# Patient Record
Sex: MALE | Race: WHITE
[De-identification: names, ages, dates, MRNs, and addresses within clinical notes are randomized per-mention and may not be internally consistent; named-entity substitution may affect disease eponyms.]

---

## 2017-12-08 ENCOUNTER — HOSPITAL ENCOUNTER (EMERGENCY)
Dept: HOSPITAL 25 - UCEAST | Age: 82
Discharge: HOME | End: 2017-12-08
Payer: MEDICARE

## 2017-12-08 VITALS — SYSTOLIC BLOOD PRESSURE: 131 MMHG | DIASTOLIC BLOOD PRESSURE: 75 MMHG

## 2017-12-08 DIAGNOSIS — I10: ICD-10-CM

## 2017-12-08 DIAGNOSIS — Z79.82: ICD-10-CM

## 2017-12-08 DIAGNOSIS — R33.9: Primary | ICD-10-CM

## 2017-12-08 PROCEDURE — 51702 INSERT TEMP BLADDER CATH: CPT

## 2017-12-08 PROCEDURE — G0463 HOSPITAL OUTPT CLINIC VISIT: HCPCS

## 2017-12-08 PROCEDURE — 81003 URINALYSIS AUTO W/O SCOPE: CPT

## 2017-12-08 PROCEDURE — 87086 URINE CULTURE/COLONY COUNT: CPT

## 2017-12-08 PROCEDURE — 99201: CPT

## 2017-12-08 NOTE — UC
Complaint Male HPI





- HPI Summary


HPI Summary: 


problems with urinary retention for about 2 weeks. Today has pain in lower 

abdomen and cannot void








- History of Current Complaint


Chief Complaint: UCGU


Stated Complaint: FREQUENT URINATION


Time Seen by Provider: 12/08/17 16:54


Hx Obtained From: Patient


Onset/Duration: Gradual Onset, Lasting Weeks - 2, Still Present


Timing: Constant


Severity Initially: Mild


Severity Currently: Severe


Pain Intensity: 8


Pain Scale Used: 0-10 Numeric


Location: Suprapubic


Character: Constant Pressure


Aggravating Factor(s): Palpation


Alleviating Factor(s): Nothing


Associated Signs And Symptoms: Negative: Back Pain, Fever, Hematuria, Dysuria, 

Rectal Pain, Nausea, Vomiting(# Of Episodes =), Penile Swelling





- Allergies/Home Medications


Allergies/Adverse Reactions: 


 Allergies











Allergy/AdvReac Type Severity Reaction Status Date / Time


 


Unable to Obtain Allergy   Verified 12/08/17 15:51











Home Medications: 


 Home Medications





Aspirin TAB* [Aspirin 325 MG TAB*] 325 mg PO DAILY 12/08/17 [History Confirmed 

12/08/17]


glyBURIDE TAB* [Diabeta TAB*] 5 mg PO 0800,1700 12/08/17 [History Confirmed 12/ 08/17]











PMH/Surg Hx/FS Hx/Imm Hx


Previously Healthy: No


Cardiovascular History: Hypertension





- Surgical History


Surgical History: None





- Family History


Known Family History: Positive: None





- Social History


Occupation: Retired


Lives: Alone


Alcohol Use: None


Substance Use Type: None


Smoking Status (MU): Never Smoked Tobacco





- Immunization History


Most Recent Influenza Vaccination: no


Most Recent Pneumonia Vaccination: no, unsure





Review of Systems


Constitutional: Negative


Skin: Negative


Eyes: Negative


ENT: Negative


Respiratory: Negative


Cardiovascular: Negative


Gastrointestinal: Negative


Genitourinary: Negative - to void, Other


Motor: Negative


Neurovascular: Negative


Musculoskeletal: Negative


Neurological: Negative


Psychological: Negative


Is Patient Immunocompromised?: No


All Other Systems Reviewed And Are Negative: Yes





Physical Exam


Triage Information Reviewed: Yes


Appearance: Well-Appearing, No Pain Distress, Well-Nourished


Vital Signs: 


 Initial Vital Signs











Temp  97.6 F   12/08/17 15:51


 


Pulse  81   12/08/17 15:51


 


Resp  20   12/08/17 15:51


 


BP  142/77   12/08/17 15:51


 


Pulse Ox  98   12/08/17 15:51











Vital Signs Reviewed: Yes


Eye Exam: Normal


Eyes: Positive: Conjunctiva Clear


ENT Exam: Normal


ENT: Positive: Normal ENT inspection, Hearing grossly normal, Pharynx normal.  

Negative: Nasal congestion, Trismus, Muffled voice, Hoarse voice, Dental 

tenderness


Dental Exam: Normal


Neck exam: Normal


Neck: Negative: Supple, Nontender


Respiratory Exam: Normal


Respiratory: Positive: Chest non-tender, Lungs clear, Normal breath sounds, No 

respiratory distress, No accessory muscle use


Cardiovascular Exam: Normal


Cardiovascular: Positive: RRR, No Murmur, Pulses Normal, Brisk Capillary Refill


Abdominal Exam: Normal


Abdomen Description: Positive: Distended, Other: - Bladder palpated to 1 fb 

below umbilicus.  Negative: CVA Tenderness (R), CVA Tenderness (L)


Bowel Sounds: Positive: Present


Musculoskeletal Exam: Normal


Musculoskeletal: Positive: Strength Intact, ROM Intact, No Edema


Neurological Exam: Normal


Neurological: Positive: Alert, Muscle Tone Normal


Psychological Exam: Normal


Skin Exam: Normal





Re-Evaluation





- Re-Evaluation


  ** First Eval


Change: Improved - Sandoval cath placed by nursing 1400cc of urine returned clear 

yellow-patient feels better





 Complaint Male Course/Dx





- Course


Course Of Treatment: Leave sandoval in , follow with urology on Monday return for 

any problems or concerns





- Differential Dx/Diagnosis


Provider Diagnoses: Urinary retention





Discharge





- Discharge Plan


Condition: Stable


Disposition: HOME


Patient Education Materials:  Urinary Retention in Men (ED), Sandoval Catheter 

Placement and Care (ED)


Referrals: 


[facility] [Provider Group] - 12/11/17

## 2017-12-14 ENCOUNTER — HOSPITAL ENCOUNTER (EMERGENCY)
Dept: HOSPITAL 25 - UCEAST | Age: 82
Discharge: HOME | End: 2017-12-14
Payer: MEDICARE

## 2017-12-14 VITALS — DIASTOLIC BLOOD PRESSURE: 67 MMHG | SYSTOLIC BLOOD PRESSURE: 149 MMHG

## 2017-12-14 DIAGNOSIS — K59.00: Primary | ICD-10-CM

## 2017-12-14 PROCEDURE — G0463 HOSPITAL OUTPT CLINIC VISIT: HCPCS

## 2017-12-14 PROCEDURE — 99213 OFFICE O/P EST LOW 20 MIN: CPT

## 2017-12-14 RX ADMIN — SODIUM PHOSPHATE ONE BOTTLE: 7; 19 ENEMA RECTAL at 18:48

## 2017-12-14 NOTE — UC
Rectal Pain HPI





- HPI Summary


HPI Summary: 





84 year old male with history of DM, s/p sandoval placement for urinary retention 

here for difficulty with BM for 3 days. Last nml BM 3 days ago, and small bm 

this morning. He reports he has the urgency but no success. No abdominal pain, 

nausea or vomiting. 


No recent change in medications or use of opiates. 





- History Of Current Complaint


Chief Complaint: UCGeneralIllness


Stated Complaint: RECTAL DISCOMFORT


Time Seen by Provider: 12/14/17 17:53


Onset/Duration: Gradual Onset


Timing: Constant


Severity Initially: Mild


Severity Currently: Moderate


Location Of Pain: Rectal


Associated Signs And Symptoms: Positive: Constipation.  Negative: Rectal 

Bleeding, Blood-Streaked Stool, Black Tarry Stool, External Hemorrhoid, Diarrhea





- Allergies/Home Medications


Allergies/Adverse Reactions: 


 Allergies











Allergy/AdvReac Type Severity Reaction Status Date / Time


 


antibiotic Allergy  Rash Uncoded 12/14/17 17:47














PMH/Surg Hx/FS Hx/Imm Hx





- Surgical History


Surgical History: None





- Family History


Known Family History: Positive: None





- Social History


Alcohol Use: None


Substance Use Type: None


Smoking Status (MU): Never Smoked Tobacco





- Immunization History


Most Recent Influenza Vaccination: no


Most Recent Pneumonia Vaccination: no, unsure





Review of Systems


Constitutional: Negative


Skin: Negative


Eyes: Negative


ENT: Negative


Respiratory: Negative


Cardiovascular: Negative


Gastrointestinal: Negative


Genitourinary: Negative


Motor: Negative


Neurovascular: Negative


Musculoskeletal: Negative


Neurological: Negative


Psychological: Negative


All Other Systems Reviewed And Are Negative: Yes





Physical Exam


Triage Information Reviewed: Yes


Appearance: Well-Appearing, No Pain Distress


Vital Signs: 


 Initial Vital Signs











Temp  36.6 C   12/14/17 17:47


 


Pulse  85   12/14/17 17:47


 


Resp  16   12/14/17 17:47


 


BP  149/67   12/14/17 17:47


 


Pulse Ox  100   12/14/17 17:47











Eye Exam: Normal


Dental Exam: Normal


Neck exam: Normal


Respiratory: Positive: Chest non-tender, Lungs clear, Normal breath sounds, No 

respiratory distress


Cardiovascular: Positive: RRR, No Murmur


Abdomen Description: Positive: Nontender, No Organomegaly, Soft


Bowel Sounds: Positive: Present, Other: - No external hemorrhoids No hard 

stools No rectal fluctuance


Skin Exam: Normal





Rectal Pain Course/Dx





- Course


Course Of Treatment: Constipation.  patient received fleet enema and had a 

large bowel movement.





- Differential Dx/Diagnosis


Differential Diagnosis/HQI/PQRI: Hemorrhoid(s), Perirectal Abscess, Pruritis Ani


Provider Diagnoses: Constipation





Discharge





- Discharge Plan


Condition: Good


Disposition: HOME


Prescriptions: 


Docusate CAP* [Colace Cap*] 100 mg PO BID PRN #20 cap


 PRN Reason: Constipation


Patient Education Materials:  Constipation (ED), High Fiber Diet (ED), Fleet 

Enema (ED)


Referrals: 


No Primary Care Phys,NOPCP [Primary Care Provider] -

## 2017-12-18 ENCOUNTER — HOSPITAL ENCOUNTER (EMERGENCY)
Dept: HOSPITAL 25 - UCEAST | Age: 82
Discharge: HOME | End: 2017-12-18
Payer: MEDICARE

## 2017-12-18 VITALS — SYSTOLIC BLOOD PRESSURE: 145 MMHG | DIASTOLIC BLOOD PRESSURE: 72 MMHG

## 2017-12-18 DIAGNOSIS — R31.9: Primary | ICD-10-CM

## 2017-12-18 DIAGNOSIS — R03.0: ICD-10-CM

## 2017-12-18 PROCEDURE — 87086 URINE CULTURE/COLONY COUNT: CPT

## 2017-12-18 PROCEDURE — 99212 OFFICE O/P EST SF 10 MIN: CPT

## 2017-12-18 PROCEDURE — G0463 HOSPITAL OUTPT CLINIC VISIT: HCPCS

## 2017-12-18 PROCEDURE — 81003 URINALYSIS AUTO W/O SCOPE: CPT

## 2017-12-18 NOTE — UC
Maria Antonia DUCKWORTH Nilda, scribed for Amauri Moraes MD on 12/18/17 at 1745 .





 Complaint Male HPI





- HPI Summary


HPI Summary: 


This patient is an 84 year old M [place] [facility] [place] chief complaint of 

blood in catheter bag since yesterday. Pt states urinary catheter was placed 12/ 8[place]17 [place] [facility] [place].  Symptoms aggravated by sitting and 

alleviated by standing. Patient reports constipation and that he "feels strange 

down there." Per triage note, the patient rates the pressure 3/10 in severity. 

He states his clothes seem tight and is unsure if this is affecting his 

catheter. Patient denies malaise, pain, fever, chills, and sore throat. Pt 

states he has an appointment with urologist tomorrow. 





- History of Current Complaint


Chief Complaint: UCGU


Stated Complaint: BLOOD IN URINE


Time Seen by Provider: 12/18/17 17:30


Hx Obtained From: Patient, Medical Records - triage note


Onset/Duration: Sudden Onset, Lasting Days - yesterday, Still Present


Timing: Lasting Days


Severity Currently: Mild


Pain Intensity: 3


Pain Scale Used: 0-10 Numeric


Location: Penis


Character: Constant Pressure


Aggravating Factor(s): Other - sitting


Alleviating Factor(s): Other - standing


Associated Signs And Symptoms: Positive: Hematuria, Constipation.  Negative: 

Fever





- Allergies/Home Medications


Allergies/Adverse Reactions: 


 Allergies











Allergy/AdvReac Type Severity Reaction Status Date / Time


 


antibiotic Allergy  Rash Uncoded 12/14/17 17:47














PMH/Surg Hx/FS Hx/Imm Hx


Previously Healthy: No


Endocrine History: Diabetes


Cardiovascular History: Hypertension





- Surgical History


Surgical History: None





- Family History


Known Family History: Positive: Diabetes





- Social History


Occupation: Retired


Lives: Alone


Alcohol Use: None


Substance Use Type: None


Smoking Status (MU): Never Smoked Tobacco





- Immunization History


Most Recent Influenza Vaccination: no


Most Recent Pneumonia Vaccination: no, unsure





Review of Systems


Constitutional: Other - negative fever, chills, malaise


ENT: Other - negative sore throat


Gastrointestinal: Other - constipation


Genitourinary: Hematuria, Other - pressure; negative pain.


All Other Systems Reviewed And Are Negative: Yes





Physical Exam


Triage Information Reviewed: Yes


Vital Signs: 


 Initial Vital Signs











Temp  97.8 F   12/18/17 16:52


 


Pulse  77   12/18/17 16:52


 


Resp  18   12/18/17 16:52


 


BP  140/76   12/18/17 16:52


 


Pulse Ox  98   12/18/17 16:52











Vital Signs Reviewed: Yes





- Additional Comments


General: well-appearing, no pain distress


Skin: warm, color reflects adequate perfusion, dry


Head: normal   


Eyes: EOMI, BRE


ENT: normal


Neck: supple, nontender


Respiratory: CTA, breath sounds present


Cardiovascular: RRR


Abdomen: soft, nontender


Bowel: present


Urine: Red tinged urine in bag


Musculoskeletal: normal, strength/ROM intact


Neurological: normal, sensory/motor intact, A&O x3


Psychological: affect/mood appropriate





Re-Evaluation





- Re-Evaluation


  ** First Eval


Re-Evaluation Time: 18:42


Comment: Pt produced good urine.





 Complaint Male Course/Dx





- Course


Course Of Treatment: Allergies noted.  Medications reviewed.  BP noted and 

advised to follow up with PCP.  WILL GIVE BACTRIM DS, 1 HERE IN THE CLINIC AND 

ONE TO GO HOME FOR THE AM. HE WILL SEE DR HACKETT TOMORROW. DR HACKETT CAN 

DETERMINE IF MR DEGROOT NEEDEDS TO CONTINUE THE ANTIBIOTIC.  GO TO ED IF WORSE 

TONIGHT.





- Differential Dx/Diagnosis


Provider Diagnoses: HEMATURIA.  Elevated blood pressure without history of 

hypertension.





Discharge





- Discharge Plan


Condition: Stable


Disposition: HOME


Prescriptions: 


Sulfamethox/Trimethoprim DS* [Bactrim /160 TAB*] 1 tab PO BID #18 tab


Patient Education Materials:  Hematuria (ED)


Referrals: 


[facility] [Provider Group]


No Primary Care Phys,NOPCP [Primary Care Provider] - 


Raúl Hackett MD [Medical Doctor] - 


Additional Instructions: 


FOLLOW UP WITH UROLOGY, DR HACKETT, TOMORROW, 12/19/17, AS SCHEDULED.


TAKE THE ANTIBIOTIC BACTRIM IN THE MORNING. DISCUSS WITH DR HACKETT WHETHER TO 

CONTINUE THE ANTIBIOTIC.


GO TO THE EMERGENCY DEPARTMENT FOR ANY WORSENING OF YOUR CONDITION OR QUESTIONS 

OR CONCERNS.


Your blood pressure was elevated during todays visit; please follow up with 

your primary care provider within a week for further evaluation.





The documentation as recorded by the Maria Antonia emanuel Nilda accurately 

reflects the service I personally performed and the decisions made by me, 

Amauri Moraes MD.

## 2017-12-23 ENCOUNTER — HOSPITAL ENCOUNTER (EMERGENCY)
Dept: HOSPITAL 25 - UCEAST | Age: 82
Discharge: HOME | End: 2017-12-23
Payer: MEDICARE

## 2017-12-23 VITALS — SYSTOLIC BLOOD PRESSURE: 146 MMHG | DIASTOLIC BLOOD PRESSURE: 80 MMHG

## 2017-12-23 DIAGNOSIS — E11.9: ICD-10-CM

## 2017-12-23 DIAGNOSIS — Z46.6: Primary | ICD-10-CM

## 2017-12-23 DIAGNOSIS — Z88.1: ICD-10-CM

## 2017-12-23 PROCEDURE — G0463 HOSPITAL OUTPT CLINIC VISIT: HCPCS

## 2017-12-23 PROCEDURE — 99212 OFFICE O/P EST SF 10 MIN: CPT

## 2017-12-23 NOTE — UC
Complaint Male HPI





- HPI Summary


HPI Summary: 





PT HAS INDWELLING TEE FOR PAST WEEK OR SO FOLLOWED BY DR. BACON. PT 

UNCLEAR AS TO WHY HE HAS THIS BUT IS ON BACTRIM BID AND HAS FOLLOW-UP WITH DR. BACON ON DECEMBER 28. PT HERE REPORTING THAT THE LEG BAG IS LEAKING. HE 

THINKS IT GOT CAUGHT ON SOMETHING. IS REQUESTING A NEW BAG. PT DENIES FEVER, 

ABDOMINAL PAIN. URINE IS CLEAR AND NOT FOUL SMELLING.





- History of Current Complaint


Chief Complaint: UCGU


Stated Complaint: URINARY


Time Seen by Provider: 12/23/17 11:25


Hx Obtained From: Patient


Timing: Constant


Pain Intensity: 0


Pain Scale Used: 0-10 Numeric


Associated Signs And Symptoms: Positive: Negative





- Allergies/Home Medications


Allergies/Adverse Reactions: 


 Allergies











Allergy/AdvReac Type Severity Reaction Status Date / Time


 


antibiotic Allergy  Rash Uncoded 12/23/17 09:40














PMH/Surg Hx/FS Hx/Imm Hx


Endocrine History: Diabetes





- Surgical History


Surgical History: None





- Family History


Known Family History: Positive: Diabetes


   Negative: Hypertension





- Social History


Alcohol Use: None


Substance Use Type: None


Smoking Status (MU): Never Smoked Tobacco





- Immunization History


Most Recent Influenza Vaccination: no


Most Recent Pneumonia Vaccination: no, unsure





Review of Systems


Constitutional: Negative


Respiratory: Negative


Cardiovascular: Negative


Gastrointestinal: Negative


Genitourinary: Other - TEE BAG LEAKING


All Other Systems Reviewed And Are Negative: Yes





Physical Exam


Triage Information Reviewed: Yes


Appearance: Well-Appearing, No Pain Distress, Well-Nourished


Vital Signs: 


 Initial Vital Signs











Temp  97.7 F   12/23/17 09:36


 


Pulse  89   12/23/17 09:36


 


Resp  16   12/23/17 09:36


 


BP  149/74   12/23/17 09:36


 


Pulse Ox  99   12/23/17 09:36











Vital Signs Reviewed: Yes


Eyes: Positive: Conjunctiva Clear


ENT: Positive: Hearing grossly normal


Neck: Positive: Supple


Respiratory: Positive: No respiratory distress, No accessory muscle use


Cardiovascular: Positive: Pulses Normal


Abdomen Description: Positive: Nontender, Soft.  Negative: CVA Tenderness (R), 

CVA Tenderness (L)


Musculoskeletal: Positive: No Edema


Neurological: Positive: Alert


Psychological: Positive: Age Appropriate Behavior


Skin: Negative: rashes





 Complaint Male Course/Dx





- Course


Course Of Treatment: TEE BAG REPLACED WITHOUT INCIDENT





- Differential Dx/Diagnosis


Provider Diagnoses: TEE BAG REPLACEMENT





Discharge





- Discharge Plan


Condition: Stable


Disposition: HOME


Referrals: 


Lavon Ibrahim MD [Primary Care Provider] - If Needed


Raúl Bacon MD [Medical Doctor] -  (KEEP YOUR APPT ON DECEMBER 28)


Additional Instructions: 


YOUR TEE BAG WAS SLIGHTLY TORN. WE HAVE REPLACED IT WITH A NEW ONE. KEEP YOUR 

FOLLOW-UP APPOINTMENT WITH DR. BACON ON DECEMBER 28 AS SCHEDULED. SEEK FOLLOW

-UP HERE OR IN THE ER IF YOU ENCOUNTER ANY OTHER PROBLEMS IN THE MEANTIME.

## 2018-06-21 ENCOUNTER — HOSPITAL ENCOUNTER (INPATIENT)
Dept: HOSPITAL 25 - ED | Age: 83
LOS: 5 days | Discharge: NURSING, REHAB OR LONG-TERM CARE | DRG: 65 | End: 2018-06-26
Attending: HOSPITALIST | Admitting: INTERNAL MEDICINE
Payer: MEDICARE

## 2018-06-21 DIAGNOSIS — G81.94: ICD-10-CM

## 2018-06-21 DIAGNOSIS — Z79.84: ICD-10-CM

## 2018-06-21 DIAGNOSIS — I12.9: ICD-10-CM

## 2018-06-21 DIAGNOSIS — E44.1: ICD-10-CM

## 2018-06-21 DIAGNOSIS — I63.29: Primary | ICD-10-CM

## 2018-06-21 DIAGNOSIS — N39.0: ICD-10-CM

## 2018-06-21 DIAGNOSIS — Z87.891: ICD-10-CM

## 2018-06-21 DIAGNOSIS — N13.9: ICD-10-CM

## 2018-06-21 DIAGNOSIS — Z79.82: ICD-10-CM

## 2018-06-21 DIAGNOSIS — E11.22: ICD-10-CM

## 2018-06-21 DIAGNOSIS — C79.82: ICD-10-CM

## 2018-06-21 DIAGNOSIS — Z79.899: ICD-10-CM

## 2018-06-21 DIAGNOSIS — N18.3: ICD-10-CM

## 2018-06-21 DIAGNOSIS — B96.89: ICD-10-CM

## 2018-06-21 LAB
BASOPHILS # BLD AUTO: 0 10^3/UL (ref 0–0.2)
EOSINOPHIL # BLD AUTO: 0.3 10^3/UL (ref 0–0.6)
HCT VFR BLD AUTO: 38 % (ref 42–52)
HGB BLD-MCNC: 12.9 G/DL (ref 14–18)
INR PPP/BLD: 1.06 (ref 0.77–1.02)
LYMPHOCYTES # BLD AUTO: 0.9 10^3/UL (ref 1–4.8)
MCH RBC QN AUTO: 30 PG (ref 27–31)
MCHC RBC AUTO-ENTMCNC: 34 G/DL (ref 31–36)
MCV RBC AUTO: 87 FL (ref 80–94)
MONOCYTES # BLD AUTO: 0.9 10^3/UL (ref 0–0.8)
NEUTROPHILS # BLD AUTO: 5.1 10^3/UL (ref 1.5–7.7)
NRBC # BLD AUTO: 0 10^3/UL
NRBC BLD QL AUTO: 0
PLATELET # BLD AUTO: 242 10^3/UL (ref 150–450)
RBC # BLD AUTO: 4.33 10^6/UL (ref 4–5.4)
WBC # BLD AUTO: 7.2 10^3/UL (ref 3.5–10.8)

## 2018-06-21 PROCEDURE — 93880 EXTRACRANIAL BILAT STUDY: CPT

## 2018-06-21 PROCEDURE — 84484 ASSAY OF TROPONIN QUANT: CPT

## 2018-06-21 PROCEDURE — 99284 EMERGENCY DEPT VISIT MOD MDM: CPT

## 2018-06-21 PROCEDURE — 80061 LIPID PANEL: CPT

## 2018-06-21 PROCEDURE — 36415 COLL VENOUS BLD VENIPUNCTURE: CPT

## 2018-06-21 PROCEDURE — 87086 URINE CULTURE/COLONY COUNT: CPT

## 2018-06-21 PROCEDURE — 81015 MICROSCOPIC EXAM OF URINE: CPT

## 2018-06-21 PROCEDURE — 83036 HEMOGLOBIN GLYCOSYLATED A1C: CPT

## 2018-06-21 PROCEDURE — 70551 MRI BRAIN STEM W/O DYE: CPT

## 2018-06-21 PROCEDURE — 85610 PROTHROMBIN TIME: CPT

## 2018-06-21 PROCEDURE — 81003 URINALYSIS AUTO W/O SCOPE: CPT

## 2018-06-21 PROCEDURE — 80048 BASIC METABOLIC PNL TOTAL CA: CPT

## 2018-06-21 PROCEDURE — 82947 ASSAY GLUCOSE BLOOD QUANT: CPT

## 2018-06-21 PROCEDURE — 85025 COMPLETE CBC W/AUTO DIFF WBC: CPT

## 2018-06-21 PROCEDURE — 93005 ELECTROCARDIOGRAM TRACING: CPT

## 2018-06-21 PROCEDURE — 87077 CULTURE AEROBIC IDENTIFY: CPT

## 2018-06-21 PROCEDURE — 70450 CT HEAD/BRAIN W/O DYE: CPT

## 2018-06-21 PROCEDURE — 80053 COMPREHEN METABOLIC PANEL: CPT

## 2018-06-21 PROCEDURE — 83605 ASSAY OF LACTIC ACID: CPT

## 2018-06-21 PROCEDURE — 87186 SC STD MICRODIL/AGAR DIL: CPT

## 2018-06-22 RX ADMIN — SODIUM CHLORIDE SCH MLS/HR: 900 IRRIGANT IRRIGATION at 22:55

## 2018-06-22 RX ADMIN — HEPARIN SODIUM SCH UNITS: 5000 INJECTION INTRAVENOUS; SUBCUTANEOUS at 14:48

## 2018-06-22 RX ADMIN — INSULIN LISPRO SCH UNITS: 100 INJECTION, SOLUTION INTRAVENOUS; SUBCUTANEOUS at 21:20

## 2018-06-22 RX ADMIN — INSULIN LISPRO SCH UNITS: 100 INJECTION, SOLUTION INTRAVENOUS; SUBCUTANEOUS at 08:22

## 2018-06-22 RX ADMIN — CLOPIDOGREL SCH MG: 75 TABLET, FILM COATED ORAL at 07:47

## 2018-06-22 RX ADMIN — ASPIRIN 325 MG ORAL TABLET SCH MG: 325 PILL ORAL at 07:47

## 2018-06-22 RX ADMIN — INSULIN LISPRO SCH UNITS: 100 INJECTION, SOLUTION INTRAVENOUS; SUBCUTANEOUS at 12:10

## 2018-06-22 RX ADMIN — CLOPIDOGREL SCH: 75 TABLET, FILM COATED ORAL at 03:29

## 2018-06-22 RX ADMIN — HEPARIN SODIUM SCH UNITS: 5000 INJECTION INTRAVENOUS; SUBCUTANEOUS at 05:09

## 2018-06-22 RX ADMIN — INSULIN LISPRO SCH UNITS: 100 INJECTION, SOLUTION INTRAVENOUS; SUBCUTANEOUS at 17:22

## 2018-06-22 RX ADMIN — ATORVASTATIN CALCIUM SCH MG: 20 TABLET, FILM COATED ORAL at 21:20

## 2018-06-22 RX ADMIN — SODIUM CHLORIDE SCH MLS/HR: 900 IRRIGANT IRRIGATION at 05:05

## 2018-06-22 RX ADMIN — HEPARIN SODIUM SCH UNITS: 5000 INJECTION INTRAVENOUS; SUBCUTANEOUS at 21:20

## 2018-06-22 NOTE — RAD
HISTORY: weakness



COMPARISONS: None



TECHNIQUE: Multiple contiguous axial CT scans were obtained of the head without 

intravenous contrast. 



FINDINGS: 

HEMORRHAGE/INFARCT: There is no hemorrhage or acute infarct.

MASSES/SHIFT: There is no mass or shift.



EXTRA-AXIAL SPACES: There are no extra-axial fluid collections.

SULCI AND VENTRICLES: The sulci and ventricles are normal in size and position for the

patient's stated age.



CEREBRUM: There are no focal parenchymal abnormalities.

BRAINSTEM: There are no focal parenchymal abnormalities.

CEREBELLUM: There are no focal parenchymal abnormalities.



VESSELS: There is calcification of the cavernous segments of the internal carotid arteries

bilaterally and of the distal vertebral arteries bilaterally.

PARANASAL SINUSES: The paranasal sinuses are clear.

ORBITS: The orbits are unremarkable.

BONES AND SOFT TISSUE: No bone or soft tissue abnormalities are noted.



OTHER: None



IMPRESSION: 

NO ACUTE INTRACRANIAL PATHOLOGY.

## 2018-06-22 NOTE — RAD
HISTORY: r/o stenosis, weakness, no other history is provided



COMPARISONS: None



TECHNIQUE: Multiple transverse and longitudinal ultrasound images were obtained of the

carotid and vertebral arteries bilaterally, using grayscale, color Doppler, and spectral

Doppler imaging.



FINDINGS:



Measurement of carotid stenosis is based on flow velocity parameters that correlate the

residual internal carotid artery diameter with [employer] Symptomatic Carotid

Endarterectomy Trial (NASCET)-based stenosis levels.



RIGHT:

Intima: There is diffuse intimal thickening with more focal atheroma formation at the

bifurcation.



Velocities:

Right internal carotid artery maximum peak systolic velocity: 179 cm/s

Right common carotid artery maximum peak systolic velocity: 120 cm/s

Right internal carotid artery/common carotid artery ratio: 1.8



Waveforms: There is spectral broadening of the right ICA.



Right Vertebral: The right vertebral artery flow is antegrade.



LEFT:

Intima: There is diffuse intimal thickening with more focal atheroma formation at the

bifurcation.



Velocities:

Left internal carotid artery maximum peak systolic velocity: 88 cm/s

Left common carotid artery maximum peak systolic velocity: 114 cm/s

Left internal carotid artery/common carotid artery ratio: 1



Waveforms: There is no spectral broadening.



Left Vertebral: The left vertebral artery flow is antegrade.



OTHER FINDINGS: None.



IMPRESSION: 

1.  ELEVATED PEAK SYSTOLIC VELOCITIES OF THE RIGHT INTERNAL CAROTID ARTERY, CONSISTENT

WITH RIGHT INTERNAL CAROTID ARTERY STENOSIS BETWEEN 50% AND 69% BY NASCET CRITERIA..

2.  NO HEMODYNAMICALLY SIGNIFICANT STENOSIS OF THE LEFT INTERNAL CAROTID ARTERY BY FLOW

VELOCITY MEASUREMENTS. THIS CORRESPONDS TO A LUMINAL DIAMETER OF LESS THAN 50% STENOSIS BY

NASCET CRITERIA.



CPT II Codes: 3100F

## 2018-06-22 NOTE — RAD
HISTORY: r/o CVA, unsteady gait, falls



COMPARISONS: June 21, 2018



TECHNIQUE: The following sequences were obtained of the head: Sagittal T1-weighted images,

axial T2-weighted images, axial FLAIR images, axial susceptibility weighted images, axial

T1-weighted images. Additionally, axial diffusion-weighted images were obtained with

calculated apparent diffusion coefficients.



FINDINGS: 

HEMORRHAGE/INFARCT: There is restricted diffusion within the right parasagittal beth at

the pontomedullary junction consistent with subacute nonhemorrhagic infarct. Elsewhere,

there is no hemorrhage or acute infarct.

MASSES/SHIFT: There is no mass or shift.

EXTRA-AXIAL SPACES/MENINGES: There are no extra-axial fluid collections.

SULCI AND VENTRICLES: There is diffuse and proportional enlargement of the sulci and

ventricles.



CEREBRUM: There is diffuse multifocal elevated T2/FLAIR signal within the periventricular

and subcortical white matter. 

BRAINSTEM: There is elevated T2/STIR signal within the right parasagittal beth at the

pontomedullary junction corresponding to the area of restricted diffusion.

CEREBELLUM: There are no focal parenchymal abnormalities. The cerebellar tonsils are

normal in size and position.



SELLA: The sella is normal.

PINEAL: The pineal region is clear.

CP ANGLE/TEMPORAL BONES: The labyrinthine structures are grossly normal.



VESSELS: Normal flow-voids are noted within the visualized vertebral vasculature.

DIFFUSION ABNORMALITIES: As noted above, there is restricted diffusion within the right

parasagittal beth measuring 1.1 x 0.6 cm transversely. 



PARANASAL SINUSES/MASTOIDS: There is minimal mucosal thickening of ethmoid air cells with

a small left mastoid effusion.

ORBITS: The orbits are unremarkable.

BONES AND SOFT TISSUE: No bone or soft tissue abnormalities are noted.



OTHER: None



IMPRESSION: 

1.  SUBACUTE NONHEMORRHAGIC INFARCT OF THE RIGHT PARASAGITTAL BETH SUGGESTIVE OF A PONTINE

 STROKE.

2.  DIFFUSE INVOLUTIONAL CHANGE WITH CHRONIC SMALL VESSEL ISCHEMIC CHANGES.

3.  SMALL LEFT MASTOID EFFUSION. MILD SINUS MUCOSAL INFLAMMATORY DISEASE, WITHOUT

AIR-FLUID LEVEL TO SUGGEST ACUTE SINUSITIS.

## 2018-06-22 NOTE — HP
HISTORY AND PHYSICAL:

 

DATE OF ADMISSION:  06/22/18.

 

ADDENDUM:

1.  In regard to the treatment of the patient's likely subacute stroke, the 
patient is going to be added on Plavix to aspirin that he is already taking.

2.  Due to the chronic kidney insufficiency, CT angiogram of the head and neck 
is not going to be obtained, but I will obtain carotid Dopplers.

3.  The patient has a chronic indwelling Marshall catheter in place and his Marshall 
is going to be exchanged due to markedly abnormal urinalysis and likely Marshall- 
associated UTI.

 

 

 

[id number]/[facility] #: [id number]

MTDD

## 2018-06-22 NOTE — HP
*** ADDENDUM NOW INCLUDED ON THIS REPORT ***



CC:  Dr. Bacon; Dr. Ibrahim.*

 

HISTORY AND PHYSICAL:

 

DATE OF ADMISSION:  06/22/18.

 

PRIMARY CARE PROVIDER:  Dr. Ibrahim.

 

CHIEF COMPLAINT:  Unsteady gait, falls, and confusion.

 

HISTORY OF PRESENT ILLNESS:  Samuel Garcia is an 85-year-old male with history of 
metastatic prostate cancer with indwelling Marshall in place, who presented to the 
hospital with complaints of unsteady gait and falls for the past several weeks. 
The patient stated that he lost approximately 40 pounds in the past eight weeks
, although he is a very poor historian.  The son that brought the patient here 
stated that the patient had been disoriented occasionally and falling.  
Unfortunately, the son left and I was unable to contact him.  At this point, 
the patient appears to have left-sided weakness and is going to be admitted 
with a diagnosis of left-sided weakness likely subacute CVA.

 

PAST MEDICAL HISTORY:

1.  History of metastatic prostate cancer with indwelling Marshall catheter in 
place under the care of Dr. Bacon.

2.  History of diabetes.

3.  Hypertension.

4.  Chronic kidney disease, stage 3.

 

MEDICATIONS:

1.  Aspirin 325 mg daily.

2.  Glyburide 5 mg b.i.d.

3.  Colace 100 mg b.i.d. p.r.n.

4.  Lupron as per Dr. Bacon's orders.

 

ALLERGIES:  No known drug allergies.

 

FAMILY HISTORY:  The patient stated that he is not aware of medical history of 
his parents.

 

SOCIAL HISTORY:  The patient stated that he lives alone.  He is a retired 
. He quit smoking over 40 years ago.  He denies any alcohol or drug 
use.  He stated that his daughter decided to change her gender, and now her 
name is Lawson Tatum and his now son is Lawson is his surrogate.

 

The patient is a full code.

 

REVIEW OF SYSTEMS:  Please see history of present illness.  All other remaining 
12 systems were reviewed with the patient, who is a very poor historian and 
were otherwise negative.

 

                               PHYSICAL EXAMINATION

 

GENERAL:  The patient is a very pleasant 85-year-old male, who is in no acute 
distress.  The patient is alert and oriented x2.  He knows the year, but he is 
not aware of the month.  He is a very poor historian.

 

VITAL SIGNS:  Blood pressure of 134/79, heart rate of 77 and regular, 
respiratory rate 19, oxygen saturation 94% on room air, temperature of 97.6.

 

HEENT:  Head atraumatic and normocephalic.  Eyes:  Pupils are equal and 
reactive to light and accommodation.  Oropharynx clear.  Mucosa moist.

 

NECK:  Supple.  No JVD.  No bruits bilaterally.

 

RESPIRATORY:  Clear to auscultation bilaterally.

 

CARDIOVASCULAR:  Regular rate and rhythm.  No murmurs.

 

ABDOMEN:  Soft, nontender.  Bowel sounds present in all 4 quadrants.

 

EXTREMITIES:  There is no edema.  Pulses +2 bilaterally.  No clubbing or 
cyanosis.

 

NEUROLOGIC:  On neuro evaluation, speech clear.  Cranial nerves II through XII 
grossly intact.  Motor strength is 4+/5 in the left hand .  There is no 
pronator drift.  Right side is 5/5 in upper and lower extremity.  The left leg, 
the patient is able to raise off the bed for approximately 1 second by 
approximately 10 to 20 degrees before it falls back on the bed.

 

PSYCHIATRIC EVALUATION:  The patient appears to have a very flat affect.  He is 
a poor historian.

 

 DIAGNOSTIC STUDIES/LAB DATA:  White blood cell count of 7.2, hemoglobin of 12.9
, hematocrit of 38, and platelets of 242.  Sodium of 133, potassium 3.9, 
chloride 97, carbon dioxide 27, BUN 23, creatinine 1.4.  Liver function tests 
are unremarkable.  Glucose low at 274.  Lactic acid of 2.3.

 

Urinalysis grossly positive for UTI.

 

Brain CT was unremarkable and read by the [facility] radiologist.

 

The patient's EKG shows sinus rhythm with PACs, low voltage EKG, heart rate of 
79 beats per minute.

 

ASSESSMENT AND PLAN:

1.  Disorientation, falls, and left-sided weakness.  The patient likely has 
subacute stroke.  He is going to be admitted to the hospital on telemetry 
monitor bed.  Neuro checks are going to be continued and MRI of the brain is 
going to be obtained in the morning.  He will likely require a Neurology 
consult in the morning.

2.  In regards to the patient's likely urinary tract infection, Marshall catheter 
is going to be exchanged since the patient has indwelling Marshall catheter due to 
his history of prostate cancer.  The patient is going to be started on 
ceftriaxone.

3.  For diabetes, the patient is going to be continued on insulin sliding scale.

4.  Chronic kidney disease, stage 3 with creatinine at the baseline today.

5.  The patient's code status is full.  His surrogate is his son, Lawson.

6.  DVT prophylaxis:  The patient is going to be placed on heparin 
subcutaneously.

 

TIME SPENT:  Approximately, 65 minutes was spent on admission of this patient, 
more than half that time was spent face-to-face with the patient during the 
interview and physical exam.



ADDENDUM:

1.  In regard to the treatment of the patient's likely subacute stroke, the 
patient is going to be added on Plavix to aspirin that he is already taking.

2.  Due to the chronic kidney insufficiency, CT angiogram of the head and neck 
is not going to be obtained, but I will obtain carotid Dopplers.

3.  The patient has a chronic indwelling Marshall catheter in place and his Marshall 
is going to be exchanged due to markedly abnormal urinalysis and likely Marshall- 
associated UTI.

 

 

[id number]/CPS #: [id number]

[id number] #: [id number]

MTDD

## 2018-06-23 LAB
BASOPHILS # BLD AUTO: 0 10^3/UL (ref 0–0.2)
EOSINOPHIL # BLD AUTO: 0.3 10^3/UL (ref 0–0.6)
HCT VFR BLD AUTO: 36 % (ref 42–52)
HGB BLD-MCNC: 12.3 G/DL (ref 14–18)
LYMPHOCYTES # BLD AUTO: 1.1 10^3/UL (ref 1–4.8)
MCH RBC QN AUTO: 29 PG (ref 27–31)
MCHC RBC AUTO-ENTMCNC: 35 G/DL (ref 31–36)
MCV RBC AUTO: 85 FL (ref 80–94)
MONOCYTES # BLD AUTO: 0.7 10^3/UL (ref 0–0.8)
NEUTROPHILS # BLD AUTO: 5.8 10^3/UL (ref 1.5–7.7)
NRBC # BLD AUTO: 0 10^3/UL
NRBC BLD QL AUTO: 0.2
PLATELET # BLD AUTO: 248 10^3/UL (ref 150–450)
RBC # BLD AUTO: 4.21 10^6/UL (ref 4–5.4)
WBC # BLD AUTO: 7.9 10^3/UL (ref 3.5–10.8)

## 2018-06-23 RX ADMIN — HEPARIN SODIUM SCH UNITS: 5000 INJECTION INTRAVENOUS; SUBCUTANEOUS at 22:14

## 2018-06-23 RX ADMIN — HEPARIN SODIUM SCH UNITS: 5000 INJECTION INTRAVENOUS; SUBCUTANEOUS at 12:59

## 2018-06-23 RX ADMIN — ATORVASTATIN CALCIUM SCH MG: 20 TABLET, FILM COATED ORAL at 19:40

## 2018-06-23 RX ADMIN — CLOPIDOGREL SCH MG: 75 TABLET, FILM COATED ORAL at 09:18

## 2018-06-23 RX ADMIN — HEPARIN SODIUM SCH: 5000 INJECTION INTRAVENOUS; SUBCUTANEOUS at 05:03

## 2018-06-23 RX ADMIN — INSULIN LISPRO SCH UNITS: 100 INJECTION, SOLUTION INTRAVENOUS; SUBCUTANEOUS at 12:59

## 2018-06-23 RX ADMIN — INSULIN LISPRO SCH UNITS: 100 INJECTION, SOLUTION INTRAVENOUS; SUBCUTANEOUS at 22:14

## 2018-06-23 RX ADMIN — INSULIN LISPRO SCH: 100 INJECTION, SOLUTION INTRAVENOUS; SUBCUTANEOUS at 17:29

## 2018-06-23 RX ADMIN — ASPIRIN 325 MG ORAL TABLET SCH MG: 325 PILL ORAL at 09:18

## 2018-06-23 RX ADMIN — SODIUM CHLORIDE SCH MLS/HR: 900 IRRIGANT IRRIGATION at 13:52

## 2018-06-23 RX ADMIN — INSULIN LISPRO SCH UNITS: 100 INJECTION, SOLUTION INTRAVENOUS; SUBCUTANEOUS at 09:18

## 2018-06-23 NOTE — PN
Subjective


Date of Service: 06/22/18


Interval History: 


.


patient stable.


MRI shows pontine stroke.


denies pain


eating well -- awaiting PT








Family History: Unchanged from Admission


Social History: Unchanged from Admission


Past Medical History: Unchanged from Admission





Objective


Active Medications: 


.


Acetaminophen (Tylenol Tab*)  650 mg PO Q4H PRN


   PRN Reason: FEVER/PAIN


Al Hydrox/Mg Hydrox/Simethicone (Maalox Plus*)  30 ml PO Q6H PRN


   PRN Reason: INDIGESTION


Aspirin (Aspirin Tab*)  325 mg PO DAILY [facility]


   Last Admin: 06/23/18 09:18 Dose:  325 mg


Atorvastatin Calcium (Lipitor*)  20 mg PO 2100 [facility]


   Last Admin: 06/22/18 21:20 Dose:  20 mg


Clopidogrel Bisulfate (Plavix Tab*)  75 mg PO 0900 [facility]


   Last Admin: 06/23/18 09:18 Dose:  75 mg


Dextrose (D50w Syringe 50 Ml*)  12.5 gm IV PUSH .FOR FS < 60 - SS PRN


   PRN Reason: FS < 60


Docusate Sodium (Colace Cap*)  100 mg PO BID PRN


   PRN Reason: CONSTIPATION


Heparin Sodium (Porcine) (Heparin Vial(*))  5,000 units SUBCUT Q8HR [facility]


   Last Admin: 06/23/18 12:59 Dose:  5,000 units


Sodium Chloride (Ns 0.9% 1000 Ml*)  1,000 mls @ 75 mls/hr IV PER RATE [facility]


   Last Admin: 06/23/18 13:52 Dose:  75 mls/hr


Insulin Human Lispro (Humalog*)  0 units SUBCUT ACHS [facility]; Protocol


   Last Admin: 06/23/18 17:29 Dose:  Not Given


Morphine Sulfate (Morphine Vial*)  1 mg IV Q4H PRN


   PRN Reason: PAIN


.


 Vital Signs - 8 hr











  06/23/18 06/23/18





  11:36 15:36


 


Temperature 98.2 F 97.9 F


 


Pulse Rate 80 80


 


Respiratory 16 20





Rate  


 


Blood Pressure 136/71 122/79





(mmHg)  


 


O2 Sat by Pulse 97 96





Oximetry  











Oxygen Devices in Use Now: Nasal Cannula


Appearance: NAD


Eyes: No Scleral Icterus


Ears/Nose/Mouth/Throat: Clear Oropharnyx


Neck: NL Appearance and Movements; NL JVP


Respiratory: Symmetrical Chest Expansion and Respiratory Effort


Cardiovascular: NL Sounds; No Murmurs; No JVD


Abdominal: NL Sounds; No Tenderness; No Distention


Lymphatic: No Cervical Adenopathy


Extremities: No Edema


Skin: No Rash or Ulcers


Neurological: Alert and Oriented x 3, - - weak L hand  and poor L leg 

extension.


Lines/Tubes/Other Access: Clean, Dry and Intact Peripheral IV


Nutrition: Taking PO's


Result Diagrams: 


 06/23/18 06:48





 06/23/18 06:48


Microbiology and Other Data: 


 Microbiology











 06/22/18 00:39 Urine Culture - Preliminary





 Urine    Citrobacter Youngae














Assess/Plan/Problems-Billing








85 y.o. man with metastatic prostate cancer with unsteady gait, falls and L-

side weakness  now worsening and MRI showing pontine stroke 








- Patient Problems


(1) Right pontine stroke


Current Visit: Yes   Status: Acute   Priority: High   Code(s): I63.50 - CEREB 

INFRC DUE TO UNSP OCCLS OR STENOS OF UNSP CEREB ARTERY   Comment: 


- PT/OT ordered


- Continue ASA/Plavix


- Echo noted


- Statin, HTN & DM control


- PMRU referral made   





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


Current Visit: Yes   Status: Chronic   Priority: High   Code(s): C79.82 - 

SECONDARY MALIGNANT NEOPLASM OF GENITAL ORGANS   Comment: 


- known urinary retention; sandoval in place   





(3) CKD stage 3 due to type 2 diabetes mellitus


Current Visit: Yes   Status: Chronic   Priority: High   Code(s): E11.22 - TYPE 

2 DIABETES MELLITUS W DIABETIC CHRONIC KIDNEY DISEASE; N18.3 - CHRONIC KIDNEY 

DISEASE, STAGE 3 (MODERATE)   Comment: 


- following Cr.   





(4) Diabetes mellitus


Current Visit: Yes   Status: Chronic   Priority: Medium   Code(s): E11.9 - TYPE 

2 DIABETES MELLITUS WITHOUT COMPLICATIONS   Comment: 


- glucose control as part of CVA mgmt.   





(5) Obstructive uropathy


Current Visit: Yes   Status: Chronic   Priority: High   Code(s): N13.9 - 

OBSTRUCTIVE AND REFLUX UROPATHY, UNSPECIFIED   Comment: 


- indwelling sandoval catheter

## 2018-06-23 NOTE — PN
Subjective


Date of Service: 06/23/18


Interval History: 





denies pain


about the same neurologically


eating dinner as I speak with him





Family History: Unchanged from Admission


Social History: Unchanged from Admission


Past Medical History: Unchanged from Admission





Objective


Active Medications: 


.


Acetaminophen (Tylenol Tab*)  650 mg PO Q4H PRN


   PRN Reason: FEVER/PAIN


Al Hydrox/Mg Hydrox/Simethicone (Maalox Plus*)  30 ml PO Q6H PRN


   PRN Reason: INDIGESTION


Aspirin (Aspirin Tab*)  325 mg PO DAILY [facility]


   Last Admin: 06/23/18 09:18 Dose:  325 mg


Atorvastatin Calcium (Lipitor*)  20 mg PO 2100 [facility]


   Last Admin: 06/22/18 21:20 Dose:  20 mg


Clopidogrel Bisulfate (Plavix Tab*)  75 mg PO 0900 [facility]


   Last Admin: 06/23/18 09:18 Dose:  75 mg


Dextrose (D50w Syringe 50 Ml*)  12.5 gm IV PUSH .FOR FS < 60 - SS PRN


   PRN Reason: FS < 60


Docusate Sodium (Colace Cap*)  100 mg PO BID PRN


   PRN Reason: CONSTIPATION


Heparin Sodium (Porcine) (Heparin Vial(*))  5,000 units SUBCUT Q8HR [facility]


   Last Admin: 06/23/18 12:59 Dose:  5,000 units


Sodium Chloride (Ns 0.9% 1000 Ml*)  1,000 mls @ 75 mls/hr IV PER RATE [facility]


   Last Admin: 06/23/18 13:52 Dose:  75 mls/hr


Insulin Human Lispro (Humalog*)  0 units SUBCUT ACHS [facility]; Protocol


   Last Admin: 06/23/18 17:29 Dose:  Not Given


Morphine Sulfate (Morphine Vial*)  1 mg IV Q4H PRN


   PRN Reason: PAIN


.


 Vital Signs - 8 hr











  06/23/18 06/23/18





  11:36 15:36


 


Temperature 98.2 F 97.9 F


 


Pulse Rate 80 80


 


Respiratory 16 20





Rate  


 


Blood Pressure 136/71 122/79





(mmHg)  


 


O2 Sat by Pulse 97 96





Oximetry  











Oxygen Devices in Use Now: Nasal Cannula


Appearance: NAD


Eyes: No Scleral Icterus


Ears/Nose/Mouth/Throat: Clear Oropharnyx


Neck: NL Appearance and Movements; NL JVP, Trachea Midline


Respiratory: Symmetrical Chest Expansion and Respiratory Effort


Cardiovascular: NL Sounds; No Murmurs; No JVD


Abdominal: NL Sounds; No Tenderness; No Distention


Lymphatic: No Cervical Adenopathy


Extremities: No Edema


Skin: No Rash or Ulcers


Neurological: Alert and Oriented x 3, - - continued left hand and leg weakness 

and left facial droop


Lines/Tubes/Other Access: Clean, Dry and Intact Peripheral IV


Nutrition: Taking PO's


Result Diagrams: 


 06/23/18 06:48





 06/23/18 06:48


Microbiology and Other Data: 


 Microbiology











 06/22/18 00:39 Urine Culture - Preliminary





 Urine    Citrobacter Youngae














Assess/Plan/Problems-Billing








85 y.o. man with metastatic prostate cancer with unsteady gait, falls and L-

side weakness ; now worsening and MRI showing pontine stroke 








- Patient Problems


(1) Right pontine stroke


Current Visit: Yes   Status: Acute   Priority: High   Code(s): I63.50 - CEREB 

INFRC DUE TO UNSP OCCLS OR STENOS OF UNSP CEREB ARTERY   Comment: 


- PT/OT ordered -- both disciplines speak to the need to therapy and 

improvement.


- Continue ASA/Plavix


- Echo noted


- Statin, HTN & DM control ongoing.


- PMRU referral made -- admission likely 6/25/18.   





(2) CKD stage 3 due to type 2 diabetes mellitus


Current Visit: Yes   Status: Chronic   Priority: High   Code(s): E11.22 - TYPE 

2 DIABETES MELLITUS W DIABETIC CHRONIC KIDNEY DISEASE; N18.3 - CHRONIC KIDNEY 

DISEASE, STAGE 3 (MODERATE)   SNOMED Code(s): 747430114425


   Comment: 


- following Cr.   





(3) Diabetes mellitus


Current Visit: Yes   Status: Chronic   Priority: Medium   Code(s): E11.9 - TYPE 

2 DIABETES MELLITUS WITHOUT COMPLICATIONS   SNOMED Code(s): 37959429


   Comment: 


- glucose control as part of CVA mgmt.   





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


Current Visit: Yes   Status: Chronic   Priority: High   Code(s): C79.82 - 

SECONDARY MALIGNANT NEOPLASM OF GENITAL ORGANS   SNOMED Code(s): 12277387


   Comment: 


- known urinary retention; sandoval in place   





(5) Obstructive uropathy


Current Visit: Yes   Status: Chronic   Priority: High   Code(s): N13.9 - 

OBSTRUCTIVE AND REFLUX UROPATHY, UNSPECIFIED   SNOMED Code(s): 5893913


   Comment: 


- indwelling sandoval catheter

## 2018-06-23 NOTE — CONS
CC:  Boy Bowers MD; Dr. Lavon Ibrahim  *

 

CONSULTATION REPORT:

 

DATE OF CONSULT:  06/22/18

 

ATTENDING PHYSICIAN:  Boy Bowers MD

 

PRIMARY CARE PROVIDER:  Dr. Lavon Ibrahim.

 

REASON FOR CONSULTATION:  Left-sided weakness and confusion.

 

HISTORY OF PRESENT ILLNESS:  Mr. Garcia is an 85-year-old gentleman who is a 
poor historian, has a history of metastatic prostate cancer with indwelling 
Marshall catheter, has a history of chronic kidney disease stage 3, diabetes, and 
hypertension.  He may also have some underlying dementia as well.  He has had 
worsening difficulty waking over the last 2 to 3 weeks.  The patient is 
somewhat unclear on the timeline, but states that about 3 weeks ago he started 
to have difficulty walking and he was falling to the left, this happened rather 
acutely, but he did not seek help at that time.  The son noted that he appeared 
to be disoriented and was falling and he was brought to the hospital for 
further workup. I did review his history and physical.  There is scant 
information at this point, but the patient states that he understands that he 
had a stroke and understands that the left-sided weakness is related.  He is 
perseverating tonight on a group of people that he says were involved in a 
study.  He keeps asking me about this over and over.  It is unclear to me what 
his mental status is at baseline.  He did have an MRI on the following studies.
  A carotid ultrasound done.  Elevated peak systolic velocities in the right 
internal carotid artery consistent with a right internal carotid artery 
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  No hemodynamically significant stenosis of the 
left internal carotid artery by flow velocity measurements.  Brain MRI films 
reviewed, subacute nonhemorrhagic infarct to the right parasagittal helen just 
above the pontine  stroke, diffuse involutional change with chronic 
small vessel ischemic change, small left mastoid effusion, mild sinus mucosal 
inflammatory disease without air-fluid level to suggest acute sinusitis.  I 
agree with the findings.  Echocardiogram has not been done.  Brain CT done last 
night on admission showed no acute intracranial pathology, did show some 
diffuse atrophy.  The patient was brought in on aspirin and due to the likely 
subacute stroke, was started on Plavix, currently on dual- antiplatelet.  His 
Marshall catheter was changed and he was started on ceftriaxone. Since his 
admission to the hospital, he has had no reported worsening.  He has had a 
bedside swallowing screen, did not tolerate thin liquids, was given nectar- 
thickened liquids, and Speech Therapy has been consulted.

 

PAST MEDICAL HISTORY:  As noted above.

 

MEDICATIONS:  His medications at home include:

1.  Aspirin 325 mg daily.

2.  Glyburide 5 mg p.o. b.i.d.

3.  Colace 100 mg b.i.d. p.r.n.

4.  Lupron.

 

ALLERGIES:  No known drug allergies.

 

FAMILY HISTORY:  Unknown.

 

SOCIAL HISTORY:  He states that he smoked in the past.  He lives alone.  Retied 
.  He quit smoking approximately 40 years ago.  Denies any drug or 
alcohol use.  He has a son, previously daughter, who underwent a transgender 
operation.

 

REVIEW OF SYSTEMS:  As noted above.  The patient currently notes some back pain 
related to his hospital bed, but denies any otherwise stated symptoms.  He does 
specifically note the weakness on the left side of his body.  Denies any pain.  
No headaches.  No vision changes.  No neck pain.  He does note some numbness in 
the left arm and leg.

 

PHYSICAL EXAMINATION:  Vital Signs:  Afebrile, temp of 98.5, pulse rate of 73, 
respiratory rate of 16, pulse ox of 97% to 99%, blood pressure 117/68 to 130/77 
and 136/62.  In general, he is a well-developed, although slightly malnourished 
gentleman, lying in his hospital bed.  He is pale.  Skin is warm and dry.  HEENT
: He is normocephalic, atraumatic.  Sclerae are anicteric.  Mucous membranes 
are moist.  Oropharynx is clear.  Nares are patent.  Neck is supple.  No 
thyromegaly. No carotid bruits.  No meningismus.  Chest:  Clear to auscultation 
bilaterally. Cardiovascular is regular rate and rhythm without murmurs.  
Abdomen is nontender. He has an indwelling catheter in place.  He has no 
significant clubbing, cyanosis, or edema in his extremities.  On neurologic exam
, he is awake, alert.  He is oriented to person.  He thought it was 2015.  He 
is oriented to place.  His speech is fluent.  There is no dysarthria.  
Repetition is intact.  Recall is somewhat impaired.  Cranial Nerves:  Pupils 
were equally round and reactive to light. Extraocular muscles are intact.  
There is no diplopia.  No nystagmus.  His face has some mild left lower droop.  
Sensation is mildly diminished in the left face. Tongue is midline.  Palate 
raises symmetrically.  Shoulder shrug is intact.  He spontaneously moves all 
extremities antigravity with good effort and good resistance, but does have 
some drift in the left upper extremity and left lower extremity.  He has 4+/5 
in the left upper extremity proximally and distally. Proximally in the left 
lower extremity, he is 4/5, distally 4+/5.  He has some difficulty holding his 
left leg off the bed.  On the right side, he is 5/5 throughout.  Tone is 
slightly down on the left side, normal on the right side. DTRs were difficult 
to assess, appeared to be down throughout.  No focality.  His Babinski's were 
withdrawal bilaterally.  Sensation, he notes 10% decrease in his light touch 
and pinprick on the left face, arm, and leg.  Finger-to-nose and rapid 
alternating movements were slow.  He had some difficulty on the left side.  No 
tremors were noted.  No cogwheeling.  Gait was not tested at this time.  He 
feels very unstable on his feet.  His affect was noted to be flat.

 

DIAGNOSTIC STUDIES/LAB DATA:  Lab work includes a white count of 7.2, 
hemoglobin of 12.9, and hematocrit of 38.  INR of 1.06.  Chemistry:  Sodium of 
133, chloride of 97, creatinine of 1.41, glucose of 274, lactic acid of 2.3.  
Blood glucose 204 to 174 to 245.  Albumin of 3.0.  LDL of 103, HDL of 28, 
triglycerides 123, cholesterol of 156.  PSA of 0.153 on 05/15/18.

 

Studies as noted above.

 

ASSESSMENT AND PLAN:  Mr. Garcia is an 85-year-old gentleman with a known 
history of metastatic prostate cancer, history of diabetes, hypertension, 
chronic kidney disease, presents to the hospital with a 3 to 4-week history of 
left-sided weakness, some sensory changes on the left side, decreased light 
touch and pinprick, difficulty walking with some falls, and some confusion.  
Tonight on examination, he does appear to be slightly confused, although he is 
oriented to person and place.  He had some difficulty with time.  He is 
spontaneously moving all extremities, but does have drift on the left and has 
evidence of weakness and sensory loss in the left face, arm, and leg consistent 
with his stroke.

 

The plan is to continue his physical therapy, occupational therapy.  Speech 
Therapy has been consulted.  Currently, he is on a regular diet with nectar-
thickened liquids.  Would continue his aspirin and Plavix has been added and we 
can do this for 30 days and then I would continue his Plavix only and stop his 
aspirin. Carotid ultrasound shows some evidence of stenosis.  Will need long-
term followup, but no intervention at this time.  Echocardiogram is pending.  
Given the fact his symptoms started 3 to 4 weeks ago, there is not much we can 
do acutely.  I would continue to control his blood pressure and diabetes 
tightly.  At this point, it is mainly going to be physical therapy, 
occupational therapy, and speech therapy with disposition to be determined.  He 
will likely need some outpatient therapy, possibly may qualify for inpatient PM 
and R.  I will continue to follow him closely and make further recommendations 
as necessary.

 

Thank you for the opportunity to participate in the care of this very 
interesting patient.

 

 [id number] #: [id number]

ALISA

## 2018-06-23 NOTE — PN
Subjective


Date of Service: 06/23/18


Interval History: 





No major issues overnight.  Remains weak on the left side.  Difficulty 

ambulating.  Eating good breakfast this am.  





Objective


Active Medications: 








Acetaminophen (Tylenol Tab*)  650 mg PO Q4H PRN


   PRN Reason: FEVER/PAIN


Al Hydrox/Mg Hydrox/Simethicone (Maalox Plus*)  30 ml PO Q6H PRN


   PRN Reason: INDIGESTION


Aspirin (Aspirin Tab*)  325 mg PO DAILY [facility]


   Last Admin: 06/22/18 07:47 Dose:  325 mg


Atorvastatin Calcium (Lipitor*)  20 mg PO 2100 [facility]


   Last Admin: 06/22/18 21:20 Dose:  20 mg


Clopidogrel Bisulfate (Plavix Tab*)  75 mg PO 0900 [facility]


   Last Admin: 06/22/18 07:47 Dose:  75 mg


Dextrose (D50w Syringe 50 Ml*)  12.5 gm IV PUSH .FOR FS < 60 - SS PRN


   PRN Reason: FS < 60


Docusate Sodium (Colace Cap*)  100 mg PO BID PRN


   PRN Reason: CONSTIPATION


Heparin Sodium (Porcine) (Heparin Vial(*))  5,000 units SUBCUT Q8HR [facility]


   Last Admin: 06/23/18 05:03 Dose:  Not Given


Sodium Chloride (Ns 0.9% 1000 Ml*)  1,000 mls @ 75 mls/hr IV PER RATE [facility]


   Last Admin: 06/22/18 22:55 Dose:  75 mls/hr


Insulin Human Lispro (Humalog*)  0 units SUBCUT ACHS [facility]; Protocol


   Last Admin: 06/22/18 21:20 Dose:  3 units


Morphine Sulfate (Morphine Vial*)  1 mg IV Q4H PRN


   PRN Reason: PAIN








 Vital Signs











  06/22/18 06/22/18 06/22/18





  12:39 16:15 20:00


 


Temperature 97.7 F 98.5 F 


 


Pulse Rate 82 73 


 


Respiratory 18 16 18





Rate   


 


Blood Pressure 130/77 136/62 





(mmHg)   


 


O2 Sat by Pulse 99 97 





Oximetry   














  06/22/18 06/22/18 06/23/18





  20:08 23:28 03:34


 


Temperature 98.3 F 99.1 F 98.3 F


 


Pulse Rate 91 94 83


 


Respiratory 20 20 20





Rate   


 


Blood Pressure 160/74 142/77 122/51





(mmHg)   


 


O2 Sat by Pulse 97 95 95





Oximetry   














  06/23/18 06/23/18





  06:49 08:00


 


Temperature 98.5 F 


 


Pulse Rate 83 


 


Respiratory 20 20





Rate  


 


Blood Pressure 160/91 





(mmHg)  


 


O2 Sat by Pulse 96 





Oximetry  











Oxygen Devices in Use Now: Nasal Cannula


Neurology Exam: 





General: 





HEENT: Normocephalic/atraumatic, sclera anicteric, mucous membranes moist





Neck: Supple





Chest: Clear to auscultation bilaterally 





Cardiovascular: Regular rate and rhythm





Abdomen: Soft, nontender/nondistended





Extremities: No clubbing, cyanosis, or edema








Neurological Findings: 





Awake, Alert, Oriented to person and place





Speech: fluent without dysarthria





Cranial Nerve: PEERL, EOM intact, VFF, no nystagmus, left lower facial droop 

present,  hearing intact to finger rub bilaterally, palate elevates 

symmetrically, tongue midline





Motor: Moving all extremities, good strength 5/5 right upper and lower.  4+/5 

left side with drift in LUE.  Tone is normal





Sensation: 10-20% reduction in LT/PP in the left arm and leg





Finger to nose, rapid alternating movements intact without tremor








Result Diagrams: 


 06/23/18 06:48





 06/23/18 06:48





Assessment/Plan





86 y/o with history of metastatic prostate cancer, chronic indwelling cath with 

evidence of colonization vs UTI with unsteady gait, falls and left sided 

weakness for several weeks, subacute pontine stroke on MRI.





--At this point, care is largely supportive with PT/OT paramount.


--Continue ASA/Plavix


--Follow up Echo


--Statin, BP control, DM control


--Consider inpatient PMR evaluation





I will sign off for now.  Please schedule follow up appointment with me in 4 

weeks outpatient.  I remain available for any new issues or concerns

## 2018-06-24 RX ADMIN — INSULIN LISPRO SCH: 100 INJECTION, SOLUTION INTRAVENOUS; SUBCUTANEOUS at 21:49

## 2018-06-24 RX ADMIN — HEPARIN SODIUM SCH UNITS: 5000 INJECTION INTRAVENOUS; SUBCUTANEOUS at 06:20

## 2018-06-24 RX ADMIN — ATORVASTATIN CALCIUM SCH MG: 20 TABLET, FILM COATED ORAL at 21:44

## 2018-06-24 RX ADMIN — INSULIN LISPRO SCH UNITS: 100 INJECTION, SOLUTION INTRAVENOUS; SUBCUTANEOUS at 13:25

## 2018-06-24 RX ADMIN — INSULIN LISPRO SCH UNITS: 100 INJECTION, SOLUTION INTRAVENOUS; SUBCUTANEOUS at 09:29

## 2018-06-24 RX ADMIN — ASPIRIN 325 MG ORAL TABLET SCH MG: 325 PILL ORAL at 09:28

## 2018-06-24 RX ADMIN — CLOPIDOGREL SCH MG: 75 TABLET, FILM COATED ORAL at 09:29

## 2018-06-24 RX ADMIN — HEPARIN SODIUM SCH UNITS: 5000 INJECTION INTRAVENOUS; SUBCUTANEOUS at 13:26

## 2018-06-24 RX ADMIN — SODIUM CHLORIDE SCH MLS/HR: 900 IRRIGANT IRRIGATION at 04:09

## 2018-06-24 RX ADMIN — HEPARIN SODIUM SCH UNITS: 5000 INJECTION INTRAVENOUS; SUBCUTANEOUS at 21:43

## 2018-06-24 RX ADMIN — INSULIN LISPRO SCH UNITS: 100 INJECTION, SOLUTION INTRAVENOUS; SUBCUTANEOUS at 17:12

## 2018-06-24 NOTE — PN
Subjective


Date of Service: 06/24/18


Interval History: 


.


no new s/sx reported.


still with L sided weakness and work in the PT and OT disciplines.





denies HA.


eating well


anticipate PMRU admission 6/25/18.








Family History: Unchanged from Admission


Social History: Unchanged from Admission


Past Medical History: Unchanged from Admission





Objective


Active Medications: 


.


Acetaminophen (Tylenol Tab*)  650 mg PO Q4H PRN


   PRN Reason: FEVER/PAIN


   Last Admin: 06/23/18 19:40 Dose:  650 mg


Al Hydrox/Mg Hydrox/Simethicone (Maalox Plus*)  30 ml PO Q6H PRN


   PRN Reason: INDIGESTION


Aspirin (Aspirin Tab*)  325 mg PO DAILY [facility]


   Last Admin: 06/24/18 09:28 Dose:  325 mg


Atorvastatin Calcium (Lipitor*)  20 mg PO 2100 [facility]


   Last Admin: 06/23/18 19:40 Dose:  20 mg


Clopidogrel Bisulfate (Plavix Tab*)  75 mg PO 0900 [facility]


   Last Admin: 06/24/18 09:29 Dose:  75 mg


Dextrose (D50w Syringe 50 Ml*)  12.5 gm IV PUSH .FOR FS < 60 - SS PRN


   PRN Reason: FS < 60


Docusate Sodium (Colace Cap*)  100 mg PO BID PRN


   PRN Reason: CONSTIPATION


Heparin Sodium (Porcine) (Heparin Vial(*))  5,000 units SUBCUT Q8HR [facility]


   Last Admin: 06/24/18 13:26 Dose:  5,000 units


Sodium Chloride (Ns 0.9% 1000 Ml*)  1,000 mls @ 75 mls/hr IV PER RATE [facility]


   Last Admin: 06/24/18 04:09 Dose:  75 mls/hr


Insulin Human Lispro (Humalog*)  0 units SUBCUT ACHS [facility]; Protocol


   Last Admin: 06/24/18 17:12 Dose:  2 units


Morphine Sulfate (Morphine Vial*)  1 mg IV Q4H PRN


   PRN Reason: PAIN








 Vital Signs - 8 hr











  06/24/18 06/24/18





  11:49 15:42


 


Temperature 97.3 F 98.4 F


 


Pulse Rate 72 81


 


Respiratory 20 20





Rate  


 


Blood Pressure 139/55 124/54





(mmHg)  


 


O2 Sat by Pulse 93 93





Oximetry  











Oxygen Devices in Use Now: Nasal Cannula


Appearance: NAD


Eyes: No Scleral Icterus, PERRLA


Ears/Nose/Mouth/Throat: NL Teeth, Lips, Gums, Mucous Membranes Moist


Neck: NL Appearance and Movements; NL JVP, No Thyroid Enlargement, Masses


Respiratory: Symmetrical Chest Expansion and Respiratory Effort, Clear to 

Auscultation


Cardiovascular: NL Sounds; No Murmurs; No JVD


Abdominal: NL Sounds; No Tenderness; No Distention


Lymphatic: No Cervical Adenopathy


Extremities: No Edema


Skin: No Rash or Ulcers


Neurological: Alert and Oriented x 3, - - left sided weakness as before. no 

change.


Lines/Tubes/Other Access: Clean, Dry and Intact Peripheral IV


Nutrition: Taking PO's


Result Diagrams: 


 06/23/18 06:48





 06/23/18 06:48


Microbiology and Other Data: 


 Microbiology











 06/22/18 00:39 Urine Culture - Preliminary





 Urine    Citrobacter Youngae














Assess/Plan/Problems-Billing








85 y.o. man with metastatic prostate cancer with unsteady gait, falls and L-

side weakness ; now worsening and MRI showing pontine stroke 








- Patient Problems


(1) Right pontine stroke


Current Visit: Yes   Status: Acute   Priority: High   Code(s): I63.50 - CEREB 

INFRC DUE TO UNSP OCCLS OR STENOS OF UNSP CEREB ARTERY   Comment: 


- PT/OT evaluations -- both disciplines speak to the need to therapy and 

improvement.


- Continue ASA/Plavix


- Echo noted


- Statin, HTN & DM control ongoing.


- PMRU referral made -- admission likely 6/25/18.   





(2) CKD stage 3 due to type 2 diabetes mellitus


Current Visit: Yes   Status: Chronic   Priority: High   Code(s): E11.22 - TYPE 

2 DIABETES MELLITUS W DIABETIC CHRONIC KIDNEY DISEASE; N18.3 - CHRONIC KIDNEY 

DISEASE, STAGE 3 (MODERATE)   Comment: 


- following Cr.   





(3) Diabetes mellitus


Current Visit: Yes   Status: Chronic   Priority: Medium   Code(s): E11.9 - TYPE 

2 DIABETES MELLITUS WITHOUT COMPLICATIONS   Comment: 


- glucose control as part of CVA mgmt.   





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


Current Visit: Yes   Status: Chronic   Priority: High   Code(s): C79.82 - 

SECONDARY MALIGNANT NEOPLASM OF GENITAL ORGANS   Comment: 


- known urinary retention; sandoval in place   





(5) Obstructive uropathy


Current Visit: Yes   Status: Chronic   Priority: High   Code(s): N13.9 - 

OBSTRUCTIVE AND REFLUX UROPATHY, UNSPECIFIED   Comment: 


- indwelling sandoval catheter

## 2018-06-25 LAB
BASOPHILS # BLD AUTO: 0.1 10^3/UL (ref 0–0.2)
EOSINOPHIL # BLD AUTO: 0.5 10^3/UL (ref 0–0.6)
HCT VFR BLD AUTO: 35 % (ref 42–52)
HGB BLD-MCNC: 12.2 G/DL (ref 14–18)
LYMPHOCYTES # BLD AUTO: 1.1 10^3/UL (ref 1–4.8)
MCH RBC QN AUTO: 30 PG (ref 27–31)
MCHC RBC AUTO-ENTMCNC: 35 G/DL (ref 31–36)
MCV RBC AUTO: 86 FL (ref 80–94)
MONOCYTES # BLD AUTO: 0.9 10^3/UL (ref 0–0.8)
NEUTROPHILS # BLD AUTO: 9.2 10^3/UL (ref 1.5–7.7)
NRBC # BLD AUTO: 0 10^3/UL
NRBC BLD QL AUTO: 0
PLATELET # BLD AUTO: 279 10^3/UL (ref 150–450)
RBC # BLD AUTO: 4.11 10^6/UL (ref 4–5.4)
WBC # BLD AUTO: 11.8 10^3/UL (ref 3.5–10.8)

## 2018-06-25 RX ADMIN — INSULIN LISPRO SCH UNITS: 100 INJECTION, SOLUTION INTRAVENOUS; SUBCUTANEOUS at 21:06

## 2018-06-25 RX ADMIN — SODIUM CHLORIDE SCH MLS/HR: 900 IRRIGANT IRRIGATION at 03:59

## 2018-06-25 RX ADMIN — HEPARIN SODIUM SCH UNITS: 5000 INJECTION INTRAVENOUS; SUBCUTANEOUS at 21:06

## 2018-06-25 RX ADMIN — ASPIRIN 325 MG ORAL TABLET SCH MG: 325 PILL ORAL at 08:11

## 2018-06-25 RX ADMIN — INSULIN LISPRO SCH UNITS: 100 INJECTION, SOLUTION INTRAVENOUS; SUBCUTANEOUS at 12:18

## 2018-06-25 RX ADMIN — ATORVASTATIN CALCIUM SCH MG: 20 TABLET, FILM COATED ORAL at 21:06

## 2018-06-25 RX ADMIN — SODIUM CHLORIDE SCH MLS/HR: 900 IRRIGANT IRRIGATION at 16:57

## 2018-06-25 RX ADMIN — INSULIN LISPRO SCH UNITS: 100 INJECTION, SOLUTION INTRAVENOUS; SUBCUTANEOUS at 08:10

## 2018-06-25 RX ADMIN — INSULIN LISPRO SCH UNITS: 100 INJECTION, SOLUTION INTRAVENOUS; SUBCUTANEOUS at 16:57

## 2018-06-25 RX ADMIN — CLOPIDOGREL SCH MG: 75 TABLET, FILM COATED ORAL at 08:11

## 2018-06-25 RX ADMIN — HEPARIN SODIUM SCH UNITS: 5000 INJECTION INTRAVENOUS; SUBCUTANEOUS at 05:28

## 2018-06-25 RX ADMIN — HEPARIN SODIUM SCH UNITS: 5000 INJECTION INTRAVENOUS; SUBCUTANEOUS at 13:24

## 2018-06-26 ENCOUNTER — HOSPITAL ENCOUNTER (INPATIENT)
Dept: HOSPITAL 25 - PMRU | Age: 83
LOS: 30 days | Discharge: ANOTHER HOSPITAL | DRG: 982 | End: 2018-07-26
Attending: PHYSICAL MEDICINE & REHABILITATION | Admitting: PHYSICAL MEDICINE & REHABILITATION
Payer: MEDICARE

## 2018-06-26 VITALS — SYSTOLIC BLOOD PRESSURE: 132 MMHG | DIASTOLIC BLOOD PRESSURE: 61 MMHG

## 2018-06-26 DIAGNOSIS — C61: ICD-10-CM

## 2018-06-26 DIAGNOSIS — D62: ICD-10-CM

## 2018-06-26 DIAGNOSIS — I69.391: ICD-10-CM

## 2018-06-26 DIAGNOSIS — R55: ICD-10-CM

## 2018-06-26 DIAGNOSIS — Z79.82: ICD-10-CM

## 2018-06-26 DIAGNOSIS — I69.354: Primary | ICD-10-CM

## 2018-06-26 DIAGNOSIS — Z79.899: ICD-10-CM

## 2018-06-26 DIAGNOSIS — Z79.4: ICD-10-CM

## 2018-06-26 DIAGNOSIS — I12.9: ICD-10-CM

## 2018-06-26 DIAGNOSIS — C79.51: ICD-10-CM

## 2018-06-26 DIAGNOSIS — C79.82: ICD-10-CM

## 2018-06-26 DIAGNOSIS — N39.0: ICD-10-CM

## 2018-06-26 DIAGNOSIS — R31.0: ICD-10-CM

## 2018-06-26 DIAGNOSIS — Z79.02: ICD-10-CM

## 2018-06-26 DIAGNOSIS — E11.22: ICD-10-CM

## 2018-06-26 DIAGNOSIS — F32.9: ICD-10-CM

## 2018-06-26 DIAGNOSIS — C79.19: ICD-10-CM

## 2018-06-26 DIAGNOSIS — K59.00: ICD-10-CM

## 2018-06-26 DIAGNOSIS — N18.3: ICD-10-CM

## 2018-06-26 DIAGNOSIS — K64.9: ICD-10-CM

## 2018-06-26 DIAGNOSIS — E86.0: ICD-10-CM

## 2018-06-26 PROCEDURE — F07Z5ZZ BED MOBILITY TREATMENT: ICD-10-PCS | Performed by: PHYSICAL MEDICINE & REHABILITATION

## 2018-06-26 PROCEDURE — 0TBD8ZX EXCISION OF URETHRA, VIA NATURAL OR ARTIFICIAL OPENING ENDOSCOPIC, DIAGNOSTIC: ICD-10-PCS | Performed by: PHYSICAL MEDICINE & REHABILITATION

## 2018-06-26 PROCEDURE — 81015 MICROSCOPIC EXAM OF URINE: CPT

## 2018-06-26 PROCEDURE — F08Z3ZZ FEEDING/EATING TREATMENT: ICD-10-PCS | Performed by: PHYSICAL MEDICINE & REHABILITATION

## 2018-06-26 PROCEDURE — 0T9D8ZZ DRAINAGE OF URETHRA, VIA NATURAL OR ARTIFICIAL OPENING ENDOSCOPIC: ICD-10-PCS | Performed by: PHYSICAL MEDICINE & REHABILITATION

## 2018-06-26 PROCEDURE — F07Z8ZZ TRANSFER TRAINING TREATMENT: ICD-10-PCS | Performed by: PHYSICAL MEDICINE & REHABILITATION

## 2018-06-26 PROCEDURE — 71046 X-RAY EXAM CHEST 2 VIEWS: CPT

## 2018-06-26 PROCEDURE — F08Z0ZZ BATHING/SHOWERING TECHNIQUES TREATMENT: ICD-10-PCS | Performed by: PHYSICAL MEDICINE & REHABILITATION

## 2018-06-26 PROCEDURE — 81003 URINALYSIS AUTO W/O SCOPE: CPT

## 2018-06-26 PROCEDURE — F07Z9ZZ GAIT TRAINING/FUNCTIONAL AMBULATION TREATMENT: ICD-10-PCS | Performed by: PHYSICAL MEDICINE & REHABILITATION

## 2018-06-26 PROCEDURE — 84443 ASSAY THYROID STIM HORMONE: CPT

## 2018-06-26 PROCEDURE — 77014: CPT

## 2018-06-26 PROCEDURE — 87086 URINE CULTURE/COLONY COUNT: CPT

## 2018-06-26 PROCEDURE — A9503 TC99M MEDRONATE: HCPCS

## 2018-06-26 PROCEDURE — 80053 COMPREHEN METABOLIC PANEL: CPT

## 2018-06-26 PROCEDURE — 74176 CT ABD & PELVIS W/O CONTRAST: CPT

## 2018-06-26 PROCEDURE — 70450 CT HEAD/BRAIN W/O DYE: CPT

## 2018-06-26 PROCEDURE — 85027 COMPLETE CBC AUTOMATED: CPT

## 2018-06-26 PROCEDURE — 36415 COLL VENOUS BLD VENIPUNCTURE: CPT

## 2018-06-26 PROCEDURE — 84403 ASSAY OF TOTAL TESTOSTERONE: CPT

## 2018-06-26 PROCEDURE — 85025 COMPLETE CBC W/AUTO DIFF WBC: CPT

## 2018-06-26 PROCEDURE — F08Z1ZZ DRESSING TECHNIQUES TREATMENT: ICD-10-PCS | Performed by: PHYSICAL MEDICINE & REHABILITATION

## 2018-06-26 PROCEDURE — 84153 ASSAY OF PSA TOTAL: CPT

## 2018-06-26 PROCEDURE — 87040 BLOOD CULTURE FOR BACTERIA: CPT

## 2018-06-26 PROCEDURE — 78306 BONE IMAGING WHOLE BODY: CPT

## 2018-06-26 PROCEDURE — G0103 PSA SCREENING: HCPCS

## 2018-06-26 PROCEDURE — 93005 ELECTROCARDIOGRAM TRACING: CPT

## 2018-06-26 PROCEDURE — 80048 BASIC METABOLIC PNL TOTAL CA: CPT

## 2018-06-26 RX ADMIN — INSULIN LISPRO SCH UNITS: 100 INJECTION, SOLUTION INTRAVENOUS; SUBCUTANEOUS at 21:18

## 2018-06-26 RX ADMIN — INSULIN LISPRO SCH: 100 INJECTION, SOLUTION INTRAVENOUS; SUBCUTANEOUS at 08:29

## 2018-06-26 RX ADMIN — INSULIN LISPRO SCH UNITS: 100 INJECTION, SOLUTION INTRAVENOUS; SUBCUTANEOUS at 12:25

## 2018-06-26 RX ADMIN — ASPIRIN 325 MG ORAL TABLET SCH MG: 325 PILL ORAL at 09:18

## 2018-06-26 RX ADMIN — SODIUM CHLORIDE SCH MLS/HR: 900 IRRIGANT IRRIGATION at 06:51

## 2018-06-26 RX ADMIN — DOCUSATE SODIUM SCH MG: 100 CAPSULE, LIQUID FILLED ORAL at 21:19

## 2018-06-26 RX ADMIN — ATORVASTATIN CALCIUM SCH MG: 20 TABLET, FILM COATED ORAL at 16:20

## 2018-06-26 RX ADMIN — CIPROFLOXACIN SCH MG: 500 TABLET, FILM COATED ORAL at 21:19

## 2018-06-26 RX ADMIN — HEPARIN SODIUM SCH UNITS: 5000 INJECTION INTRAVENOUS; SUBCUTANEOUS at 05:51

## 2018-06-26 RX ADMIN — CLOPIDOGREL SCH MG: 75 TABLET, FILM COATED ORAL at 09:19

## 2018-06-26 RX ADMIN — INSULIN LISPRO SCH UNITS: 100 INJECTION, SOLUTION INTRAVENOUS; SUBCUTANEOUS at 16:30

## 2018-06-26 NOTE — PN
Hospitalist Progress Note


Date of Service: 06/26/18


.


HOSPITALIST DISCHARGE NOTE:





See dc instructions and summary by me.


Patient stable for dc


dc instructions reviewed with the patient at the bedside.





DC patient to PMRU today.

## 2018-06-26 NOTE — PN
Subjective


Date of Service: 06/25/18


Interval History: 


.


patient with ongoing left arm weakness...perhaps a bit worse than previously.


PT felt his performance was worse than earlier.


on maximal medical therapy...ASA/Plavix/Statin. glucose/htn well controlled.


I explained his strength can vacillate for a while. he is eager to do some sort 

of formal rehab -- cooperative with therapists.





eating well.





ultimately wants to get to independent living.


.


Family History: Unchanged from Admission


Social History: Unchanged from Admission


Past Medical History: Unchanged from Admission





Objective


Active Medications: 


.


Acetaminophen (Tylenol Tab*)  650 mg PO Q4H PRN


   PRN Reason: FEVER/PAIN


   Last Admin: 06/23/18 19:40 Dose:  650 mg


Al Hydrox/Mg Hydrox/Simethicone (Maalox Plus*)  30 ml PO Q6H PRN


   PRN Reason: INDIGESTION


Aspirin (Aspirin Tab*)  325 mg PO DAILY [facility]


   Last Admin: 06/25/18 08:11 Dose:  325 mg


Atorvastatin Calcium (Lipitor*)  20 mg PO 2100 [facility]


   Last Admin: 06/25/18 21:06 Dose:  20 mg


Clopidogrel Bisulfate (Plavix Tab*)  75 mg PO 0900 [facility]


   Last Admin: 06/25/18 08:11 Dose:  75 mg


Dextrose (D50w Syringe 50 Ml*)  12.5 gm IV PUSH .FOR FS < 60 - SS PRN


   PRN Reason: FS < 60


Docusate Sodium (Colace Cap*)  100 mg PO BID PRN


   PRN Reason: CONSTIPATION


   Last Admin: 06/25/18 06:09 Dose:  100 mg


Heparin Sodium (Porcine) (Heparin Vial(*))  5,000 units SUBCUT Q8HR [facility]


   Last Admin: 06/26/18 05:51 Dose:  5,000 units


Sodium Chloride (Ns 0.9% 1000 Ml*)  1,000 mls @ 75 mls/hr IV PER RATE [facility]


   Last Admin: 06/26/18 06:51 Dose:  75 mls/hr


Insulin Human Lispro (Humalog*)  0 units SUBCUT ACHS [facility]; Protocol


   Last Admin: 06/25/18 21:06 Dose:  6 units


Morphine Sulfate (Morphine Vial*)  1 mg IV Q4H PRN


   PRN Reason: PAIN


.


 Vital Signs - 8 hr











  06/26/18 06/26/18





  00:07 04:00


 


Temperature 98.3 F 98.4 F


 


Pulse Rate 77 74


 


Respiratory 24 18





Rate  


 


Blood Pressure 134/58 129/63





(mmHg)  


 


O2 Sat by Pulse 96 94





Oximetry  











Oxygen Devices in Use Now: Nasal Cannula


Appearance: elderly, frail


Eyes: No Scleral Icterus


Ears/Nose/Mouth/Throat: Clear Oropharnyx


Neck: NL Appearance and Movements; NL JVP


Respiratory: Symmetrical Chest Expansion and Respiratory Effort


Cardiovascular: NL Sounds; No Murmurs; No JVD


Abdominal: NL Sounds; No Tenderness; No Distention


Lymphatic: No Cervical Adenopathy


Extremities: No Edema


Skin: No Rash or Ulcers


Neurological: Alert and Oriented x 3, - - left arm and leg weakness noted.


Lines/Tubes/Other Access: Clean, Dry and Intact Peripheral IV


Nutrition: Taking PO's


Result Diagrams: 


 06/25/18 15:29





 06/25/18 15:29


Microbiology and Other Data: 


 Microbiology











 06/22/18 00:39 Urine Culture - Preliminary





 Urine    Citrobacter Youngae














Assess/Plan/Problems-Billing








85 y.o. man with metastatic prostate cancer with unsteady gait, falls and L-

side weakness ; now worsening and MRI showing pontine stroke 





6/26: U culture + for citrobacter...with abnormal UA; will treat.





- Patient Problems


(1) Right pontine stroke


Current Visit: Yes   Status: Acute   Priority: High   Code(s): I63.50 - CEREB 

INFRC DUE TO UNSP OCCLS OR STENOS OF UNSP CEREB ARTERY   Comment: 


- PT/OT evaluations -- both disciplines speak to the need to therapy and 

improvement.


- Continue ASA/Plavix


- Echo noted


- Statin, HTN & DM control ongoing.


- PMRU referral made -- admission pending   





(2) CKD stage 3 due to type 2 diabetes mellitus


Current Visit: Yes   Status: Chronic   Priority: High   Code(s): E11.22 - TYPE 

2 DIABETES MELLITUS W DIABETIC CHRONIC KIDNEY DISEASE; N18.3 - CHRONIC KIDNEY 

DISEASE, STAGE 3 (MODERATE)   Comment: 


- following Cr.   





(3) Diabetes mellitus


Current Visit: Yes   Status: Chronic   Priority: Medium   Code(s): E11.9 - TYPE 

2 DIABETES MELLITUS WITHOUT COMPLICATIONS   Comment: 


- glucose control as part of CVA mgmt.   





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


Current Visit: Yes   Status: Chronic   Priority: High   Code(s): C79.82 - 

SECONDARY MALIGNANT NEOPLASM OF GENITAL ORGANS   Comment: 


- known urinary retention; sandoval in place   





(5) Obstructive uropathy


Current Visit: Yes   Status: Chronic   Priority: High   Code(s): N13.9 - 

OBSTRUCTIVE AND REFLUX UROPATHY, UNSPECIFIED   Comment: 


- indwelling sandoval catheter   





(6) Urinary tract infection


Current Visit: Yes   Status: Acute   Priority: High   Comment: 


- was present on admission


- ciprofloxacin 500 mg PO BID


- change sandoval after first dose of Abx.

## 2018-06-27 LAB
BASOPHILS # BLD AUTO: 0.1 10^3/UL (ref 0–0.2)
EOSINOPHIL # BLD AUTO: 0.4 10^3/UL (ref 0–0.6)
HCT VFR BLD AUTO: 34 % (ref 42–52)
HGB BLD-MCNC: 11.6 G/DL (ref 14–18)
LYMPHOCYTES # BLD AUTO: 1.2 10^3/UL (ref 1–4.8)
MCH RBC QN AUTO: 29 PG (ref 27–31)
MCHC RBC AUTO-ENTMCNC: 34 G/DL (ref 31–36)
MCV RBC AUTO: 85 FL (ref 80–94)
MONOCYTES # BLD AUTO: 0.9 10^3/UL (ref 0–0.8)
NEUTROPHILS # BLD AUTO: 6.6 10^3/UL (ref 1.5–7.7)
NRBC # BLD AUTO: 0 10^3/UL
NRBC BLD QL AUTO: 0
PLATELET # BLD AUTO: 264 10^3/UL (ref 150–450)
RBC # BLD AUTO: 3.97 10^6/UL (ref 4–5.4)
WBC # BLD AUTO: 9.2 10^3/UL (ref 3.5–10.8)

## 2018-06-27 RX ADMIN — INSULIN LISPRO SCH UNITS: 100 INJECTION, SOLUTION INTRAVENOUS; SUBCUTANEOUS at 17:00

## 2018-06-27 RX ADMIN — ASPIRIN SCH MG: 325 TABLET, COATED ORAL at 08:47

## 2018-06-27 RX ADMIN — ATORVASTATIN CALCIUM SCH MG: 20 TABLET, FILM COATED ORAL at 16:59

## 2018-06-27 RX ADMIN — DOCUSATE SODIUM SCH MG: 100 CAPSULE, LIQUID FILLED ORAL at 08:48

## 2018-06-27 RX ADMIN — CIPROFLOXACIN SCH MG: 500 TABLET, FILM COATED ORAL at 08:47

## 2018-06-27 RX ADMIN — INSULIN LISPRO SCH UNITS: 100 INJECTION, SOLUTION INTRAVENOUS; SUBCUTANEOUS at 08:46

## 2018-06-27 RX ADMIN — DOCUSATE SODIUM SCH MG: 100 CAPSULE, LIQUID FILLED ORAL at 20:09

## 2018-06-27 RX ADMIN — CIPROFLOXACIN SCH MG: 500 TABLET, FILM COATED ORAL at 20:09

## 2018-06-27 RX ADMIN — CLOPIDOGREL SCH MG: 75 TABLET, FILM COATED ORAL at 08:47

## 2018-06-27 RX ADMIN — INSULIN LISPRO SCH UNITS: 100 INJECTION, SOLUTION INTRAVENOUS; SUBCUTANEOUS at 21:49

## 2018-06-27 RX ADMIN — INSULIN LISPRO SCH UNITS: 100 INJECTION, SOLUTION INTRAVENOUS; SUBCUTANEOUS at 11:53

## 2018-06-27 NOTE — DS
CC:  Dr. Lavon Ibrahim; Dr. Bacon *

 

DISCHARGE/TRANSFER SUMMARY:

 

DATE OF ADMISSION:  06/22/18

 

DATE OF DISCHARGE:  06/26/18

 

PRIMARY CARE PROVIDER:  Dr. Lavon Ibrahim

 

UROLOGIST:  Dr. Raúl Bacon

 

STATUS DURING HOSPITALIZATION:  Inpatient.

 

PRINCIPAL DISCHARGE DIAGNOSES:  Right pontine stroke with left-sided weakness, 
upper and lower extremity as well as urinary tract infection - citrobacter - 
treated with antibiotic regimen.

 

SECONDARY DIAGNOSES:

1.  History of metastatic prostate cancer with indwelling Marshall catheter under 
the care of Dr. Bacon.

2.  Diabetes - type 2 - non-insulin-dependent.

3.  Hypertension.

4.  Chronic kidney disease - stage 3.

 

TRANSFER/DISCHARGE MEDICATION REGIMEN:

1.  Aspirin 325 mg by mouth daily.

2.  Plavix 75 mg by mouth daily.

3.  Lipitor 20 mg by mouth daily.

4.  Lupron as per Dr. Bacon's orders.

5.  Colace 100 mg by mouth twice daily.

6.  Glyburide 5 mg by mouth twice daily.

7.  Ciprofloxacin 500 mg by mouth twice daily for 3 days, then stop (UTI 
treatment).

8.  Subcu heparin 5000 units q.8 hours DVT prophylaxis.

9.  Lispro low dose sliding scale BMI 25 to 30.

10.  Tylenol 650 mg by mouth every 4 hours p.r.n. pain/fever.

 

HISTORY OF PRESENT ILLNESS AND HOSPITAL COURSE:  Please see the H and P by Dr. Roselyn Huddleston on 

06/22/18 as well as the subsequent neurology consultation by Dr. Chaz Kiran on the same date.  In brief, Mr. Garcia is an 85-year-old gentleman with 
history of metastatic prostate cancer and indwelling Marshall catheter who came to 
the hospital with complaints of an unsteady gait and falls over the past 
several weeks.  The patient has lost approximately 40 pounds over that 
timeframe.  He is a poor historian.  His family states he has been disoriented 
occasionally.  The patient had left-sided weakness in upper and lower extremity 
and has a working diagnosis of a CVA.  The patient proceeded with an MRI study 
and that showed a right pontine stroke.  The patient is vacillating with 
respect to his strength and sensory function.  He was seen by PT and OT who 
both agreed there was rehabilitation potential across both disciplines.  The 
patient was seen by Dr. Mesfin Mota and deemed a good candidate for the 
physical medicine rehabilitation unit.  I agree with that assessment.  The 
patient is discharged there in stable condition today, 06/26/18.  The 
hospitalist service can follow if needed.  The patient is stable at the time of 
discharge.  Of course, he can come back to the inpatient service if he has 
symptomatology that is worrisome or uncontrollable.  At this time, the patient 
is doing quite well.

 

TIME SPENT:  Total time taken to discharge/transfer Mr. Garcia on 06/26/18 was 
45 minutes, greater than half of that time was spent going over the discharge 
instructions face-to-face with the patient.

 

 [id number]/[facility] #: [id number]

ALISA

## 2018-06-27 NOTE — HP
ADMISSION HISTORY AND PHYSICAL:

 

DATE OF ADMISSION:  06/26/18

 

REASON FOR ADMISSION:  Right pontine infarct with left hemiplegia.

 

HISTORY OF PRESENT ILLNESS:  Samuel Garcia is an 85-year-old male.  He has a 
medical history significant for diabetes mellitus as well as metastatic 
prostate cancer. He has an indwelling Marshall catheter.  The patient had some 
difficulty walking over the past 2 weeks.  His son says he had fallen at least 
once.  The son comes to visit him on a daily basis.  The patient had fallen 
again somewhere around 06/20/18.  He was found in bed by his son and he could 
not get out on 06/21/18. 911 was called and he was brought by ambulance to 
[facility].  At the hospital, he was found to have left-sided 
weakness.  He was admitted with a probable stroke.  He had a CAT scan of his 
brain, which was not significant for any new findings.  The following day, he 
had a MRI of his brain showing right pontine infarct.  He had a consult with 
Dr. Kiran, the neurologist.  Dr. Kiran recommended adding Plavix to his aspirin.  
The patient otherwise was medically stable.  He was felt to have physical 
therapy and occupational therapy needs as well as speech therapy needs.  He is 
now being admitted for inpatient rehab so that he might return to independent 
living.

 

PAST MEDICAL HISTORY:  Significant for the aforementioned metastatic prostate 
cancer.  He has a history of diabetes mellitus, chronic kidney disease stage 3, 
as well as hypertension.  There may be some underlying dementia as well.

 

CURRENT MEDICATIONS:  Include:

1.  Aspirin.

2.  Lipitor.

3.  Plavix.

4.  He was on heparin for DVT prophylaxis.

5.  Cipro.

6.  Lispro insulin coverage.

 

ALLERGIES:  No known drug allergies.

 

SOCIAL HISTORY:  He is a nonsmoker, nondrinker.  Lives by himself in a house.  
His son would visit him every day.

 

Discharge plan is tentatively to go to his son's house and live with his son 
and his ex-wife.

 

REVIEW OF SYSTEMS:  No current shortness of breath or chest pain.

 

                               PHYSICAL EXAMINATION

 

VITAL SIGNS:  The patient's temperature is 98.0, blood pressure is 110/57, 
pulse 74, respirations 22.

 

HEENT:  His extraocular movements were intact.  Tongue is midline.

 

NECK:  Supple.

 

LUNGS:  Sound clear to auscultation bilaterally.

 

HEART:  Sounds were regular.  S1 and S2 were audible.

 

ABDOMEN:  Soft and nontender.

 

EXTREMITIES:  His left arm and leg have somewhat decreased tone.  Peripheral 
pulses were intact.

 

NEUROLOGIC:  The patient is awake, alert, oriented.  I did not detect a facial 
droop.  Muscle strength is about 4/5 on the left side.  He has decreased 
sensation on the left side as well.

 

FUNCTIONAL EXAM:  He transfers with max assist.

 

 ASSESSMENT:  Right pontine cerebrovascular accident with left-sided weakness.

 

PLAN:  Our plan is to integrate him into a comprehensive and therapeutic rehab 
program with the following goals:

 

1.  Physical Therapy will work with the patient.  They are going to work on 
functional transfer training, ambulation training with a walker.

2.  Occupational Therapy will see the patient, work on his activities of daily 
living including toileting and toilet transfers.

3.  Speech Therapy will see the patient.  Right now, we are going to continue 
him on a regular diet with nectar thick liquids.

4.  We are going to continue his Cipro his urinary tract infection.

5.  The patient was on subcu heparin for DVT prophylaxis.  He is having 
bleeding from his meatus because of his catheter.  We are going to have to hold 
the heparin for now and use DIXIE stockings.

6.  For his diabetes mellitus, we will continue fingersticks 4 times a day with 
sliding scale insulin coverage.

7.  Aspirin plus Plavix for secondary stroke prevention.

8.  Continue Lipitor.

9.  SSRIs including Prozac as indicated.

10.   will be closely involved to make sure that any services 
and equipment the patient requires are in place prior to discharge.

11.  Family training as appropriate.

12.  Home with appropriate services.

 

ESTIMATED LENGTH OF STAY:  3 weeks.

 

 

 

[id number]/[facility] #: [id number]

ALISA

## 2018-06-27 NOTE — PN
Progress Note


Date of Service: 06/27/18


Note: 


CAROLYN DEGROOT was visited. Therapy notes read and reviewed. Sugars are fairly 

well controlled. He is impaired but also seems quite down. Will add Prozac 

starting tomorrow. I spoke with his urologist, Dr. Bacon. May need to 

advance sandoval. 








Current Medications: 


 Active Medications











Generic Name Dose Route Start Last Admin





  Trade Name Freq  PRN Reason Stop Dose Admin


 


Aspirin  325 mg  06/27/18 09:00  06/27/18 08:47





  Ecotrin Ec Tab*  PO   325 mg





  DAILY CELESTE   Administration





     





     





     





     


 


Atorvastatin Calcium  20 mg  06/26/18 17:00  06/27/18 16:59





  Lipitor*  PO   20 mg





  1700 CELESTE   Administration





     





     





     





     


 


Ciprofloxacin  500 mg  06/26/18 21:00  06/27/18 08:47





  Cipro Tab*  PO   500 mg





  Q12HR CELESTE   Administration





     





     





     





     


 


Clopidogrel Bisulfate  75 mg  06/27/18 09:00  06/27/18 08:47





  Plavix Tab*  PO   75 mg





  DAILY CELESTE   Administration





     





     





     





     


 


Dextrose  12.5 gm  06/26/18 16:10  





  D50w Syringe 50 Ml*  IV PUSH   





  .FOR FS < 60 - SS PRN   





  FS < 60   





     





     





     


 


Docusate Sodium  100 mg  06/26/18 21:00  06/27/18 08:48





  Colace Cap*  PO   100 mg





  BID CELESTE   Administration





     





     





     





     


 


Fluoxetine HCl  10 mg  06/28/18 09:00  





  Prozac Cap*  PO   





  DAILY CELESTE   





     





     





     





     


 


Insulin Human Lispro  0 - 10 units  06/26/18 16:30  06/27/18 17:00





  Humalog*  SUBCUT   8 units





  ACHS CELESTE   Administration





     





     





  Protocol   





     


 


Magnesium Hydroxide  30 ml  06/26/18 15:53  





  Milk Of Magnesia Liq*  PO   





  Q6H PRN   





  CONSTIPATION   





     





     





     


 


Senna  2 tab  06/26/18 15:53  





  Senokot Tab*  PO   





  BEDTIME PRN   





  CONSTIPATION   





     





     





     











Vital Signs: 


 Vital Signs











Temp Pulse Resp BP Pulse Ox


 


 98.6 F   74   22   128/55   98 


 


 06/27/18 15:50  06/27/18 15:50  06/27/18 15:50  06/27/18 15:50  06/27/18 17:42











Lab Results: 


 Laboratory Results - last 24 hr











  06/26/18 06/27/18 06/27/18





  20:26 05:19 05:19


 


WBC   9.2 


 


RBC   3.97 L 


 


Hgb   11.6 L 


 


Hct   34 L 


 


MCV   85 


 


MCH   29 


 


MCHC   34 


 


RDW   14 


 


Plt Count   264 


 


MPV   7.9 


 


Neut % (Auto)   72.0 


 


Lymph % (Auto)   13.2 L 


 


Mono % (Auto)   9.7 H 


 


Eos % (Auto)   4.5 


 


Baso % (Auto)   0.6 


 


Absolute Neuts (auto)   6.6 


 


Absolute Lymphs (auto)   1.2 


 


Absolute Monos (auto)   0.9 H 


 


Absolute Eos (auto)   0.4 


 


Absolute Basos (auto)   0.1 


 


Absolute Nucleated RBC   0 


 


Nucleated RBC %   0 


 


Sodium    134 L


 


Potassium    4.2


 


Chloride    103


 


Carbon Dioxide    25


 


Anion Gap    6


 


BUN    21


 


Creatinine    1.11


 


Est GFR ( Amer)    76.2


 


Est GFR (Non-Af Amer)    63.0


 


BUN/Creatinine Ratio    18.9


 


Glucose    146 H


 


POC Glucose (mg/dL)  256 H  


 


Calcium    8.6


 


Total Bilirubin    0.40


 


AST    19


 


ALT    12


 


Alkaline Phosphatase    57


 


Total Protein    6.0 L


 


Albumin    2.5 L


 


Globulin    3.5


 


Albumin/Globulin Ratio    0.7 L














  06/27/18 06/27/18 06/27/18





  07:43 11:30 16:35


 


WBC   


 


RBC   


 


Hgb   


 


Hct   


 


MCV   


 


MCH   


 


MCHC   


 


RDW   


 


Plt Count   


 


MPV   


 


Neut % (Auto)   


 


Lymph % (Auto)   


 


Mono % (Auto)   


 


Eos % (Auto)   


 


Baso % (Auto)   


 


Absolute Neuts (auto)   


 


Absolute Lymphs (auto)   


 


Absolute Monos (auto)   


 


Absolute Eos (auto)   


 


Absolute Basos (auto)   


 


Absolute Nucleated RBC   


 


Nucleated RBC %   


 


Sodium   


 


Potassium   


 


Chloride   


 


Carbon Dioxide   


 


Anion Gap   


 


BUN   


 


Creatinine   


 


Est GFR ( Amer)   


 


Est GFR (Non-Af Amer)   


 


BUN/Creatinine Ratio   


 


Glucose   


 


POC Glucose (mg/dL)  157 H  185 H  310 H


 


Calcium   


 


Total Bilirubin   


 


AST   


 


ALT   


 


Alkaline Phosphatase   


 


Total Protein   


 


Albumin   


 


Globulin   


 


Albumin/Globulin Ratio   











Exam: 





HEENT: left facial droop


LUNGS: Clear


HEART: reg rhythm


ABDOMEN: Soft, +BS


EXTREMITIES: Decreased tone on left


: sandoval in place, some bleeding around sandoval


NEUROLOGIC: Alert and oriented. Muscle strength on left 2/5 


Assessment/Plan: 





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 PT/OT/SLP


2. Metastatic Prostate Cancer: Sandoval in place


3. Depression: Try Prozac


4. DM: BS are acceptable


5. DVT Prophylaxis: Heparin held due to penile bleeding


6. Advanced Directives: Full code


7. Dysphagia: regular textures, nectar thick liquids











06/27/18 18:59

## 2018-06-28 RX ADMIN — DOCUSATE SODIUM SCH MG: 100 CAPSULE, LIQUID FILLED ORAL at 20:37

## 2018-06-28 RX ADMIN — ATORVASTATIN CALCIUM SCH MG: 20 TABLET, FILM COATED ORAL at 17:13

## 2018-06-28 RX ADMIN — CIPROFLOXACIN SCH MG: 500 TABLET, FILM COATED ORAL at 09:36

## 2018-06-28 RX ADMIN — FLUOXETINE SCH MG: 10 CAPSULE ORAL at 09:36

## 2018-06-28 RX ADMIN — CLOPIDOGREL SCH MG: 75 TABLET, FILM COATED ORAL at 09:36

## 2018-06-28 RX ADMIN — INSULIN LISPRO SCH UNITS: 100 INJECTION, SOLUTION INTRAVENOUS; SUBCUTANEOUS at 12:37

## 2018-06-28 RX ADMIN — DOCUSATE SODIUM SCH MG: 100 CAPSULE, LIQUID FILLED ORAL at 09:36

## 2018-06-28 RX ADMIN — CIPROFLOXACIN SCH MG: 500 TABLET, FILM COATED ORAL at 20:37

## 2018-06-28 RX ADMIN — INSULIN LISPRO SCH UNITS: 100 INJECTION, SOLUTION INTRAVENOUS; SUBCUTANEOUS at 09:31

## 2018-06-28 RX ADMIN — ASPIRIN SCH MG: 325 TABLET, COATED ORAL at 09:36

## 2018-06-28 RX ADMIN — INSULIN LISPRO SCH UNITS: 100 INJECTION, SOLUTION INTRAVENOUS; SUBCUTANEOUS at 20:45

## 2018-06-28 RX ADMIN — INSULIN LISPRO SCH UNITS: 100 INJECTION, SOLUTION INTRAVENOUS; SUBCUTANEOUS at 17:13

## 2018-06-28 NOTE — PN
Progress Note


Date of Service: 06/28/18


Note: 


CAROLYN DEGROOT was visited. Therapy notes read and reviewed. He was discussed in 

interdisciplinary team rounds. He seems depressed, but he's improving. Have 

started Prozac








Current Medications: 


 Active Medications











Generic Name Dose Route Start Last Admin





  Trade Name Freq  PRN Reason Stop Dose Admin


 


Aspirin  325 mg  06/27/18 09:00  06/28/18 09:36





  Ecotrin Ec Tab*  PO   325 mg





  DAILY CELESTE   Administration





     





     





     





     


 


Atorvastatin Calcium  20 mg  06/26/18 17:00  06/28/18 17:13





  Lipitor*  PO   20 mg





  1700 CELESTE   Administration





     





     





     





     


 


Ciprofloxacin  500 mg  06/26/18 21:00  06/28/18 20:37





  Cipro Tab*  PO   500 mg





  Q12HR CELESTE   Administration





     





     





     





     


 


Clopidogrel Bisulfate  75 mg  06/27/18 09:00  06/28/18 09:36





  Plavix Tab*  PO   75 mg





  DAILY CELESTE   Administration





     





     





     





     


 


Dextrose  12.5 gm  06/26/18 16:10  





  D50w Syringe 50 Ml*  IV PUSH   





  .FOR FS < 60 - SS PRN   





  FS < 60   





     





     





     


 


Docusate Sodium  100 mg  06/26/18 21:00  06/28/18 20:37





  Colace Cap*  PO   100 mg





  BID CELESTE   Administration





     





     





     





     


 


Fluoxetine HCl  10 mg  06/28/18 09:00  06/28/18 09:36





  Prozac Cap*  PO   10 mg





  DAILY CELESTE   Administration





     





     





     





     


 


Insulin Human Lispro  0 - 10 units  06/26/18 16:30  06/28/18 20:45





  Humalog*  SUBCUT   8 units





  ACHS CELESTE   Administration





     





     





  Protocol   





     


 


Magnesium Hydroxide  30 ml  06/26/18 15:53  





  Milk Of Magnesia Liq*  PO   





  Q6H PRN   





  CONSTIPATION   





     





     





     


 


Senna  2 tab  06/26/18 15:53  





  Senokot Tab*  PO   





  BEDTIME PRN   





  CONSTIPATION   





     





     





     











Vital Signs: 


 Vital Signs











Temp Pulse Resp BP Pulse Ox


 


 98.2 F   85   24   120/58   94 


 


 06/28/18 15:40  06/28/18 15:40  06/28/18 15:40  06/28/18 15:40  06/28/18 18:41











Lab Results: 


 Laboratory Results - last 24 hr











  06/28/18 06/28/18 06/28/18





  08:13 12:03 16:30


 


POC Glucose (mg/dL)  145 H  203 H  295 H














  06/28/18





  20:30


 


POC Glucose (mg/dL)  320 H











Exam: 





HEENT: left facial droop


LUNGS: Clear


HEART: reg rhythm


ABDOMEN: Soft, +BS


EXTREMITIES: Decreased tone on left


: sandoval in place, less bleeding around sandoval


NEUROLOGIC: Alert and oriented. Muscle strength on left 2/5 


Assessment/Plan: 





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 PT/OT/SLP


2. Metastatic Prostate Cancer: Sandoval in place. Dr. Bacon advised advancing 

sandoval to decrease bleeding


3. Depression: Try Prozac


4. DM: BS are slightly high. Continue SSI


5. DVT Prophylaxis: Heparin held due to penile bleeding


6. Advanced Directives: Full code


7. Dysphagia: regular textures, nectar thick liquids


8. Urinary Tract Infection: Cipro day 6/7 06/28/18 21:31

## 2018-06-28 NOTE — PMRUTEAM
PMRU: Team Meeting


Current Status: 


 Nursing: Current Status











Skin Deviations [Bilateral     Other





Foot]                          


 


Skin Deviation Description [   dry skin





Bilateral Foot]                











 Physical Therapy: Current Status











Bed Mobility Assistance        Mod Assist


 


Transfer Moblility Assistance  Max Assist,2 or More Person Assist


 


Transfer/Bed Mobility          None





Recommended Devices            


 


Ambulation Assistance          Unable


 


Ambulation Assistive Devices   Rolling Walker


 


Stairs Assistance              Min Assist


 


Stairs Recommended Devices     Two Rails


 


Number of Stairs               8














 Occupational Therapy: Current Status











Upper Body Dressing            Total Assist


 


Lower Body Dressing            Total Assist,2 Person Assist


 


Bathing                        Max Asst,2 Person Assist


 


Toileting                      Total Assist,2 Person Assist


 


Toilet Transfer                Min Assist,Max Asst,2 Person Assist


 


Eating                         Supervision














 Rec Therapy: Current Status











Summary of Assessment and      Pt. recently admitted to the unit, open to





Clinical Impression            conversation.  Pt. took some time describing his





 interests and involvement.  Pt. was pleasant and





 cooperative, became tearful when talking about his





 significant other who passed away in 2014.  Pt.





 had a radio from the 4th floor, replace it with a





 [facility] radio, pt. loves listening to the news.  Pt.





 was open to continued leisure visits.


 


Treatment Goals                Pt. will engage in leisure activities while on 

the





 unit.


 


Treatment Plan                 Provide RT services and encourage involvement.














 Social Work: Current Status











Discharge Plan                 TBD, pt is hoping to move in with his son.


 


Potential for Family Training  TBD


 


Anticipated Discharge          Home





Destination                    


 


Discharge With                 home care svs and family support














 Speech: Current Status











Assessment                     Patient is progressing as expected


 


Speech Current Status Goal 1   Mild-moderate dysphagia














Goals: 


 Physical Therapy: Updated Goals











Transfer/Bed Mobility          Hand Hold





Recommended Devices            














 Occupational Therapy: Initial Goals











Goals to be Completed in (Days 21-28





)                              


 


Upper Body Bathing Routine     Modified Independent with


 


Lower Body Bathing Routine     Modified Independent with


 


Upper Body Dressing Routine    Modified Independent with


 


Lower Body Dressing Routine    Modified Independent with


 


Toilet Hygeine and Clothing    Modified Independent with





Management Routine             


 


Toilet Transfer Routine        Modified Independent with


 


Tub Transfer Routine           Modified Independent with


 


Functional Transfers for ADL   Modified Independent with


 


Grooming Routine               Independent


 


Feeding Routine                Modified Independent with


 


Light Housekeeping Tasks       Moderate Assist














 Nutrition: Goals











Intervention Goals             1. Pt will tolerate least-restrictive diet 

texture





 without evidence difficulty chewing/swallowing





 2. Intake will support stable wt without further





 unintended wt loss





 3. BG will be adequately controlled per inpt





 parameters











 Speech: Goals











Speech Goal 1                  Swallowing


 


Speech Evaluation Status Goal  Mild-moderate dysphagia





1                              


 


Speech Current Status Goal 1   Mild-moderate dysphagia


 


Goal 1 Comments                Long-Term Goal: Patient will tolerate least





 restrictive diet consistencies w/ no clinical s/s





 aspiration





 Short Term Goals:





 1)  STG: Pt will tolerate nectar thick liquids w/





 no clinical s/s aspiration.





 Status:  progressing as expected





 For initial trials in upright position, patient





 demonstrated mild throat clearing. SLP provided





 skilled instruction in compensatory swallow





 techniques: chin tuck, supraglottic swallow; and





 patient tolerated 8/8 trials w/o clinicla s/s





 aspiration.





 STG: Pt will tolerate thin liquids w/ no clinical





 s/s aspiration.





 Status:  progressing as expected





 SLP directed patient to continue to use





 compensatry swallow techniques. Patient





 demonstated clinical s/s aspiration after 4/7





 trials of thin water. SLP modified education to





 provide maximum cueing then faded cueing to





 minimal.





 SLP provided skilled instruction in swallowing





 exercise:  Mendelsohn maneuver and hard-fast





 swallows. Patient demonstrated partial compliance





 woth maximum cueing. Requires further instructon.














 Social Work: Goals











Discharge Plan                 TBD, pt is hoping to move in with his son.


 


Potential for Family Training  TBD


 


Anticipated Discharge          Home





Destination                    


 


Discharge With                 home care svs and family support

















Care Plan: 


 Care Plan





ADL's - Improve/Maintain                Start:  06/27/18 14:48              


Freq:   DAILY                           Status: Active      Target:         


Protocol:                                                                   








Activity Type Activity Date Activity User E-Sign Co-Sign Detail





Recorded Client Recorded Date Recorded By   


 


Document 06/28/18 11:44 [id number]   





[id number]-C09 06/28/18 11:44 [id number]   














  06/28/18





  11:44


 


PMRU Outcome: ADL's/ADL Transfers 


 


Orders/Interventions Occupational





 Therapy





 Evaluation &





 Treatment





 Communication





 Tool in Patient





 Room


 


Device Yes


 


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


 


Patient to receive OT 5x/wk for  Therex





min/day Self Care





 Management





 Group Therapy





 Neuromuscular





 ReEducation


 


UE/LE ADL's with Assist Yes: Raymond


 


ADL Transfers with Assist Yes: Raymond


 


Toileting: Transfers,Clothing Management Yes: Raymond





,Hygeine w/Assist 


 


Light Kitchen/Laundry w/Assist Yes: modA


 


Progression Toward Outcome/Goals Progressing


 


Outcome/Goals Met Pt able to doff





 shirt with





 encouragement





 and Ganesh this





 date. SW to get





 in touch with





 pt's son to get





 more clothes





 for pt as pt





 had to use gown





 and hospital





 pants today. Pt





 continues with





 flat affect





 and requires





 cues for





 encouragement





 to attempt





 tasks as able





 during ADL





 routine.








Coping/Psych-Improve/Maintain           Start:  06/26/18 16:22              


Freq:   QSHIFT                          Status: Active      Target:         


Protocol:                                                                   








Activity Type Activity Date Activity User E-Sign Co-Sign Detail





Recorded Client Recorded Date Recorded By   


 


Document 06/28/18 01:31 [id number]   





PMRU-C03 06/28/18 01:33 [id number]   














  06/28/18





  01:31


 


PMRU Outcome: Coping/Psychosocial 


 


Coping Outcome/Goals Verbalization





 of Acceptance





 of Rehab Admit





 Willingness to





 Participate in





 Treatment Plan





 and Basic Needs


 


Psychosocial Outcome/Goals Maintain/





 Improve





 Emotional





 Health





 Cooperate/





 Participate in





 Plan


 


Progression Toward Outcome/Goals - Progressing





Coping 


 


Progression Toward Outcome/Goals - Progressing





Psychosocial 








DVT Prophylaxis- Improve/Maintain       Start:  06/26/18 16:22              


Freq:   QSHIFT                          Status: Active      Target:         


Protocol:                                                                   








Activity Type Activity Date Activity User E-Sign Co-Sign Detail





Recorded Client Recorded Date Recorded By   


 


Document 06/28/18 01:31 [id number]   





PMRU-C03 06/28/18 01:33 [id number]   














  06/28/18





  01:31


 


PMRU Outcome: DVT Prophylaxis 


 


Outcome/Goals Remains Free of





 DVT





 Complies with





 DVT Prophylaxis





 /Treatment





 Demonstrates





 Knowledge of





 DVT Prevention/





 Treatment





 TEDS Stockings





 on Every AM,





 Off at HS


 


Progression Toward Outcome/Goals Progressing








Discharge Planning - Improve/Maintain   Start:  06/26/18 16:22              


Freq:   DAILY                           Status: Active      Target:         


Protocol:                                                                   








Activity Type Activity Date Activity User E-Sign Co-Sign Detail





Recorded Client Recorded Date Recorded By   


 


Document 06/28/18 01:31 [id number]   





PMRU-C03 06/28/18 01:33 [id number]   














  06/28/18





  01:31


 


PMRU Outcome: Discharge Planning 


 


Outcome/Goals Demonstrates





 Understanding





 of Discharge





 Plan








Education-Improve/Maintain              Start:  06/26/18 16:22              


Freq:   QSHIFT                          Status: Active      Target:         


Protocol:                                                                   








Activity Type Activity Date Activity User E-Sign Co-Sign Detail





Recorded Client Recorded Date Recorded By   


 


Document 06/28/18 01:31 [id number]   





PMRU-C03 06/28/18 01:33 [id number]   














  06/28/18





  01:31


 


PMRU Outcome: Education 


 


Outcome/Goals Encourage





 Questions


 


Progression Toward Outcome/Goals Progressing








/GI-Improve/Maintain                  Start:  06/26/18 16:22              


Freq:   QSHIFT                          Status: Active      Target:         


Protocol:                                                                   








Activity Type Activity Date Activity User E-Sign Co-Sign Detail





Recorded Client Recorded Date Recorded By   


 


Document 06/28/18 01:31 [id number]   





PMRU-C03 06/28/18 01:33 [id number]   














  06/28/18





  01:31


 


PMRU Outcome: Genitourinary/ 





Gastrointestinal 


 


Genitourinary- Outcome/Goals Remain Free of





 Hospital-





 Acquired UTI


 


Gastrointestinal-Outcome/Goals Prevent





 Constipation





 Laxatives as





 Ordered


 


Other Outcome/Goals has chronic





 sandoval d/t





 prostate CA.


 


Progression Toward Outcome/Goals -  Progressing


 


Progression Toward Outcome/Goals - GI Progressing








Medication Administration               Start:  06/26/18 16:22              


Freq:   QSHIFT                          Status: Active      Target:         


Protocol:                                                                   








Activity Type Activity Date Activity User E-Sign Co-Sign Detail





Recorded Client Recorded Date Recorded By   


 


Document 06/28/18 01:31 [id number]   





PMRU-C03 06/28/18 01:33 [id number]   














  06/28/18





  01:31


 


PMRU Outcome: Medication Administration 


 


Assess Patient Knowledge/Teach Med Yes





Education for all Meds 


 


Outcome/Goals Patient





 Independent





 with Medication





 Administration





 at Home





 Demonstrates





 Understanding


 


Progression Towards Outcome/Goals Progressing


 


Is Patient Going Home on Lovenox? No








Metabolic Status- Improve/Maintain      Start:  06/26/18 16:22              


Freq:   QSHIFT                          Status: Active      Target:         


Protocol:                                                                   








Activity Type Activity Date Activity User E-Sign Co-Sign Detail





Recorded Client Recorded Date Recorded By   


 


Document 06/28/18 01:31 [id number]   





PMRU-C03 06/28/18 01:33 [id number]   














  06/28/18





  01:31


 


PMRU Outcome: Metabolic Status 


 


Have Fingersticks Been Ordered Yes


 


Fingerstick Order Frequency AC & HS


 


Outcome/Goals Maintain/





 Improve





 Metabolic





 Status





 Demonstrate





 Knowledge of





 Prevention/





 Treatment of





 Metabolic





 Imbalances


 


Progression Toward Outcome/Goals Progressing








Neurological- Improve/Maintain          Start:  06/26/18 16:22              


Freq:   QSHIFT                          Status: Active      Target:         


Protocol:                                                                   








Activity Type Activity Date Activity User E-Sign Co-Sign Detail





Recorded Client Recorded Date Recorded By   


 


Document 06/28/18 01:31 [id number]   





PMRU-C03 06/28/18 01:33 [id number]   














  06/28/18





  01:31


 


PMRU Outcome: Neurological 


 


Weakness/Aphasia Weakness





 Left Side


 


Outcome/Goals Maintain/





 Achieve





 Baseline





 Neurological





 Status





 Improve





 Neurological





 Status





 Prevent





 Avoidable





 Neurological





 Decline





 Maintain/





 Improve





 Strength/ROM


 


Progression Toward Outcome/Goals Progressing








Nutrition-Improve/Maintain              Start:  06/26/18 18:14              


Freq:   DAILY@0400,1600                 Status: Active      Target:         


Protocol:                                                                   








Activity Type Activity Date Activity User E-Sign Co-Sign Detail





Recorded Client Recorded Date Recorded By   


 


Document 06/28/18 01:31 [id number]   





PMRU-C03 06/28/18 01:33 [id number]   














  06/28/18





  01:31


 


Outcome: Nutrition 


 


Outcome/Goals Demonstrates





 Adequate





 Hydration





 Maintain/





 Improve





 Nutritional





 Status


 


Outcome/Goals Met Comment Pt needs to





 drink more








Safety- Improve/Maintain                Start:  06/26/18 16:22              


Freq:   QSHIFT                          Status: Active      Target:         


Protocol:                                                                   








Activity Type Activity Date Activity User E-Sign Co-Sign Detail





Recorded Client Recorded Date Recorded By   


 


Document 06/28/18 01:31 [id number]   





PMRU-C03 06/28/18 01:33 [id number]   














  06/28/18





  01:31


 


PMRU Outcome: Safety 


 


Outcome/Goals Remain Free of





 Injury or Harm





 Cooperates with





 Safety





 Measures for





 Least





 Restrictive





 Environment





 Prevent Falls/





 Injury


 


Progression Toward Outcome/Goals Progressing


 


Outcome/Goals Met Comment PA in place








Skin- Improve/Maintain                  Start:  06/26/18 16:22              


Freq:   QSHIFT                          Status: Active      Target:         


Protocol:                                                                   








Activity Type Activity Date Activity User E-Sign Co-Sign Detail





Recorded Client Recorded Date Recorded By   


 


Document 06/28/18 01:31 [id number]   





PMRU-C03 06/28/18 01:33 [id number]   














  06/28/18





  01:31


 


PMRU Outcome: Skin 


 


Skin Risk Level Medium


 


Skin Orders Turn/Position





 q2hr While in





 Bed


 


Outcome/Goals Maintain/





 Improve Skin





 Intergrity





 Free from





 Decubitus


 


Progression Toward Outcome/Goals Progressing














Medicine Note: 








Length of Stay:  4 weeks





Anticipated Discharge Destination: Home





Tentative Discharge Date:  07/26/18





Discharged to:  Home

## 2018-06-29 RX ADMIN — INSULIN LISPRO SCH UNITS: 100 INJECTION, SOLUTION INTRAVENOUS; SUBCUTANEOUS at 17:03

## 2018-06-29 RX ADMIN — CIPROFLOXACIN SCH MG: 500 TABLET, FILM COATED ORAL at 09:16

## 2018-06-29 RX ADMIN — INSULIN LISPRO SCH UNITS: 100 INJECTION, SOLUTION INTRAVENOUS; SUBCUTANEOUS at 12:30

## 2018-06-29 RX ADMIN — INSULIN LISPRO SCH UNITS: 100 INJECTION, SOLUTION INTRAVENOUS; SUBCUTANEOUS at 21:33

## 2018-06-29 RX ADMIN — CIPROFLOXACIN SCH MG: 500 TABLET, FILM COATED ORAL at 19:55

## 2018-06-29 RX ADMIN — SENNOSIDES PRN TAB: 8.6 TABLET, FILM COATED ORAL at 19:55

## 2018-06-29 RX ADMIN — DOCUSATE SODIUM SCH MG: 100 CAPSULE, LIQUID FILLED ORAL at 19:55

## 2018-06-29 RX ADMIN — ASPIRIN SCH MG: 325 TABLET, COATED ORAL at 09:16

## 2018-06-29 RX ADMIN — ATORVASTATIN CALCIUM SCH MG: 20 TABLET, FILM COATED ORAL at 17:03

## 2018-06-29 RX ADMIN — DOCUSATE SODIUM SCH MG: 100 CAPSULE, LIQUID FILLED ORAL at 09:16

## 2018-06-29 RX ADMIN — FLUOXETINE SCH MG: 10 CAPSULE ORAL at 09:16

## 2018-06-29 RX ADMIN — CLOPIDOGREL SCH MG: 75 TABLET, FILM COATED ORAL at 09:16

## 2018-06-29 RX ADMIN — INSULIN LISPRO SCH UNITS: 100 INJECTION, SOLUTION INTRAVENOUS; SUBCUTANEOUS at 09:16

## 2018-06-30 RX ADMIN — POLYETHYLENE GLYCOL 3350 SCH GM: 17 POWDER, FOR SOLUTION ORAL at 12:38

## 2018-06-30 RX ADMIN — FLUOXETINE SCH MG: 10 CAPSULE ORAL at 08:35

## 2018-06-30 RX ADMIN — INSULIN LISPRO SCH UNITS: 100 INJECTION, SOLUTION INTRAVENOUS; SUBCUTANEOUS at 16:54

## 2018-06-30 RX ADMIN — MAGNESIUM HYDROXIDE PRN ML: 400 SUSPENSION ORAL at 12:20

## 2018-06-30 RX ADMIN — INSULIN LISPRO SCH UNITS: 100 INJECTION, SOLUTION INTRAVENOUS; SUBCUTANEOUS at 12:30

## 2018-06-30 RX ADMIN — CLOPIDOGREL SCH MG: 75 TABLET, FILM COATED ORAL at 08:35

## 2018-06-30 RX ADMIN — DOCUSATE SODIUM SCH MG: 100 CAPSULE, LIQUID FILLED ORAL at 20:15

## 2018-06-30 RX ADMIN — BISACODYL PRN MG: 10 SUPPOSITORY RECTAL at 16:54

## 2018-06-30 RX ADMIN — SENNOSIDES PRN TAB: 8.6 TABLET, FILM COATED ORAL at 20:15

## 2018-06-30 RX ADMIN — INSULIN LISPRO SCH UNITS: 100 INJECTION, SOLUTION INTRAVENOUS; SUBCUTANEOUS at 08:34

## 2018-06-30 RX ADMIN — ASPIRIN SCH MG: 325 TABLET, COATED ORAL at 08:35

## 2018-06-30 RX ADMIN — ATORVASTATIN CALCIUM SCH MG: 20 TABLET, FILM COATED ORAL at 16:54

## 2018-06-30 RX ADMIN — INSULIN LISPRO SCH UNITS: 100 INJECTION, SOLUTION INTRAVENOUS; SUBCUTANEOUS at 20:56

## 2018-06-30 RX ADMIN — DOCUSATE SODIUM SCH MG: 100 CAPSULE, LIQUID FILLED ORAL at 08:35

## 2018-06-30 NOTE — PN
Progress Note


Date of Service: 06/30/18


Note: 


CAROLYN DEGROOT was visited. Nursing and therapy notes read and reviewed. No 

chest pain, shortness of breath or abdominal pain.  He notes some slow 

improvements.  No bowel movement in a few days.








Current Medications: 


 Active Medications











Generic Name Dose Route Start Last Admin





  Trade Name Freq  PRN Reason Stop Dose Admin


 


Aspirin  325 mg  06/27/18 09:00  06/30/18 08:35





  Ecotrin Ec Tab*  PO   325 mg





  DAILY CELESTE   Administration





     





     





     





     


 


Atorvastatin Calcium  20 mg  06/26/18 17:00  06/29/18 17:03





  Lipitor*  PO   20 mg





  1700 CELESTE   Administration





     





     





     





     


 


Clopidogrel Bisulfate  75 mg  06/27/18 09:00  06/30/18 08:35





  Plavix Tab*  PO   75 mg





  DAILY CELESTE   Administration





     





     





     





     


 


Dextrose  12.5 gm  06/26/18 16:10  





  D50w Syringe 50 Ml*  IV PUSH   





  .FOR FS < 60 - SS PRN   





  FS < 60   





     





     





     


 


Docusate Sodium  100 mg  06/26/18 21:00  06/30/18 08:35





  Colace Cap*  PO   100 mg





  BID CELESTE   Administration





     





     





     





     


 


Fluoxetine HCl  10 mg  06/28/18 09:00  06/30/18 08:35





  Prozac Cap*  PO   10 mg





  DAILY CELESTE   Administration





     





     





     





     


 


Glyburide  5 mg  07/01/18 09:00  





  Diabeta Tab*  PO   





  DAILY CELESTE   





     





     





     





     


 


Insulin Human Lispro  0 - 10 units  06/26/18 16:30  06/30/18 08:34





  Humalog*  SUBCUT   1 units





  [facility]HS CELESTE   Administration





     





     





  Protocol   





     


 


Magnesium Hydroxide  30 ml  06/26/18 15:53  





  Milk Of Magnesia Liq*  PO   





  Q6H PRN   





  CONSTIPATION   





     





     





     


 


Senna  2 tab  06/26/18 15:53  06/29/18 19:55





  Senokot Tab*  PO   2 tab





  BEDTIME PRN   Administration





  CONSTIPATION   





     





     





     











Vital Signs: 


 Vital Signs











Temp Pulse Resp BP Pulse Ox


 


 97.8 F   71   18   140/64   94 


 


 06/30/18 05:06  06/30/18 05:06  06/30/18 05:06  06/30/18 05:06  06/30/18 09:28











Lab Results: 


 Laboratory Results - last 24 hr











  06/29/18 06/29/18 06/29/18





  12:09 16:31 20:29


 


POC Glucose (mg/dL)  213 H  296 H  290 H














  06/30/18 06/30/18





  07:32 11:44


 


POC Glucose (mg/dL)  141 H  233 H











Exam: 





GEN: no acute distress. alert and appropriate.


LUNGS: clear bilaterally.


CV: regular rate and rhythm


ABD: + bowel sounds, soft, non-tender, non-distended.


EXT: no edema.


NEURO: left fascial droop and tongue deviates left.  Left motor shows deltoid 4

, biceps 4, triceps 3-4, ECR 4, finger abd 2, hip flex 2, knee ext 2, DF 0. 

Sensation impaired on left.


: some dried blood at penile meatus but urine in sandoval looks clear.








Assessment/Plan: 


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 PT/OT/SLP


2. Metastatic Prostate Cancer: Sandoval in place. Per Dr. Bacon sandoval was 

advanced to decrease bleeding. follow clinically.


3. Depression: On Prozac


4. DM: BS are slightly high. Restart glyburide 5mg qday (home dose is 5mg bid). 

Continue SSI


5. DVT Prophylaxis: Heparin held due to penile bleeding


6. Advanced Directives: Full code


7. Dysphagia: regular textures, nectar thick liquids


8. Urinary Tract Infection: Completed 7 days of Cipro.


9. Constipation: add daily miralax.  dulcolax prn.


10. Estimated LOS: anticipate d/c 7/26.








06/30/18 12:25

## 2018-07-01 RX ADMIN — FLUOXETINE SCH MG: 10 CAPSULE ORAL at 09:18

## 2018-07-01 RX ADMIN — GLYBURIDE SCH MG: 5 TABLET ORAL at 20:50

## 2018-07-01 RX ADMIN — DOCUSATE SODIUM SCH: 100 CAPSULE, LIQUID FILLED ORAL at 20:55

## 2018-07-01 RX ADMIN — INSULIN LISPRO SCH UNITS: 100 INJECTION, SOLUTION INTRAVENOUS; SUBCUTANEOUS at 08:47

## 2018-07-01 RX ADMIN — INSULIN LISPRO SCH UNITS: 100 INJECTION, SOLUTION INTRAVENOUS; SUBCUTANEOUS at 12:35

## 2018-07-01 RX ADMIN — INSULIN LISPRO SCH UNITS: 100 INJECTION, SOLUTION INTRAVENOUS; SUBCUTANEOUS at 17:28

## 2018-07-01 RX ADMIN — ATORVASTATIN CALCIUM SCH MG: 20 TABLET, FILM COATED ORAL at 17:29

## 2018-07-01 RX ADMIN — INSULIN LISPRO SCH UNITS: 100 INJECTION, SOLUTION INTRAVENOUS; SUBCUTANEOUS at 20:51

## 2018-07-01 RX ADMIN — ASPIRIN SCH MG: 325 TABLET, COATED ORAL at 09:17

## 2018-07-01 RX ADMIN — POLYETHYLENE GLYCOL 3350 SCH: 17 POWDER, FOR SOLUTION ORAL at 09:18

## 2018-07-01 RX ADMIN — DOCUSATE SODIUM SCH: 100 CAPSULE, LIQUID FILLED ORAL at 09:18

## 2018-07-01 RX ADMIN — CLOPIDOGREL SCH MG: 75 TABLET, FILM COATED ORAL at 09:17

## 2018-07-01 NOTE — PN
Progress Note


Date of Service: 07/01/18


Note: 


CAROLYN DEGROOT was visited. Nursing and therapy notes read and reviewed. No 

chest pain, shortness of breath or abdominal pain.  He is no longer 

constipated. No chest pain, shortness of breath or abdominal pain.  He is still 

having penile bleeding and passing some stringy clots.  








Current Medications: 


 Active Medications











Generic Name Dose Route Start Last Admin





  Trade Name Freq  PRN Reason Stop Dose Admin


 


Aspirin  325 mg  06/27/18 09:00  07/01/18 09:17





  Ecotrin Ec Tab*  PO   325 mg





  DAILY CELESTE   Administration





     





     





     





     


 


Atorvastatin Calcium  20 mg  06/26/18 17:00  06/30/18 16:54





  Lipitor*  PO   20 mg





  1700 CELESTE   Administration





     





     





     





     


 


Bisacodyl  10 mg  06/30/18 12:18  06/30/18 16:54





  Dulcolax Supp*  TX   10 mg





  DAILY PRN   Administration





  CONSTIPATION   





     





     





     


 


Clopidogrel Bisulfate  75 mg  06/27/18 09:00  07/01/18 09:17





  Plavix Tab*  PO   75 mg





  DAILY CELESTE   Administration





     





     





     





     


 


Dextrose  12.5 gm  06/26/18 16:10  





  D50w Syringe 50 Ml*  IV PUSH   





  .FOR FS < 60 - SS PRN   





  FS < 60   





     





     





     


 


Docusate Sodium  100 mg  06/26/18 21:00  07/01/18 09:18





  Colace Cap*  PO   Not Given





  BID CELESTE   





     





     





     





     


 


Fluoxetine HCl  10 mg  06/28/18 09:00  07/01/18 09:18





  Prozac Cap*  PO   10 mg





  DAILY CELESTE   Administration





     





     





     





     


 


Glyburide  5 mg  07/01/18 09:00  07/01/18 09:18





  Diabeta Tab*  PO   5 mg





  DAILY CELESTE   Administration





     





     





     





     


 


Insulin Human Lispro  0 - 10 units  06/26/18 16:30  07/01/18 08:47





  Humalog*  SUBCUT   4 units





  [facility]HS CELESTE   Administration





     





     





  Protocol   





     


 


Magnesium Hydroxide  30 ml  06/26/18 15:53  06/30/18 12:20





  Milk Of Magnesia Liq*  PO   30 ml





  Q6H PRN   Administration





  CONSTIPATION   





     





     





     


 


Polyethylene Glycol/Electrolytes  17 gm  06/30/18 13:00  07/01/18 09:18





  Miralax*  PO   Not Given





  DAILY CELESTE   





     





     





     





     


 


Senna  2 tab  06/26/18 15:53  06/30/18 20:15





  Senokot Tab*  PO   2 tab





  BEDTIME PRN   Administration





  CONSTIPATION   





     





     





     











Vital Signs: 


 Vital Signs











Temp Pulse Resp BP Pulse Ox


 


 98.4 F   75   17   127/63   94 


 


 07/01/18 05:55  07/01/18 05:55  07/01/18 05:55  07/01/18 05:55  07/01/18 05:55











Lab Results: 


 Laboratory Results - last 24 hr











  06/30/18 06/30/18 06/30/18





  11:44 16:24 20:15


 


POC Glucose (mg/dL)  233 H  218 H  272 H














  07/01/18





  07:23


 


POC Glucose (mg/dL)  221 H











Exam: 





GEN: no acute distress. alert and appropriate.


LUNGS: clear bilaterally.


CV: regular rate and rhythm


ABD: + bowel sounds, soft, non-tender, non-distended.


EXT: no edema.


NEURO: left fascial droop and tongue deviates left.  Left motor shows deltoid 4

, biceps 4, triceps 3-4, ECR 4, finger abd 2, hip flex 1, knee ext 2-3, DF 2. 

Sensation impaired on left.


: blood at penile meatus but urine in sandoval looks clear.








Assessment/Plan: 


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 PT/OT/SLP


2. Metastatic Prostate Cancer: Sandoval in place. Per Dr. Bacon sandoval was 

advanced to decrease bleeding. bleeding continues, but needs ASA and plavix. 

Recheck CBC.


3. Depression: On Prozac


4. DM: BS are slightly high. Increase to home dose glyburide 5mg bid. Continue 

SSI


5. DVT Prophylaxis: Heparin held due to penile bleeding


6. Advanced Directives: Full code


7. Dysphagia: regular textures, nectar thick liquids


8. Urinary Tract Infection: Completed 7 days of Cipro.


9. Constipation: daily miralax.  


10. Estimated LOS: anticipate d/c 7/26.








07/01/18 11:29

## 2018-07-02 LAB
BASOPHILS # BLD AUTO: 0.1 10^3/UL (ref 0–0.2)
EOSINOPHIL # BLD AUTO: 0.2 10^3/UL (ref 0–0.6)
HCT VFR BLD AUTO: 34 % (ref 42–52)
HGB BLD-MCNC: 11.3 G/DL (ref 14–18)
LYMPHOCYTES # BLD AUTO: 0.9 10^3/UL (ref 1–4.8)
MCH RBC QN AUTO: 29 PG (ref 27–31)
MCHC RBC AUTO-ENTMCNC: 34 G/DL (ref 31–36)
MCV RBC AUTO: 85 FL (ref 80–94)
MONOCYTES # BLD AUTO: 1 10^3/UL (ref 0–0.8)
NEUTROPHILS # BLD AUTO: 7.7 10^3/UL (ref 1.5–7.7)
NRBC # BLD AUTO: 0 10^3/UL
NRBC BLD QL AUTO: 0
PLATELET # BLD AUTO: 314 10^3/UL (ref 150–450)
RBC # BLD AUTO: 3.92 10^6/UL (ref 4–5.4)
WBC # BLD AUTO: 9.9 10^3/UL (ref 3.5–10.8)

## 2018-07-02 RX ADMIN — INSULIN LISPRO SCH UNITS: 100 INJECTION, SOLUTION INTRAVENOUS; SUBCUTANEOUS at 17:27

## 2018-07-02 RX ADMIN — GLYBURIDE SCH MG: 5 TABLET ORAL at 20:53

## 2018-07-02 RX ADMIN — GLYBURIDE SCH MG: 5 TABLET ORAL at 08:44

## 2018-07-02 RX ADMIN — DOCUSATE SODIUM SCH MG: 100 CAPSULE, LIQUID FILLED ORAL at 08:43

## 2018-07-02 RX ADMIN — ATORVASTATIN CALCIUM SCH MG: 20 TABLET, FILM COATED ORAL at 17:27

## 2018-07-02 RX ADMIN — INSULIN LISPRO SCH UNITS: 100 INJECTION, SOLUTION INTRAVENOUS; SUBCUTANEOUS at 12:23

## 2018-07-02 RX ADMIN — ASPIRIN SCH MG: 325 TABLET, COATED ORAL at 08:44

## 2018-07-02 RX ADMIN — FLUOXETINE SCH MG: 10 CAPSULE ORAL at 08:44

## 2018-07-02 RX ADMIN — CLOPIDOGREL SCH MG: 75 TABLET, FILM COATED ORAL at 08:43

## 2018-07-02 RX ADMIN — POLYETHYLENE GLYCOL 3350 SCH GM: 17 POWDER, FOR SOLUTION ORAL at 08:43

## 2018-07-02 RX ADMIN — DOCUSATE SODIUM SCH MG: 100 CAPSULE, LIQUID FILLED ORAL at 20:52

## 2018-07-02 RX ADMIN — INSULIN LISPRO SCH UNITS: 100 INJECTION, SOLUTION INTRAVENOUS; SUBCUTANEOUS at 20:52

## 2018-07-02 RX ADMIN — INSULIN LISPRO SCH: 100 INJECTION, SOLUTION INTRAVENOUS; SUBCUTANEOUS at 07:46

## 2018-07-02 NOTE — PN
Progress Note


Date of Service: 07/02/18


Note: 


CAROLYN DEGROOT was visited. Nursing notes read and reviewed. Per speech he can 

do ice chips between meals now. No chest pain, shortness of breath or abdominal 

pain.  Intermittently has blood from penis.








Current Medications: 


 Active Medications











Generic Name Dose Route Start Last Admin





  Trade Name Freq  PRN Reason Stop Dose Admin


 


Aspirin  325 mg  06/27/18 09:00  07/02/18 08:44





  Ecotrin Ec Tab*  PO   325 mg





  DAILY CELESTE   Administration





     





     





     





     


 


Atorvastatin Calcium  20 mg  06/26/18 17:00  07/01/18 17:29





  Lipitor*  PO   20 mg





  1700 CELESTE   Administration





     





     





     





     


 


Bisacodyl  10 mg  06/30/18 12:18  06/30/18 16:54





  Dulcolax Supp*  HI   10 mg





  DAILY PRN   Administration





  CONSTIPATION   





     





     





     


 


Clopidogrel Bisulfate  75 mg  06/27/18 09:00  07/02/18 08:43





  Plavix Tab*  PO   75 mg





  DAILY CELESTE   Administration





     





     





     





     


 


Dextrose  12.5 gm  06/26/18 16:10  





  D50w Syringe 50 Ml*  IV PUSH   





  .FOR FS < 60 - SS PRN   





  FS < 60   





     





     





     


 


Docusate Sodium  100 mg  06/26/18 21:00  07/02/18 08:43





  Colace Cap*  PO   100 mg





  BID CELESTE   Administration





     





     





     





     


 


Fluoxetine HCl  10 mg  06/28/18 09:00  07/02/18 08:44





  Prozac Cap*  PO   10 mg





  DAILY CELESTE   Administration





     





     





     





     


 


Glyburide  5 mg  07/01/18 21:00  07/02/18 08:44





  Diabeta Tab*  PO   5 mg





  BID CELESTE   Administration





     





     





     





     


 


Insulin Human Lispro  0 - 10 units  06/26/18 16:30  07/02/18 12:23





  Humalog*  SUBCUT   6 units





  [facility]HS CELESTE   Administration





     





     





  Protocol   





     


 


Magnesium Hydroxide  30 ml  06/26/18 15:53  06/30/18 12:20





  Milk Of Magnesia Liq*  PO   30 ml





  Q6H PRN   Administration





  CONSTIPATION   





     





     





     


 


Polyethylene Glycol/Electrolytes  17 gm  06/30/18 13:00  07/02/18 08:43





  Miralax*  PO   17 gm





  DAILY CELESTE   Administration





     





     





     





     


 


Senna  2 tab  06/26/18 15:53  06/30/18 20:15





  Senokot Tab*  PO   2 tab





  BEDTIME PRN   Administration





  CONSTIPATION   





     





     





     











Vital Signs: 


 Vital Signs











Temp Pulse Resp BP Pulse Ox


 


 97.9 F   80   18   127/57   96 


 


 07/02/18 05:27  07/02/18 05:27  07/02/18 08:00  07/02/18 05:27  07/02/18 08:00











Lab Results: 


 Laboratory Results - last 24 hr











  07/01/18 07/01/18 07/02/18





  16:45 20:13 06:09


 


WBC    9.9


 


RBC    3.92 L


 


Hgb    11.3 L


 


Hct    34 L


 


MCV    85


 


MCH    29


 


MCHC    34


 


RDW    14


 


Plt Count    314


 


MPV    7.4


 


Neut % (Auto)    78.0


 


Lymph % (Auto)    9.6 L


 


Mono % (Auto)    10.1 H


 


Eos % (Auto)    1.8


 


Baso % (Auto)    0.5


 


Absolute Neuts (auto)    7.7


 


Absolute Lymphs (auto)    0.9 L


 


Absolute Monos (auto)    1.0 H


 


Absolute Eos (auto)    0.2


 


Absolute Basos (auto)    0.1


 


Absolute Nucleated RBC    0


 


Nucleated RBC %    0


 


POC Glucose (mg/dL)  286 H  346 H 














  07/02/18 07/02/18





  07:42 11:55


 


WBC  


 


RBC  


 


Hgb  


 


Hct  


 


MCV  


 


MCH  


 


MCHC  


 


RDW  


 


Plt Count  


 


MPV  


 


Neut % (Auto)  


 


Lymph % (Auto)  


 


Mono % (Auto)  


 


Eos % (Auto)  


 


Baso % (Auto)  


 


Absolute Neuts (auto)  


 


Absolute Lymphs (auto)  


 


Absolute Monos (auto)  


 


Absolute Eos (auto)  


 


Absolute Basos (auto)  


 


Absolute Nucleated RBC  


 


Nucleated RBC %  


 


POC Glucose (mg/dL)  115 H  287 H











Exam: 





GEN: no acute distress. alert and appropriate.


LUNGS: clear bilaterally.


CV: regular rate and rhythm


ABD: + bowel sounds, soft, non-tender, non-distended.


EXT: no edema.


NEURO: left fascial droop and tongue deviates left.  Left motor shows deltoid 4

, biceps 4, triceps 3-4, ECR 4, finger abd 2, hip flex 1, knee ext 2-3, DF 2. 

Sensation impaired on left.


: dried blood at penile meatus but urine in sandoval looks clear.








Assessment/Plan: 


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 PT/OT/SLP


2. Metastatic Prostate Cancer: Sandoval in place. I d/w Dr. Bacon today ongoing 

bleeding.  Hemoglobin is stable.  He needs to be on ASA and Plavix for stroke. 

We will just have to tolerate the bleeding from his prostate.  


3. Depression: On Prozac


4. DM: On home dose glyburide 5mg bid. Continue SSI


5. DVT Prophylaxis: Heparin held due to penile bleeding


6. Advanced Directives: Full code


7. Dysphagia: regular textures, nectar thick liquids. Ice chips between meals.


8. Urinary Tract Infection: Completed 7 days of Cipro.


9. Constipation: daily miralax.  


10. Estimated LOS: anticipate d/c 7/26.





07/02/18 12:38

## 2018-07-03 LAB
RBC UR QL AUTO: (no result)
WBC UR QL AUTO: (no result)

## 2018-07-03 RX ADMIN — CLOPIDOGREL SCH MG: 75 TABLET, FILM COATED ORAL at 08:54

## 2018-07-03 RX ADMIN — DOCUSATE SODIUM SCH MG: 100 CAPSULE, LIQUID FILLED ORAL at 08:54

## 2018-07-03 RX ADMIN — INSULIN LISPRO SCH UNITS: 100 INJECTION, SOLUTION INTRAVENOUS; SUBCUTANEOUS at 21:19

## 2018-07-03 RX ADMIN — FLUOXETINE SCH MG: 10 CAPSULE ORAL at 08:54

## 2018-07-03 RX ADMIN — ASPIRIN SCH MG: 325 TABLET, COATED ORAL at 08:54

## 2018-07-03 RX ADMIN — INSULIN LISPRO SCH UNITS: 100 INJECTION, SOLUTION INTRAVENOUS; SUBCUTANEOUS at 13:00

## 2018-07-03 RX ADMIN — INSULIN LISPRO SCH: 100 INJECTION, SOLUTION INTRAVENOUS; SUBCUTANEOUS at 07:40

## 2018-07-03 RX ADMIN — GLYBURIDE SCH MG: 5 TABLET ORAL at 08:54

## 2018-07-03 RX ADMIN — POLYETHYLENE GLYCOL 3350 SCH GM: 17 POWDER, FOR SOLUTION ORAL at 08:53

## 2018-07-03 RX ADMIN — ATORVASTATIN CALCIUM SCH MG: 20 TABLET, FILM COATED ORAL at 17:25

## 2018-07-03 RX ADMIN — INSULIN LISPRO SCH UNITS: 100 INJECTION, SOLUTION INTRAVENOUS; SUBCUTANEOUS at 17:25

## 2018-07-03 RX ADMIN — GLYBURIDE SCH MG: 5 TABLET ORAL at 20:12

## 2018-07-03 RX ADMIN — DOCUSATE SODIUM SCH MG: 100 CAPSULE, LIQUID FILLED ORAL at 20:12

## 2018-07-03 NOTE — RAD
INDICATION:  Stroke.



COMPARISON:  Comparison is made with a prior CT of the brain from June 21, 2018 and MRI of

the brain from June 22, 2018.



TECHNIQUE: Contiguous axial sections of the brain were obtained from the skull base to the

vertex without contrast.



FINDINGS: The ventricles, cisterns and sulci are enlarged consistent with diffuse atrophy.

 There are small areas of decreased density in the subcortical and periventricular white

matter suggestive of mild chronic small vessel ischemic changes. There is also an area of

decreased attenuation in the inferior aspect of the beth on the right side which

correlates with the prior MRI area of restricted diffusion consistent with a

nonhemorrhagic infarct. This is better seen on the prior MR exam.



No significant focal osseous abnormality is seen. The visualized portion of the paranasal

sinuses and mastoid air cells appear clear.



IMPRESSION:  EVOLVING NONHEMORRHAGIC INFARCT IN THE BETH ON THE RIGHT SIDE.

## 2018-07-03 NOTE — PN
Progress Note


Date of Service: 07/03/18


Note: 


CAROLYN DEGROOT was visited. Nursing and therapy notes read and reviewed. 

Therapists note transfers are worse and cognitively not as sharp as previously.

  No chest pain, shortness of breath or abdominal pain.








Current Medications: 


 Active Medications











Generic Name Dose Route Start Last Admin





  Trade Name Freq  PRN Reason Stop Dose Admin


 


Aspirin  325 mg  06/27/18 09:00  07/02/18 08:44





  Ecotrin Ec Tab*  PO   325 mg





  DAILY CELESTE   Administration





     





     





     





     


 


Atorvastatin Calcium  20 mg  06/26/18 17:00  07/02/18 17:27





  Lipitor*  PO   20 mg





  1700 CELESTE   Administration





     





     





     





     


 


Bisacodyl  10 mg  06/30/18 12:18  06/30/18 16:54





  Dulcolax Supp*  CA   10 mg





  DAILY PRN   Administration





  CONSTIPATION   





     





     





     


 


Clopidogrel Bisulfate  75 mg  06/27/18 09:00  07/02/18 08:43





  Plavix Tab*  PO   75 mg





  DAILY CELESTE   Administration





     





     





     





     


 


Dextrose  12.5 gm  06/26/18 16:10  





  D50w Syringe 50 Ml*  IV PUSH   





  .FOR FS < 60 - SS PRN   





  FS < 60   





     





     





     


 


Docusate Sodium  100 mg  06/26/18 21:00  07/02/18 20:52





  Colace Cap*  PO   100 mg





  BID CELESTE   Administration





     





     





     





     


 


Fluoxetine HCl  10 mg  06/28/18 09:00  07/02/18 08:44





  Prozac Cap*  PO   10 mg





  DAILY CELESTE   Administration





     





     





     





     


 


Glyburide  5 mg  07/01/18 21:00  07/02/18 20:53





  Diabeta Tab*  PO   5 mg





  BID CELESTE   Administration





     





     





     





     


 


Insulin Human Lispro  0 - 10 units  06/26/18 16:30  07/03/18 07:40





  Humalog*  SUBCUT   Not Given





  [facility]   





     





     





  Protocol   





     


 


Magnesium Hydroxide  30 ml  06/26/18 15:53  06/30/18 12:20





  Milk Of Magnesia Liq*  PO   30 ml





  Q6H PRN   Administration





  CONSTIPATION   





     





     





     


 


Polyethylene Glycol/Electrolytes  17 gm  06/30/18 13:00  07/02/18 08:43





  Miralax*  PO   17 gm





  DAILY CELESTE   Administration





     





     





     





     


 


Senna  2 tab  06/26/18 15:53  06/30/18 20:15





  Senokot Tab*  PO   2 tab





  BEDTIME PRN   Administration





  CONSTIPATION   





     





     





     











Vital Signs: 


 Vital Signs











Temp Pulse Resp BP Pulse Ox


 


 99.2 F   82   18   123/50   93 


 


 07/03/18 05:32  07/03/18 05:32  07/03/18 05:32  07/03/18 05:32  07/03/18 05:32











Lab Results: 


 Laboratory Results - last 24 hr











  07/02/18 07/02/18 07/02/18





  11:55 17:07 20:13


 


POC Glucose (mg/dL)  287 H  196 H  222 H














  07/03/18





  07:38


 


POC Glucose (mg/dL)  91











Exam: 





GEN: no acute distress. alert and appropriate.


LUNGS: clear bilaterally.


CV: regular rate and rhythm


ABD: + bowel sounds, soft, non-tender, non-distended.


EXT: no edema.


NEURO: left fascial droop and tongue deviates left.  Left motor shows deltoid 4

, biceps 4, triceps 4, ECR 4, finger abd 2,  DF 3. Sensation impaired on left.


: dried blood at penile meatus but urine in sandoval looks clear.








Assessment/Plan: 


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 PT/OT/SLP.  His exam is a bit improved

, so unclear why he is functionally a bit worse.  I will get updated CT head, 

check UA and ask neurology to f/u.


2. Metastatic Prostate Cancer: Sandoval in place. I d/w Dr. Bacon 7/2 ongoing 

bleeding.  Hemoglobin is stable.  He needs to be on ASA and Plavix for stroke. 

We will just have to tolerate the bleeding from his prostate.  


3. Depression: On Prozac


4. DM: On home dose glyburide 5mg bid. Continue SSI


5. DVT Prophylaxis: Heparin held due to penile bleeding


6. Advanced Directives: Full code


7. Dysphagia: regular textures, nectar thick liquids. Ice chips between meals.


8. Urinary Tract Infection: Completed 7 days of Cipro.


9. Constipation: daily miralax.  


10. Estimated LOS: anticipate d/c 7/26.








07/03/18 08:49

## 2018-07-03 NOTE — CONS
CC:  Dr. Ibrahim

 

NEUROLOGY FOLLOWUP CONSULTATION:

 

DATE OF FOLLOWUP:  07/03/18

 

LOCATION:  He is in the [facility] room 251.

 

REFERRING PROVIDER:  Dr. Jackson.

 

CHIEF COMPLAINT:  Pontine infarction, worsened fatigue and weakness.

 

HISTORY OF PRESENT ILLNESS:  Samuel Garcia is an 85-year-old right-handed man who was admitted to the [facility] on 06/22/18 when he presented with fluctuating weakness of his left side.  He was evaluated i
n the emergency room and had a Neurology consultation with Dr. Drake Kiran.  He had an MRI, which show
ed a right paramedian pontine infarction.  He was on aspirin at home and started on Plavix.  He did n
ot have a CT angiogram or MR angiogram, but did have a carotid ultrasound, which showed a significant
 bilateral internal carotid stenosis.  He seemed to be not as energetic, slow to speak in the last co
uple of days and so, Dr. Jackson obtained some laboratory studies and a brain CT, which I reviewed. 
 It shows he has evolving right pontine infarction, but nothing new.  His laboratory studies are fair
ly unremarkable with fairly normal CBC other than a hematocrit of 34%.  A chemistry profile with a mi
ldly elevated BUN at 25 and creatinine at 1.31.  Glucose was 144 earlier today.

 

REVIEW OF SYSTEMS:  He denies problems with vision or double vision.  He admits to difficulty swallow
ing and that he has to be careful.  He says his left arm and leg are still very weak and he does not 
see much improvement.  He denies any pain in his limbs, but his left hand and arm feels a little bit 
numb to him.

 

PHYSICAL EXAMINATION:  Temperature most recently 99.2, was 99.8 yesterday afternoon.  Blood pressures
 running consistently about 123/50 to 60.  Heart rate is in the 80s and regular, respiratory rate 18 
and oxygen saturation is 93% on room air.  Neck is supple.  Heart tones seem normal.  I do not hear m
urmurs.  I do not auscultate any cervical bruits.  Oral mucosa is fairly moist.

 

Neurologically, pupils react equally from 3 down to about 2 mm.  Eye movements are normal.  Visual fi
elds are full to confrontation.  Facial musculature is notable for mild flattening of the left nasola
bial fold.  Palate and tongue appear normal and speech is clear.  He is very soft spoken.

 

Motor exam reveals pretty good strength raising the right arm and leg.  He is able to elevate the lef
t arm above his shoulder.  He has difficulty with left wrist extension, can just barely achieve antig
ravity.  He has present, but weak  in the left hand.  He can raise the left leg up a little bit, 
but cannot maintain it. He can dorsiflex the left foot now for a little bit of resistance and has pre
tty decent plantarflexion strength on the left.

 

He is alert, but very tired appearing.  Memory seems reasonably intact.  Language is generally fluent
.

 

IMPRESSION:  Impression is that of fatigue and perhaps a little bit of dehydration in an 85-year-old 
recuperating from a brain stem stroke.  I do not see any evidence to suggest a new or extended stroke
.  He may be a little bit dehydrated, but his labs are otherwise look okay and there does not appear 
to be any evidence of an infection.

 

I will just continue his rehab and try to keep him well hydrated.  If he continues to do poorly or de
velops new symptoms, I would be happy to reevaluate him.

 

 [id number]/CPS #: [id number]

## 2018-07-03 NOTE — PMRUTEAM
PMRU: Team Meeting


Current Status: 


 Nursing: Current Status











Skin Deviations [Bilateral     Abrasion





Buttocks]                      


 


Skin Deviations [Midline Back] Abrasion


 


Skin Deviations [Bilateral     Other





Foot]                          


 


Skin Deviation Description [   healed,reportedly s/p fall prior to hosp; red,





Bilateral Buttocks]            blanchable


 


Skin Deviation Description [   old scarring present from previous falls PTA





Midline Back]                  


 


Skin Deviation Description [   dry skin





Bilateral Foot]                











 Physical Therapy: Current Status











Bed Mobility Assistance        Max Assist


 


Transfer Moblility Assistance  Max Assist,Total Assist


 


Transfer/Bed Mobility          None





Recommended Devices            


 


Ambulation Assistance          Unable


 


Ambulation Assistive Devices   Rolling Walker


 


Stairs Assistance              unable


 


Stairs Recommended Devices     Two Rails


 


Number of Stairs               8














 Occupational Therapy: Current Status











Upper Body Dressing            Total Assist


 


Lower Body Dressing            Total Assist,2 Person Assist


 


Bathing                        Max Asst,2 Person Assist


 


Toileting                      Total Assist,2 Person Assist


 


Toilet Transfer                Total Assist,2 Person Assist


 


Toilet Transfer Progress       EZ stand


 


Shower Transfer                Total Assist


 


Shower Transfer Progress       w/c to roll in shower


 


Eating                         Supervision


 


Eating Progress                setupA














 Rec Therapy: Current Status











Summary of Assessment and      RT assessment is complete and patient is aware of





Clinical Impression            leisure services. Radio has been provided in his





 room for enjoyment.


 


Treatment Goals                Patient will engage in recreation and leisure





 activities while on the unit


 


Treatment Plan                 Provide and encourage involvement in RT services














 Social Work: Current Status











Discharge Plan                 TBD, pt is hoping to move in with his son.


 


Potential for Family Training  TBD


 


Anticipated Discharge          Home





Destination                    


 


Discharge With                 home care svs and family support














 Nutrition: Current Status











Monitoring                     noted with declind appetite this past week; Cr





 rising (1.1 to 1.3).  Accepting only 30-75% of





 meals (vs %).  Stating that he feels very





 tired; H/H stable.  Glyburide was just added to





 regimen 7/1, given BGs 286-346.  Improved -





 287 since glyburide started.  Having daily formed





 BMs.  Slow progression noted w/SLP; cont to





 receive regular solids and nectar-thick liquids.











 Speech: Current Status











Assessment                     Patient is progressing slowly as expected.





 Recommend Allow ice chips between meals; remove





 ice at mealtime


 


Speech Current Status Goal 1   Mild-moderate dysphagia


 


Speech Goal 2 Current Status   Moderate impairment (6/30/18)


 


Speech Goal 3 Current Status   Moderate impairment (6/30/18)














Goals: 


 Physical Therapy: Updated Goals


Modified independent transfer, and short distance ambulation with hemiwalker.





 Occupational Therapy: Initial Goals











Goals to be Completed in (Days 21-28





)                              


 


Upper Body Bathing Routine     Modified Independent with


 


Lower Body Bathing Routine     Modified Independent with


 


Upper Body Dressing Routine    Modified Independent with


 


Lower Body Dressing Routine    Modified Independent with


 


Toilet Hygeine and Clothing    Modified Independent with





Management Routine             


 


Toilet Transfer Routine        Modified Independent with


 


Tub Transfer Routine           Modified Independent with


 


Functional Transfers for ADL   Modified Independent with


 


Grooming Routine               Independent


 


Feeding Routine                Modified Independent with


 


Light Housekeeping Tasks       Moderate Assist











 Nutrition: Goals











Intervention Goals             1. pt will tolerate least-restrictive diet 

texture





 without evidence difficulty chewing/swallowing





 2. adequate po intake to support stable wt without





 further wt loss





 3. adequate glycemic control per inpt parameters;





 no s/sx hypo- or hyperglycemia





 4. pt will avoid grapefruit/grapefruit juice on





 statin therapy











 Speech: Goals











Speech Goal 1                  Swallowing


 


Speech Evaluation Status Goal  Mild-moderate dysphagia





1                              


 


Speech Current Status Goal 1   Mild-moderate dysphagia


 


Goal 1 Comments                Long-Term Goal: Patient will tolerate least





 restrictive diet consistencies w/ no clinical s/s





 aspiration





 Short Term Goals:





 1)  STG: Pt will tolerate nectar thick liquids w/





 no clinical s/s aspiration.





 Status:  progressing as expected





 Given moderate cueing, patient followed chin tuck





 and supraglottic compensatory swallow techniques,





 and patient tolerated 9/10 trials w/o throat





 clearing.





 STG: Pt will tolerate thin liquids w/ no clinical





 s/s aspiration.





 Status:  progressing as expected





 SLP provided moderate skilled instuction and





 prompting for patient to use compensatory swallow





 strategies with thin water and ice chips. Patient





 demonstated brief wet cough after 2/2 trials of





 thin water, and no clinical s/s aspiration after





 multiple 8x ice chips.





 


 


Speech Goal 2                  Memory


 


Speech Goal 2 Current Status   Moderate impairment (6/30/18)


 


Speech Goal 2 Comments         Long-Term Goal: Pt will use compensatory





 strategies to encode and retrieve 4/4 new items or





 compensatory stategy steps after delay of one day





 , Independently, for independence in mobility





 safety, ADLs and community access.





 Short-term Goal:  Pt will use compensatory





 strategies to encode and retrieve 3/4 new items or





 compensatory stategy steps  after delay of 10





 minutes, given Moderate skilled instruction and





 cueing.





 Status:  Progressing as expected.





 


 


Speech Goal 3                  Problem Solving


 


Speech Goal 3 Current Status   Moderate impairment (6/30/18)


 


Speech Goal 3 Comments         Long-Term Goal: Pt will use compensatory





 strategies to solve moderately complex routine





 problems, with 100% accuracy, Independently, for





 ADLs such as shopping, time and money management.





 Short-Term Goal: Pt will use compensatory





 strategies to solve moderately complex routine





 problems, with 80% accuracy, given Moderate





 skilled instruction and cueing.





 Status:  Progressing as expected.





 Patient completed writing his menu order for the





 next day with minimal cueing to attend to all





 sections, and to clarify requests for smaller





 portions.














 Social Work: Goals











Discharge Plan                 TBD, pt is hoping to move in with his son.


 


Potential for Family Training  TBD


 


Anticipated Discharge          Home





Destination                    


 


Discharge With                 home care svs and family support

















Care Plan: 


 Care Plan





ADL's - Improve/Maintain                Start:  06/27/18 14:48              


Freq:   DAILY                           Status: Active      Target:         


Protocol:                                                                   








Activity Type Activity Date Activity User E-Sign Co-Sign Detail





Recorded Client Recorded Date Recorded By   


 


Document 07/03/18 11:18 [id number]   





[id number]-C09 07/03/18 11:18 [id number]   














  07/03/18





  11:18


 


PMRU Outcome: ADL's/ADL Transfers 


 


Orders/Interventions Occupational





 Therapy





 Evaluation &





 Treatment





 Communication





 Tool in Patient





 Room


 


Device Yes


 


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


 


Patient to receive OT 5x/wk for  Therex





min/day Self Care





 Management





 Group Therapy





 Neuromuscular





 ReEducation


 


UE/LE ADL's with Assist Yes: Raymond


 


ADL Transfers with Assist Yes: Raymond


 


Toileting: Transfers,Clothing Management Yes: Raymond





,Hygeine w/Assist 


 


Light Kitchen/Laundry w/Assist Yes: modA


 


Progression Toward Outcome/Goals Progressing


 


Outcome/Goals Met Pt participated





 fair in ADL





 treatment





 session,





 increased





 fatigue,





 increased





 difficulty





 following





 commands,





 increased





 assistance





 required for





 transfers and





 ADLs this date.








Communication-Improve/Maintain          Start:  06/29/18 10:06              


Freq:   DAILY                           Status: Active      Target:         


Protocol:                                                                   








Activity Type Activity Date Activity User E-Sign Co-Sign Detail





Recorded Client Recorded Date Recorded By   


 


Document 07/02/18 23:39 [id number]   





PMRU-C03 07/02/18 23:39 [id number]   














  07/02/18





  23:39


 


PMRU Outcome: Communication/Cognitive 





Status 


 


Outcome/Goals Makes Needs





 Known





 Effectively


 


Progression Toward Outcomes/Goals Progressing








Coping/Psych-Improve/Maintain           Start:  06/26/18 16:22              


Freq:   QSHIFT                          Status: Active      Target:         


Protocol:                                                                   








Activity Type Activity Date Activity User E-Sign Co-Sign Detail





Recorded Client Recorded Date Recorded By   


 


Document 07/03/18 08:00 [id number]   





[id number]-C07 07/03/18 10:00 [id number]   














  07/03/18





  08:00


 


PMRU Outcome: Coping/Psychosocial 


 


Coping Outcome/Goals Verbalization





 of Acceptance





 of Rehab Admit





 Willingness to





 Participate in





 Treatment Plan





 and Basic Needs





 Utilization of





 Available





 Support Systems


 


Psychosocial Outcome/Goals Maintain/





 Improve





 Emotional





 Health





 Cooperate/





 Participate in





 Plan


 


Progression Toward Outcome/Goals - Progressing





Coping 


 


Progression Toward Outcome/Goals - Progressing





Psychosocial 








DVT Prophylaxis- Improve/Maintain       Start:  06/26/18 16:22              


Freq:   QSHIFT                          Status: Active      Target:         


Protocol:                                                                   








Activity Type Activity Date Activity User E-Sign Co-Sign Detail





Recorded Client Recorded Date Recorded By   


 


Document 07/03/18 08:00 [id number]   





PMRU-C07 07/03/18 10:00 [id number]   














  07/03/18





  08:00


 


PMRU Outcome: DVT Prophylaxis 


 


Outcome/Goals Remains Free of





 DVT





 Complies with





 DVT Prophylaxis





 /Treatment





 Demonstrates





 Knowledge of





 DVT Prevention/





 Treatment





 TEDS Stockings





 on Every AM,





 Off at HS


 


Progression Toward Outcome/Goals Progressing








Discharge Planning - Improve/Maintain   Start:  06/26/18 16:22              


Freq:   DAILY@1200                      Status: Active      Target:         


Protocol:                                                                   








Activity Type Activity Date Activity User E-Sign Co-Sign Detail





Recorded Client Recorded Date Recorded By   


 


Document 07/02/18 23:39 [id number]   





PMRU-C03 07/02/18 23:39 [id number]   














  07/02/18





  23:39


 


PMRU Outcome: Discharge Planning 


 


Update Patient Family No


 


Outcome/Goals Demonstrates





 Understanding





 of Discharge





 Plan


 


Progression Toward Outcome/Goals Progressing








Education-Improve/Maintain              Start:  06/26/18 16:22              


Freq:   QSHIFT                          Status: Active      Target:         


Protocol:                                                                   








Activity Type Activity Date Activity User E-Sign Co-Sign Detail





Recorded Client Recorded Date Recorded By   


 


Document 07/03/18 08:00 [id number]   





[id number]-C07 07/03/18 10:00 [id number]   














  07/03/18





  08:00


 


PMRU Outcome: Education 


 


Outcome/Goals Encourage





 Questions


 


Progression Toward Outcome/Goals Progressing








/GI-Improve/Maintain                  Start:  06/26/18 16:22              


Freq:   QSHIFT                          Status: Active      Target:         


Protocol:                                                                   








Activity Type Activity Date Activity User E-Sign Co-Sign Detail





Recorded Client Recorded Date Recorded By   


 


Document 07/03/18 08:00 [id number]   





PMRU-C07 07/03/18 10:00 [id number]   














  07/03/18





  08:00


 


PMRU Outcome: Genitourinary/ 





Gastrointestinal 


 


Genitourinary- Outcome/Goals Remain Free of





 Hospital-





 Acquired UTI


 


Gastrointestinal-Outcome/Goals Maintain/





 Achieve Bowel





 Regularity in





 Accordance with





 Pt's Baseline





 Prevent





 Constipation





 Laxatives as





 Ordered


 


Other Outcome/Goals has chronic





 sandoval d/t





 prostate CA.


 


Progression Toward Outcome/Goals -  Progressing


 


Progression Toward Outcome/Goals - GI Progressing








Medication Administration               Start:  06/26/18 16:22              


Freq:   QSHIFT                          Status: Active      Target:         


Protocol:                                                                   








Activity Type Activity Date Activity User E-Sign Co-Sign Detail





Recorded Client Recorded Date Recorded By   


 


Document 07/03/18 08:00 [id number]   





PMRU-C07 07/03/18 10:00 [id number]   














  07/03/18





  08:00


 


PMRU Outcome: Medication Administration 


 


Assess Patient Knowledge/Teach Med Yes





Education for all Meds 


 


Outcome/Goals Patient





 Independent





 with Medication





 Administration





 at Home





 Demonstrates





 Understanding


 


Progression Towards Outcome/Goals Progressing


 


Is Patient Going Home on Lovenox? No








Metabolic Status- Improve/Maintain      Start:  06/26/18 16:22              


Freq:   QSHIFT                          Status: Active      Target:         


Protocol:                                                                   








Activity Type Activity Date Activity User E-Sign Co-Sign Detail





Recorded Client Recorded Date Recorded By   


 


Document 07/03/18 08:00 [id number]   





PMRU-C07 07/03/18 10:00 [id number]   














  07/03/18





  08:00


 


PMRU Outcome: Metabolic Status 


 


Have Fingersticks Been Ordered Yes


 


Fingerstick Order Frequency AC & HS


 


Outcome/Goals Maintain/





 Improve





 Metabolic





 Status





 Demonstrate





 Knowledge of





 Prevention/





 Treatment of





 Metabolic





 Imbalances


 


Progression Toward Outcome/Goals Progressing








Mobility- Improve/Maintain              Start:  06/30/18 15:55              


Freq:   DAILY                           Status: Active      Target:         


Protocol:                                                                   








Activity Type Activity Date Activity User E-Sign Co-Sign Detail





Recorded Client Recorded Date Recorded By   


 


Document 06/30/18 16:00 [id number]   





SSU-C14 06/30/18 16:00 [id number]   














  06/30/18





  16:00


 


PMRU Outcome: Mobility 


 


Physical Therapy Evaluation and Yes





Treatment 


 


Activity OOB with Assistance Yes


 


Device Yes


 


Assistance Yes


 


Patient to be seen 5x/wk for  min/ Therex





day for: Mobility





 Training





 Gait Training





 W/C Mobility





 Balance


 


Outcome/Goals Maintain/





 Achieve





 Baseline





 Mobility Status





 Improve





 Mobility Status





 Demonstrates





 Proper Use of





 Assistive





 Devices





 Free from





 Complications





 of Immobility


 


Progression Toward Outcome/Goals Progressing


 


Bed Mobility Yes:





 independent


 


Transfers Yes:





 independent





 with RW


 


Gait x ft Yes: CGA with





 rolling walker





 150'


 


Up/Down Stairs Yes: Min assist





 up/down 8





 stairs with 2





 rails.








Neurological- Improve/Maintain          Start:  06/26/18 16:22              


Freq:   QSHIFT                          Status: Active      Target:         


Protocol:                                                                   








Activity Type Activity Date Activity User E-Sign Co-Sign Detail





Recorded Client Recorded Date Recorded By   


 


Document 07/03/18 08:00 [id number]   





PMRU-C07 07/03/18 10:00 [id number]   














  07/03/18





  08:00


 


PMRU Outcome: Neurological 


 


Weakness/Aphasia Weakness





 Left Side


 


Outcome/Goals Maintain/





 Achieve





 Baseline





 Neurological





 Status





 Improve





 Neurological





 Status





 Prevent





 Avoidable





 Neurological





 Decline





 Maintain/





 Improve





 Strength/ROM


 


Progression Toward Outcome/Goals Progressing








Nutrition-Improve/Maintain              Start:  06/26/18 18:14              


Freq:   DAILY@0400,1600                 Status: Active      Target:         


Protocol:                                                                   








Activity Type Activity Date Activity User E-Sign Co-Sign Detail





Recorded Client Recorded Date Recorded By   


 


Document 07/02/18 23:39 [id number]   





PMRU-C03 07/02/18 23:39 [id number]   














  07/02/18





  23:39


 


Outcome: Nutrition 


 


Outcome/Goals Demonstrates





 Adequate





 Hydration





 Maintain/





 Improve





 Nutritional





 Status





 Other


 


Progression Toward Outcome/Goals Progressing








Nutrition/Swallowing- Improve/Maintain  Start:  06/29/18 10:07              


Freq:   QSHIFT                          Status: Active      Target:         


Protocol:                                                                   








Activity Type Activity Date Activity User E-Sign Co-Sign Detail





Recorded Client Recorded Date Recorded By   


 


Document 07/03/18 08:00 [id number]   





PMRU-C07 07/03/18 10:00 [id number]   














  07/03/18





  08:00


 


PMRU Outcome: Nutrition/Swallowing 


 


Outcome/Goals Maintain/





 Improve





 Nutritional





 Status


 


Progression Toward Outcome/Goals Progressing








Safety- Improve/Maintain                Start:  06/26/18 16:22              


Freq:   QSHIFT                          Status: Active      Target:         


Protocol:                                                                   








Activity Type Activity Date Activity User E-Sign Co-Sign Detail





Recorded Client Recorded Date Recorded By   


 


Document 07/03/18 08:00 [id number]   





PMRU-C07 07/03/18 10:00 [id number]   














  07/03/18





  08:00


 


PMRU Outcome: Safety 


 


Outcome/Goals Remain Free of





 Injury or Harm





 Cooperates with





 Safety





 Measures for





 Least





 Restrictive





 Environment





 Prevent Falls/





 Injury


 


Progression Toward Outcome/Goals Progressing


 


Outcome/Goals Met Comment PA in place








Skin- Improve/Maintain                  Start:  06/26/18 16:22              


Freq:   QSHIFT                          Status: Active      Target:         


Protocol:                                                                   








Activity Type Activity Date Activity User E-Sign Co-Sign Detail





Recorded Client Recorded Date Recorded By   


 


Document 07/03/18 08:00 [id number]   





PMRU-C07 07/03/18 10:00 [id number]   














  07/03/18





  08:00


 


PMRU Outcome: Skin 


 


Skin Risk Level Medium


 


Skin Orders Turn/Position





 q2hr While in





 Bed


 


Outcome/Goals Maintain/





 Improve Skin





 Intergrity





 Free from





 Decubitus


 


Progression Toward Outcome/Goals Progressing














Medicine Note: 








Length of Stay:  [3.5 weeks]





Anticipated Discharge Destination: Home





Tentative Discharge Date:  [7/26/18]





Discharged to:  [to son's home]

## 2018-07-04 LAB
BASOPHILS # BLD AUTO: 0.1 10^3/UL (ref 0–0.2)
EOSINOPHIL # BLD AUTO: 0.2 10^3/UL (ref 0–0.6)
HCT VFR BLD AUTO: 31 % (ref 42–52)
HGB BLD-MCNC: 10.3 G/DL (ref 14–18)
LYMPHOCYTES # BLD AUTO: 1.2 10^3/UL (ref 1–4.8)
MCH RBC QN AUTO: 29 PG (ref 27–31)
MCHC RBC AUTO-ENTMCNC: 34 G/DL (ref 31–36)
MCV RBC AUTO: 86 FL (ref 80–94)
MONOCYTES # BLD AUTO: 1.1 10^3/UL (ref 0–0.8)
NEUTROPHILS # BLD AUTO: 9.6 10^3/UL (ref 1.5–7.7)
NRBC # BLD AUTO: 0 10^3/UL
NRBC BLD QL AUTO: 0
PLATELET # BLD AUTO: 289 10^3/UL (ref 150–450)
RBC # BLD AUTO: 3.61 10^6/UL (ref 4–5.4)
WBC # BLD AUTO: 12.2 10^3/UL (ref 3.5–10.8)

## 2018-07-04 RX ADMIN — DOCUSATE SODIUM SCH MG: 100 CAPSULE, LIQUID FILLED ORAL at 09:29

## 2018-07-04 RX ADMIN — CLOPIDOGREL SCH MG: 75 TABLET, FILM COATED ORAL at 09:29

## 2018-07-04 RX ADMIN — GLYBURIDE SCH MG: 5 TABLET ORAL at 21:03

## 2018-07-04 RX ADMIN — ASPIRIN SCH MG: 325 TABLET, COATED ORAL at 09:29

## 2018-07-04 RX ADMIN — INSULIN LISPRO SCH UNITS: 100 INJECTION, SOLUTION INTRAVENOUS; SUBCUTANEOUS at 21:03

## 2018-07-04 RX ADMIN — POLYETHYLENE GLYCOL 3350 SCH GM: 17 POWDER, FOR SOLUTION ORAL at 09:29

## 2018-07-04 RX ADMIN — INSULIN LISPRO SCH UNITS: 100 INJECTION, SOLUTION INTRAVENOUS; SUBCUTANEOUS at 12:07

## 2018-07-04 RX ADMIN — GLYBURIDE SCH MG: 5 TABLET ORAL at 09:28

## 2018-07-04 RX ADMIN — DOCUSATE SODIUM SCH MG: 100 CAPSULE, LIQUID FILLED ORAL at 21:03

## 2018-07-04 RX ADMIN — INSULIN LISPRO SCH: 100 INJECTION, SOLUTION INTRAVENOUS; SUBCUTANEOUS at 16:54

## 2018-07-04 RX ADMIN — ATORVASTATIN CALCIUM SCH MG: 20 TABLET, FILM COATED ORAL at 17:12

## 2018-07-04 RX ADMIN — FLUOXETINE SCH MG: 10 CAPSULE ORAL at 09:29

## 2018-07-04 RX ADMIN — INSULIN LISPRO SCH: 100 INJECTION, SOLUTION INTRAVENOUS; SUBCUTANEOUS at 09:16

## 2018-07-04 NOTE — RAD
INDICATION: Leukocytosis



COMPARISON: None

 

TECHNIQUE: PA and lateral dual-energy views were obtained.



FINDINGS: 



Bones/Soft Tissues: There are no acute bony findings.



Cardiomediastinal: The cardiomediastinal silhouette is normal. 



Lungs: There are no infiltrates.



Pleura: There are no pleural effusions. 



Other: None



IMPRESSION:  NO ACTIVE DISEASE.

## 2018-07-04 NOTE — PN
Progress Note


Date of Service: 07/04/18


Note: 


CAROLYN DEGROOT was visited. Nursing and therapy notes read and reviewed. No 

chest pain, shortness of breath or abdominal pain.  He feels tired overall.








Current Medications: 


 Active Medications











Generic Name Dose Route Start Last Admin





  Trade Name Freq  PRN Reason Stop Dose Admin


 


Aspirin  325 mg  06/27/18 09:00  07/04/18 09:29





  Ecotrin Ec Tab*  PO   325 mg





  DAILY CELESTE   Administration





     





     





     





     


 


Atorvastatin Calcium  20 mg  06/26/18 17:00  07/03/18 17:25





  Lipitor*  PO   20 mg





  1700 CELESTE   Administration





     





     





     





     


 


Bisacodyl  10 mg  06/30/18 12:18  06/30/18 16:54





  Dulcolax Supp*  TX   10 mg





  DAILY PRN   Administration





  [facility]   





     





     





     


 


Clopidogrel Bisulfate  75 mg  06/27/18 09:00  07/04/18 09:29





  Plavix Tab*  PO   75 mg





  DAILY CELESTE   Administration





     





     





     





     


 


Dextrose  12.5 gm  06/26/18 16:10  





  D50w Syringe 50 Ml*  IV PUSH   





  .FOR FS < 60 - SS PRN   





  FS < 60   





     





     





     


 


Docusate Sodium  100 mg  06/26/18 21:00  07/04/18 09:29





  Colace Cap*  PO   100 mg





  BID CELESTE   Administration





     





     





     





     


 


Fluoxetine HCl  10 mg  06/28/18 09:00  07/04/18 09:29





  Prozac Cap*  PO   10 mg





  DAILY CELESTE   Administration





     





     





     





     


 


Glyburide  5 mg  07/01/18 21:00  07/04/18 09:28





  Diabeta Tab*  PO   5 mg





  BID CELESTE   Administration





     





     





     





     


 


Sodium Chloride  1,000 mls @ 50 mls/hr  07/04/18 10:30  





  Ns 0.9% 1000 Ml*  IV   





  PER RATE [facility]   





     





     





     





     


 


Insulin Human Lispro  0 - 10 units  06/26/18 16:30  07/04/18 09:16





  Humalog*  SUBCUT   Not Given





  [facility]   





     





     





  Protocol   





     


 


Magnesium Hydroxide  30 ml  06/26/18 15:53  06/30/18 12:20





  Milk Of Magnesia Liq*  PO   30 ml





  Q6H PRN   Administration





  [facility]   





     





     





     


 


Polyethylene Glycol/Electrolytes  17 gm  06/30/18 13:00  07/04/18 09:29





  Miralax*  PO   17 gm





  DAILY CELESTE   Administration





     





     





     





     


 


Senna  2 tab  06/26/18 15:53  06/30/18 20:15





  Senokot Tab*  PO   2 tab





  BEDTIME PRN   Administration





  [facility]   





     





     





     











Vital Signs: 


 Vital Signs











Temp Pulse Resp BP Pulse Ox


 


 97.2 F   83   20   114/54   97 


 


 07/04/18 05:39  07/04/18 05:39  07/04/18 05:39  07/04/18 05:39  07/04/18 05:39











Lab Results: 


 Laboratory Results - last 24 hr











  07/03/18 07/03/18 07/03/18





  10:02 12:00 12:30


 


WBC   


 


RBC   


 


Hgb   


 


Hct   


 


MCV   


 


MCH   


 


MCHC   


 


RDW   


 


Plt Count   


 


MPV   


 


Neut % (Auto)   


 


Lymph % (Auto)   


 


Mono % (Auto)   


 


Eos % (Auto)   


 


Baso % (Auto)   


 


Absolute Neuts (auto)   


 


Absolute Lymphs (auto)   


 


Absolute Monos (auto)   


 


Absolute Eos (auto)   


 


Absolute Basos (auto)   


 


Absolute Nucleated RBC   


 


Nucleated RBC %   


 


Sodium  135  


 


Potassium  4.2  


 


Chloride  100 L  


 


Carbon Dioxide  27  


 


Anion Gap  8  


 


BUN  25 H  


 


Creatinine  1.31 H  


 


Est GFR ( Amer)  62.9  


 


Est GFR (Non-Af Amer)  52.0  


 


BUN/Creatinine Ratio  19.1  


 


Glucose  144 H  


 


POC Glucose (mg/dL)   224 H 


 


Calcium  8.7  


 


Total Bilirubin  0.50  


 


AST  14  


 


ALT  7  


 


Alkaline Phosphatase  80  


 


Total Protein  6.6  


 


Albumin  2.9 L  


 


Globulin  3.7  


 


Albumin/Globulin Ratio  0.8 L  


 


Urine Color    Yellow


 


Urine Appearance    Cloudy


 


Urine pH    5.0


 


Ur Specific Gravity    1.018


 


Urine Protein    2+(100 mg/dl) A


 


Urine Ketones    Negative


 


Urine Blood    2+ A


 


Urine Nitrate    Negative


 


Urine Bilirubin    Negative


 


Urine Urobilinogen    Negative


 


Ur Leukocyte Esterase    Trace A


 


Urine WBC (Auto)    3+(>20/hpf) A


 


Urine RBC (Auto)    3+(>10/hpf) A


 


Urine Bacteria    Absent


 


Urine Glucose    1+(50 mg/dl) A














  07/03/18 07/03/18 07/04/18





  16:30 20:13 07:26


 


WBC   


 


RBC   


 


Hgb   


 


Hct   


 


MCV   


 


MCH   


 


MCHC   


 


RDW   


 


Plt Count   


 


MPV   


 


Neut % (Auto)   


 


Lymph % (Auto)   


 


Mono % (Auto)   


 


Eos % (Auto)   


 


Baso % (Auto)   


 


Absolute Neuts (auto)   


 


Absolute Lymphs (auto)   


 


Absolute Monos (auto)   


 


Absolute Eos (auto)   


 


Absolute Basos (auto)   


 


Absolute Nucleated RBC   


 


Nucleated RBC %   


 


Sodium   


 


Potassium   


 


Chloride   


 


Carbon Dioxide   


 


Anion Gap   


 


BUN   


 


Creatinine   


 


Est GFR ( Amer)   


 


Est GFR (Non-Af Amer)   


 


BUN/Creatinine Ratio   


 


Glucose   


 


POC Glucose (mg/dL)  289 H  259 H  75


 


Calcium   


 


Total Bilirubin   


 


AST   


 


ALT   


 


Alkaline Phosphatase   


 


Total Protein   


 


Albumin   


 


Globulin   


 


Albumin/Globulin Ratio   


 


Urine Color   


 


Urine Appearance   


 


Urine pH   


 


Ur Specific Gravity   


 


Urine Protein   


 


Urine Ketones   


 


Urine Blood   


 


Urine Nitrate   


 


Urine Bilirubin   


 


Urine Urobilinogen   


 


Ur Leukocyte Esterase   


 


Urine WBC (Auto)   


 


Urine RBC (Auto)   


 


Urine Bacteria   


 


Urine Glucose   














  07/04/18 07/04/18





  08:45 08:46


 


WBC  12.2 H 


 


RBC  3.61 L 


 


Hgb  10.3 L 


 


Hct  31 L 


 


MCV  86 


 


MCH  29 


 


MCHC  34 


 


RDW  14 


 


Plt Count  289 


 


MPV  7.5 


 


Neut % (Auto)  78.9 


 


Lymph % (Auto)  10.1 L 


 


Mono % (Auto)  8.8 H 


 


Eos % (Auto)  1.7 


 


Baso % (Auto)  0.5 


 


Absolute Neuts (auto)  9.6 H 


 


Absolute Lymphs (auto)  1.2 


 


Absolute Monos (auto)  1.1 H 


 


Absolute Eos (auto)  0.2 


 


Absolute Basos (auto)  0.1 


 


Absolute Nucleated RBC  0 


 


Nucleated RBC %  0 


 


Sodium   136


 


Potassium   4.0


 


Chloride   103


 


Carbon Dioxide   27


 


Anion Gap   6


 


BUN   25 H


 


Creatinine   1.13


 


Est GFR ( Amer)   74.6


 


Est GFR (Non-Af Amer)   61.7


 


BUN/Creatinine Ratio   22.1 H


 


Glucose   59 L


 


POC Glucose (mg/dL)  


 


Calcium   8.5 L


 


Total Bilirubin   0.40


 


AST   20


 


ALT   9


 


Alkaline Phosphatase   70


 


Total Protein   6.1 L


 


Albumin   2.6 L


 


Globulin   3.5


 


Albumin/Globulin Ratio   0.7 L


 


Urine Color  


 


Urine Appearance  


 


Urine pH  


 


Ur Specific Gravity  


 


Urine Protein  


 


Urine Ketones  


 


Urine Blood  


 


Urine Nitrate  


 


Urine Bilirubin  


 


Urine Urobilinogen  


 


Ur Leukocyte Esterase  


 


Urine WBC (Auto)  


 


Urine RBC (Auto)  


 


Urine Bacteria  


 


Urine Glucose  











Exam: 





GEN: no acute distress. alert and appropriate.


LUNGS: clear bilaterally.


CV: regular rate and rhythm


ABD: + bowel sounds, soft, non-tender, non-distended.


EXT: no edema.


NEURO: left fascial droop and tongue deviates left.  Left motor shows deltoid 4

, biceps 4, triceps 4, ECR 4, finger abd 2,  hip flexion 2, DF 4. Sensation 

impaired on left.


: clotted blood at penile meatus. urine in sandoval tinged pink.





CT Head 7/3/18 showed evolution of prior pontine infarct.








Assessment/Plan: 


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 PT/OT/SLP.  Appreciate neurology f/u.

  CT head as expected post prior CVA on 7/3/18.


2. Metastatic Prostate Cancer: Sandoval in place. I d/w Dr. Bacon 7/2 ongoing 

bleeding.  Hemoglobin is stable.  He needs to be on ASA and Plavix for stroke. 

We will just have to tolerate the bleeding from his prostate.  


3. Depression: Increase Prozac to 20mg 7/5.


4. DM: On home dose glyburide 5mg bid. Continue SSI


5. DVT Prophylaxis: Heparin held due to penile bleeding


6. Advanced Directives: Full code


7. Dysphagia: regular textures, nectar thick liquids. Ice chips between meals.


8. Leukocytosis with prior Urinary Tract Infection s/p 7 days of Cipro. Await 

repeat urine culture. Get chest x-ray and blood cultures today.


9. Dehydration: Cr better today, but BUN/Cr up. Will give 1L NS today.


10. Constipation: daily miralax.  


11. Estimated LOS: anticipate d/c 7/26. 








07/04/18 10:28

## 2018-07-05 LAB
BASOPHILS # BLD AUTO: 0.1 10^3/UL (ref 0–0.2)
EOSINOPHIL # BLD AUTO: 0.4 10^3/UL (ref 0–0.6)
HCT VFR BLD AUTO: 30 % (ref 42–52)
HGB BLD-MCNC: 10.2 G/DL (ref 14–18)
LYMPHOCYTES # BLD AUTO: 0.9 10^3/UL (ref 1–4.8)
MCH RBC QN AUTO: 29 PG (ref 27–31)
MCHC RBC AUTO-ENTMCNC: 34 G/DL (ref 31–36)
MCV RBC AUTO: 85 FL (ref 80–94)
MONOCYTES # BLD AUTO: 0.8 10^3/UL (ref 0–0.8)
NEUTROPHILS # BLD AUTO: 6.2 10^3/UL (ref 1.5–7.7)
NRBC # BLD AUTO: 0 10^3/UL
NRBC BLD QL AUTO: 0.1
PLATELET # BLD AUTO: 302 10^3/UL (ref 150–450)
RBC # BLD AUTO: 3.53 10^6/UL (ref 4–5.4)
WBC # BLD AUTO: 8.4 10^3/UL (ref 3.5–10.8)

## 2018-07-05 RX ADMIN — FLUOXETINE SCH MG: 20 CAPSULE ORAL at 09:40

## 2018-07-05 RX ADMIN — ATORVASTATIN CALCIUM SCH MG: 20 TABLET, FILM COATED ORAL at 17:20

## 2018-07-05 RX ADMIN — LIDOCAINE HYDROCHLORIDE SCH APPLIC: 20 JELLY TOPICAL at 16:22

## 2018-07-05 RX ADMIN — ASPIRIN SCH MG: 325 TABLET, COATED ORAL at 09:40

## 2018-07-05 RX ADMIN — GLYBURIDE SCH MG: 5 TABLET ORAL at 21:03

## 2018-07-05 RX ADMIN — CLOPIDOGREL SCH MG: 75 TABLET, FILM COATED ORAL at 09:39

## 2018-07-05 RX ADMIN — INSULIN LISPRO SCH UNITS: 100 INJECTION, SOLUTION INTRAVENOUS; SUBCUTANEOUS at 21:03

## 2018-07-05 RX ADMIN — INSULIN LISPRO SCH UNITS: 100 INJECTION, SOLUTION INTRAVENOUS; SUBCUTANEOUS at 17:19

## 2018-07-05 RX ADMIN — INSULIN LISPRO SCH UNITS: 100 INJECTION, SOLUTION INTRAVENOUS; SUBCUTANEOUS at 12:31

## 2018-07-05 RX ADMIN — INSULIN LISPRO SCH: 100 INJECTION, SOLUTION INTRAVENOUS; SUBCUTANEOUS at 08:03

## 2018-07-05 RX ADMIN — DOCUSATE SODIUM SCH MG: 100 CAPSULE, LIQUID FILLED ORAL at 09:40

## 2018-07-05 RX ADMIN — POLYETHYLENE GLYCOL 3350 SCH GM: 17 POWDER, FOR SOLUTION ORAL at 09:38

## 2018-07-05 RX ADMIN — GLYBURIDE SCH MG: 5 TABLET ORAL at 09:40

## 2018-07-05 RX ADMIN — LIDOCAINE HYDROCHLORIDE SCH: 20 JELLY TOPICAL at 21:03

## 2018-07-05 RX ADMIN — DOCUSATE SODIUM SCH MG: 100 CAPSULE, LIQUID FILLED ORAL at 21:03

## 2018-07-05 NOTE — PN
Progress Note


Date of Service: 07/05/18


Note: 


CAROLYN DEGROOT was visited. Nursing notes read and reviewed. No therapy 

yesterday.  Tolerated 1L IVF. Ready for breakfast.








Current Medications: 


 Active Medications











Generic Name Dose Route Start Last Admin





  Trade Name Freq  PRN Reason Stop Dose Admin


 


Aspirin  325 mg  06/27/18 09:00  07/04/18 09:29





  Ecotrin Ec Tab*  PO   325 mg





  DAILY CELESTE   Administration





     





     





     





     


 


Atorvastatin Calcium  20 mg  06/26/18 17:00  07/04/18 17:12





  Lipitor*  PO   20 mg





  1700 CELESTE   Administration





     





     





     





     


 


Bisacodyl  10 mg  06/30/18 12:18  06/30/18 16:54





  Dulcolax Supp*  HI   10 mg





  DAILY PRN   Administration





  [facility]   





     





     





     


 


Clopidogrel Bisulfate  75 mg  06/27/18 09:00  07/04/18 09:29





  Plavix Tab*  PO   75 mg





  DAILY CELESTE   Administration





     





     





     





     


 


Dextrose  12.5 gm  06/26/18 16:10  





  D50w Syringe 50 Ml*  IV PUSH   





  .FOR FS < 60 - SS PRN   





  FS < 60   





     





     





     


 


Docusate Sodium  100 mg  06/26/18 21:00  07/04/18 21:03





  Colace Cap*  PO   100 mg





  BID CELESTE   Administration





     





     





     





     


 


Fluoxetine HCl  10 mg  06/28/18 09:00  07/04/18 09:29





  Prozac Cap*  PO   10 mg





  DAILY CELESTE   Administration





     





     





     





     


 


Glyburide  5 mg  07/01/18 21:00  07/04/18 21:03





  Diabeta Tab*  PO   5 mg





  BID CELESTE   Administration





     





     





     





     


 


Insulin Human Lispro  0 - 10 units  06/26/18 16:30  07/05/18 08:03





  Humalog*  SUBCUT   Not Given





  [facility]   





     





     





  Protocol   





     


 


Magnesium Hydroxide  30 ml  06/26/18 15:53  06/30/18 12:20





  Milk Of Magnesia Liq*  PO   30 ml





  Q6H PRN   Administration





  [facility]   





     





     





     


 


Polyethylene Glycol/Electrolytes  17 gm  06/30/18 13:00  07/04/18 09:29





  Miralax*  PO   17 gm





  DAILY CELESTE   Administration





     





     





     





     


 


Senna  2 tab  06/26/18 15:53  06/30/18 20:15





  Senokot Tab*  PO   2 tab





  BEDTIME PRN   Administration





  [facility]   





     





     





     











Vital Signs: 


 Vital Signs











Temp Pulse Resp BP Pulse Ox


 


 97.8 F   69   17   115/48   95 


 


 07/05/18 05:48  07/05/18 05:48  07/05/18 05:48  07/05/18 05:48  07/05/18 05:48











Lab Results: 


 Laboratory Results - last 24 hr











  07/04/18 07/04/18 07/04/18





  08:45 08:46 11:39


 


WBC  12.2 H  


 


RBC  3.61 L  


 


Hgb  10.3 L  


 


Hct  31 L  


 


MCV  86  


 


MCH  29  


 


MCHC  34  


 


RDW  14  


 


Plt Count  289  


 


MPV  7.5  


 


Neut % (Auto)  78.9  


 


Lymph % (Auto)  10.1 L  


 


Mono % (Auto)  8.8 H  


 


Eos % (Auto)  1.7  


 


Baso % (Auto)  0.5  


 


Absolute Neuts (auto)  9.6 H  


 


Absolute Lymphs (auto)  1.2  


 


Absolute Monos (auto)  1.1 H  


 


Absolute Eos (auto)  0.2  


 


Absolute Basos (auto)  0.1  


 


Absolute Nucleated RBC  0  


 


Nucleated RBC %  0  


 


Sodium   136 


 


Potassium   4.0 


 


Chloride   103 


 


Carbon Dioxide   27 


 


Anion Gap   6 


 


BUN   25 H 


 


Creatinine   1.13 


 


Est GFR ( Amer)   74.6 


 


Est GFR (Non-Af Amer)   61.7 


 


BUN/Creatinine Ratio   22.1 H 


 


Glucose   59 L 


 


POC Glucose (mg/dL)    193 H


 


Calcium   8.5 L 


 


Total Bilirubin   0.40 


 


AST   20 


 


ALT   9 


 


Alkaline Phosphatase   70 


 


Total Protein   6.1 L 


 


Albumin   2.6 L 


 


Globulin   3.5 


 


Albumin/Globulin Ratio   0.7 L 














  07/04/18 07/04/18 07/05/18





  16:43 20:18 06:56


 


WBC    8.4


 


RBC    3.53 L


 


Hgb    10.2 L


 


Hct    30 L


 


MCV    85


 


MCH    29


 


MCHC    34


 


RDW    14


 


Plt Count    302


 


MPV    7.3 L


 


Neut % (Auto)    74.2


 


Lymph % (Auto)    11.4 L


 


Mono % (Auto)    9.2 H


 


Eos % (Auto)    4.5


 


Baso % (Auto)    0.7


 


Absolute Neuts (auto)    6.2


 


Absolute Lymphs (auto)    0.9 L


 


Absolute Monos (auto)    0.8


 


Absolute Eos (auto)    0.4


 


Absolute Basos (auto)    0.1


 


Absolute Nucleated RBC    0


 


Nucleated RBC %    0.1


 


Sodium   


 


Potassium   


 


Chloride   


 


Carbon Dioxide   


 


Anion Gap   


 


BUN   


 


Creatinine   


 


Est GFR ( Amer)   


 


Est GFR (Non-Af Amer)   


 


BUN/Creatinine Ratio   


 


Glucose   


 


POC Glucose (mg/dL)  127 H  204 H 


 


Calcium   


 


Total Bilirubin   


 


AST   


 


ALT   


 


Alkaline Phosphatase   


 


Total Protein   


 


Albumin   


 


Globulin   


 


Albumin/Globulin Ratio   














  07/05/18 07/05/18





  06:56 07:50


 


WBC  


 


RBC  


 


Hgb  


 


Hct  


 


MCV  


 


MCH  


 


MCHC  


 


RDW  


 


Plt Count  


 


MPV  


 


Neut % (Auto)  


 


Lymph % (Auto)  


 


Mono % (Auto)  


 


Eos % (Auto)  


 


Baso % (Auto)  


 


Absolute Neuts (auto)  


 


Absolute Lymphs (auto)  


 


Absolute Monos (auto)  


 


Absolute Eos (auto)  


 


Absolute Basos (auto)  


 


Absolute Nucleated RBC  


 


Nucleated RBC %  


 


Sodium  136 


 


Potassium  3.9 


 


Chloride  103 


 


Carbon Dioxide  27 


 


Anion Gap  6 


 


BUN  24 


 


Creatinine  1.08 


 


Est GFR ( Amer)  78.6 


 


Est GFR (Non-Af Amer)  65.0 


 


BUN/Creatinine Ratio  22.2 H 


 


Glucose  79 


 


POC Glucose (mg/dL)   85


 


Calcium  8.3 L 


 


Total Bilirubin  


 


AST  


 


ALT  


 


Alkaline Phosphatase  


 


Total Protein  


 


Albumin  


 


Globulin  


 


Albumin/Globulin Ratio  











Urine culture negative form 7/3/18. 


Pending blood cultures.





Exam: 





GEN: no acute distress. alert and appropriate.


LUNGS: clear bilaterally.


CV: regular rate and rhythm


ABD: + bowel sounds, soft, non-tender, non-distended.


EXT: no edema.


NEURO: left fascial droop and tongue deviates left.  Left motor shows deltoid 4

, biceps 4, triceps 4, ECR 4, finger abd 2,  hip flexion 2, DF 4. Sensation 

impaired on left.


: penile meatus is currently clean. urine in sandoval clear.





CXR 7/4/18 - no active disease.





Assessment/Plan: 


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 PT/OT/SLP.  Appreciate neurology f/u. 

CT Head 7/3/18 showed evolution of prior pontine infarct.


2. Metastatic Prostate Cancer: Sandoval in place. Intermittenly has clots at 

sandoval. Dr. Bacon in today and would like sandoval changed to 18Fr flexible 

catheter with 20cc in balloon once fully advanced.  I d/w nursing as well. 

Hemoglobin with slight drop over last few days.  He needs to be on ASA and 

Plavix for stroke. 


3. Depression: Increased Prozac to 20mg 7/5. Consider add low dose ritalin.


4. DM: On home dose glyburide 5mg bid. Continue SSI


5. DVT Prophylaxis: Heparin held due to penile bleeding


6. Advanced Directives: Full code


7. Dysphagia: regular textures, nectar thick liquids. Ice chips between meals.


8. Leukocytosis resolved.  Pending blood cultures.  CXR clear on 7/4/18.  

Urinary Tract Infection s/p 7 days of Cipro. 


9. Dehydration: Cr better today, but BUN/Cr still slightly up. Hep lock IV and 

continue to encourage po fluids.


10. Constipation: daily miralax.  


11. Estimated LOS: anticipate d/c 7/26. 








07/05/18 09:04

## 2018-07-06 RX ADMIN — ATORVASTATIN CALCIUM SCH MG: 20 TABLET, FILM COATED ORAL at 17:22

## 2018-07-06 RX ADMIN — MAGNESIUM HYDROXIDE PRN ML: 400 SUSPENSION ORAL at 20:56

## 2018-07-06 RX ADMIN — INSULIN LISPRO SCH UNITS: 100 INJECTION, SOLUTION INTRAVENOUS; SUBCUTANEOUS at 20:56

## 2018-07-06 RX ADMIN — ASPIRIN SCH MG: 325 TABLET, COATED ORAL at 07:54

## 2018-07-06 RX ADMIN — CLOPIDOGREL SCH MG: 75 TABLET, FILM COATED ORAL at 07:54

## 2018-07-06 RX ADMIN — LIDOCAINE HYDROCHLORIDE SCH: 20 JELLY TOPICAL at 14:05

## 2018-07-06 RX ADMIN — FLUOXETINE SCH MG: 20 CAPSULE ORAL at 07:54

## 2018-07-06 RX ADMIN — DOCUSATE SODIUM SCH MG: 100 CAPSULE, LIQUID FILLED ORAL at 07:54

## 2018-07-06 RX ADMIN — POLYETHYLENE GLYCOL 3350 SCH: 17 POWDER, FOR SOLUTION ORAL at 07:54

## 2018-07-06 RX ADMIN — LIDOCAINE HYDROCHLORIDE SCH: 20 JELLY TOPICAL at 17:32

## 2018-07-06 RX ADMIN — INSULIN LISPRO SCH UNITS: 100 INJECTION, SOLUTION INTRAVENOUS; SUBCUTANEOUS at 17:22

## 2018-07-06 RX ADMIN — LIDOCAINE HYDROCHLORIDE SCH APPLIC: 20 JELLY TOPICAL at 06:59

## 2018-07-06 RX ADMIN — GLYBURIDE SCH MG: 5 TABLET ORAL at 20:56

## 2018-07-06 RX ADMIN — INSULIN LISPRO SCH: 100 INJECTION, SOLUTION INTRAVENOUS; SUBCUTANEOUS at 07:39

## 2018-07-06 RX ADMIN — LIDOCAINE HYDROCHLORIDE SCH: 20 JELLY TOPICAL at 22:13

## 2018-07-06 RX ADMIN — INSULIN LISPRO SCH UNITS: 100 INJECTION, SOLUTION INTRAVENOUS; SUBCUTANEOUS at 12:30

## 2018-07-06 RX ADMIN — DOCUSATE SODIUM SCH MG: 100 CAPSULE, LIQUID FILLED ORAL at 20:56

## 2018-07-06 RX ADMIN — GLYBURIDE SCH MG: 5 TABLET ORAL at 07:54

## 2018-07-06 NOTE — PN
Progress Note


Date of Service: 07/06/18


Note: 


CAROLYN DEGROOT was visited. Nursing and therapy notes read and reviewed. No new 

issues overnight.  No chest pain, shortness of breath or abdominal pain. Some 

discomfort of pressure from sandoval catheter.








Current Medications: 


 Active Medications











Generic Name Dose Route Start Last Admin





  Trade Name Freq  PRN Reason Stop Dose Admin


 


Aspirin  325 mg  06/27/18 09:00  07/06/18 07:54





  Ecotrin Ec Tab*  PO   325 mg





  DAILY CELESTE   Administration





     





     





     





     


 


Atorvastatin Calcium  20 mg  06/26/18 17:00  07/05/18 17:20





  Lipitor*  PO   20 mg





  1700 CELESTE   Administration





     





     





     





     


 


Bisacodyl  10 mg  06/30/18 12:18  06/30/18 16:54





  Dulcolax Supp*  DC   10 mg





  DAILY PRN   Administration





  [facility]   





     





     





     


 


Clopidogrel Bisulfate  75 mg  06/27/18 09:00  07/06/18 07:54





  Plavix Tab*  PO   75 mg





  DAILY CELESTE   Administration





     





     





     





     


 


Dextrose  12.5 gm  06/26/18 16:10  





  D50w Syringe 50 Ml*  IV PUSH   





  .FOR FS < 60 - SS PRN   





  FS < 60   





     





     





     


 


Docusate Sodium  100 mg  06/26/18 21:00  07/06/18 07:54





  Colace Cap*  PO   100 mg





  BID CELESTE   Administration





     





     





     





     


 


Fluoxetine HCl  20 mg  07/05/18 09:00  07/06/18 07:54





  Prozac Cap*  PO   20 mg





  DAILY CELESTE   Administration





     





     





     





     


 


Glyburide  5 mg  07/01/18 21:00  07/06/18 07:54





  Diabeta Tab*  PO   5 mg





  BID CELESTE   Administration





     





     





     





     


 


Insulin Human Lispro  0 - 10 units  06/26/18 16:30  07/06/18 07:39





  Humalog*  SUBCUT   Not Given





  [facility]   





     





     





  Protocol   





     


 


Lidocaine HCl  1 applic  07/05/18 17:00  07/06/18 06:59





  Lidocaine 2% Jelly*  TOPICAL   1 applic





  QID CELESTE   Administration





     





     





     





     


 


Magnesium Hydroxide  30 ml  06/26/18 15:53  06/30/18 12:20





  Milk Of Magnesia Liq*  PO   30 ml





  Q6H PRN   Administration





  [facility]   





     





     





     


 


Polyethylene Glycol/Electrolytes  17 gm  06/30/18 13:00  07/06/18 07:54





  Miralax*  PO   Not Given





  DAILY CELESTE   





     





     





     





     


 


Senna  2 tab  06/26/18 15:53  06/30/18 20:15





  Senokot Tab*  PO   2 tab





  BEDTIME PRN   Administration





  [facility]   





     





     





     











Vital Signs: 


 Vital Signs











Temp Pulse Resp BP Pulse Ox


 


 98.2 F   65   17   114/41   99 


 


 07/06/18 06:37  07/06/18 06:37  07/06/18 08:00  07/06/18 06:37  07/06/18 08:00











Lab Results: 


 Laboratory Results - last 24 hr











  07/05/18 07/05/18 07/05/18





  11:41 16:50 19:55


 


POC Glucose (mg/dL)  134 H  271 H  228 H














  07/06/18





  07:35


 


POC Glucose (mg/dL)  62 L











Exam: 





GEN: no acute distress. alert and appropriate.


LUNGS: clear bilaterally.


CV: regular rate and rhythm


ABD: + bowel sounds, soft, non-tender, non-distended.


EXT: no edema.


NEURO: left fascial droop and tongue deviates left.  Left motor shows deltoid 4

, biceps 4, triceps 4, ECR 4, finger abd 2,  hip flexion while supine 2, DF 4. 

Sensation impaired on left.


: penile meatus with some dried. urine in sandoval tea colored.





CXR 7/4/18 - no active disease.


Assessment/Plan: 


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 PT/OT/SLP.  Appreciate neurology f/u. 

CT Head 7/3/18 showed evolution of prior pontine infarct.


2. Metastatic Prostate Cancer with prostatic bleeding: Sandoval changed to 18Fr 

with 20cc in balloon on 7/5 per Dr. Bacon.  Follow clinically. He needs to 

be on ASA and Plavix for stroke. f/u CBC tomorrow.


3. Depression: Increased Prozac to 20mg 7/5. Consider add low dose ritalin.


4. DM: On home dose glyburide 5mg bid. Continue SSI


5. DVT Prophylaxis: Heparin held due to penile bleeding


6. Advanced Directives: Full code


7. Dysphagia: regular textures, nectar thick liquids. Ice chips between meals.


8. Leukocytosis resolved.  Blood cultures negative to date. Urine culture 

negative.  CXR clear on 7/4/18.  Urinary Tract Infection present on admit s/p 7 

days of Cipro. 


9. Dehydration: s/p IVF 7/4-7/5. Encourage po fluids.  Repeat labs tomorrow. 


10. Constipation: daily miralax.  


11. Estimated LOS: anticipate d/c 7/26. 





07/06/18 10:48

## 2018-07-07 RX ADMIN — LIDOCAINE HYDROCHLORIDE SCH: 20 JELLY TOPICAL at 21:40

## 2018-07-07 RX ADMIN — INSULIN LISPRO SCH: 100 INJECTION, SOLUTION INTRAVENOUS; SUBCUTANEOUS at 07:34

## 2018-07-07 RX ADMIN — ATORVASTATIN CALCIUM SCH MG: 20 TABLET, FILM COATED ORAL at 17:00

## 2018-07-07 RX ADMIN — ACETAMINOPHEN PRN MG: 325 TABLET ORAL at 22:04

## 2018-07-07 RX ADMIN — LIDOCAINE HYDROCHLORIDE SCH APPLIC: 20 JELLY TOPICAL at 14:59

## 2018-07-07 RX ADMIN — ASPIRIN SCH MG: 325 TABLET, COATED ORAL at 11:05

## 2018-07-07 RX ADMIN — DOCUSATE SODIUM SCH: 100 CAPSULE, LIQUID FILLED ORAL at 21:36

## 2018-07-07 RX ADMIN — LIDOCAINE HYDROCHLORIDE SCH: 20 JELLY TOPICAL at 17:05

## 2018-07-07 RX ADMIN — INSULIN LISPRO SCH UNITS: 100 INJECTION, SOLUTION INTRAVENOUS; SUBCUTANEOUS at 21:40

## 2018-07-07 RX ADMIN — DOCUSATE SODIUM SCH: 100 CAPSULE, LIQUID FILLED ORAL at 11:39

## 2018-07-07 RX ADMIN — CLOPIDOGREL SCH MG: 75 TABLET, FILM COATED ORAL at 11:05

## 2018-07-07 RX ADMIN — INSULIN LISPRO SCH: 100 INJECTION, SOLUTION INTRAVENOUS; SUBCUTANEOUS at 11:39

## 2018-07-07 RX ADMIN — GLYBURIDE SCH MG: 5 TABLET ORAL at 21:40

## 2018-07-07 RX ADMIN — LIDOCAINE HYDROCHLORIDE SCH: 20 JELLY TOPICAL at 11:39

## 2018-07-07 RX ADMIN — FLUOXETINE SCH MG: 20 CAPSULE ORAL at 11:05

## 2018-07-07 RX ADMIN — POLYETHYLENE GLYCOL 3350 SCH: 17 POWDER, FOR SOLUTION ORAL at 11:39

## 2018-07-07 RX ADMIN — GLYBURIDE SCH MG: 5 TABLET ORAL at 11:05

## 2018-07-07 RX ADMIN — INSULIN LISPRO SCH UNITS: 100 INJECTION, SOLUTION INTRAVENOUS; SUBCUTANEOUS at 17:00

## 2018-07-07 NOTE — PN
Progress Note


Date of Service: 07/07/18


Note: 


CAROLYN DEGROOT was visited. Nursing and therapy notes read and reviewed. No 

chest pain, shortness of breath or abdominal pain.  Nursing thinks penile 

bleeding has lessened since the most recent catheter change.








Current Medications: 


 Active Medications











Generic Name Dose Route Start Last Admin





  Trade Name Freq  PRN Reason Stop Dose Admin


 


Aspirin  325 mg  06/27/18 09:00  07/06/18 07:54





  Ecotrin Ec Tab*  PO   325 mg





  DAILY CELESTE   Administration





     





     





     





     


 


Atorvastatin Calcium  20 mg  06/26/18 17:00  07/06/18 17:22





  Lipitor*  PO   20 mg





  1700 CELESTE   Administration





     





     





     





     


 


Bisacodyl  10 mg  06/30/18 12:18  06/30/18 16:54





  Dulcolax Supp*  VA   10 mg





  DAILY PRN   Administration





  [facility]   





     





     





     


 


Clopidogrel Bisulfate  75 mg  06/27/18 09:00  07/06/18 07:54





  Plavix Tab*  PO   75 mg





  DAILY CELESTE   Administration





     





     





     





     


 


Dextrose  12.5 gm  06/26/18 16:10  





  D50w Syringe 50 Ml*  IV PUSH   





  .FOR FS < 60 - SS PRN   





  FS < 60   





     





     





     


 


Docusate Sodium  100 mg  06/26/18 21:00  07/06/18 20:56





  Colace Cap*  PO   100 mg





  BID CELESTE   Administration





     





     





     





     


 


Fluoxetine HCl  20 mg  07/05/18 09:00  07/06/18 07:54





  Prozac Cap*  PO   20 mg





  DAILY CELESTE   Administration





     





     





     





     


 


Glyburide  5 mg  07/01/18 21:00  07/06/18 20:56





  Diabeta Tab*  PO   5 mg





  BID CELESTE   Administration





     





     





     





     


 


Insulin Human Lispro  0 - 10 units  06/26/18 16:30  07/07/18 07:34





  Humalog*  SUBCUT   Not Given





  [facility] include [facility] beds at the [facility]   





     





     





  Protocol   





     


 


Lidocaine HCl  1 applic  07/05/18 17:00  07/06/18 22:13





  Lidocaine 2% Jelly*  TOPICAL   Not Given





  QID Counts include 234 beds at the [facility]   





     





     





     





     


 


Magnesium Hydroxide  30 ml  06/26/18 15:53  07/06/18 20:56





  Milk Of Magnesia Liq*  PO   30 ml





  Q6H PRN   Administration





  CONSTIPATION   





     





     





     


 


Polyethylene Glycol/Electrolytes  17 gm  06/30/18 13:00  07/06/18 07:54





  Miralax*  PO   Not Given





  DAILY CELESTE   





     





     





     





     


 


Senna  2 tab  06/26/18 15:53  06/30/18 20:15





  Senokot Tab*  PO   2 tab





  BEDTIME PRN   Administration





  CONSTIPATION   





     





     





     











Vital Signs: 


 Vital Signs











Temp Pulse Resp BP Pulse Ox


 


 97.5 F   74   18   124/69   96 


 


 07/07/18 04:57  07/07/18 04:57  07/07/18 04:57  07/07/18 04:57  07/07/18 04:57











Lab Results: 


 Laboratory Results - last 24 hr











  07/06/18 07/06/18 07/06/18





  11:55 16:58 20:47


 


POC Glucose (mg/dL)  144 H  153 H  201 H














  07/07/18





  07:33


 


POC Glucose (mg/dL)  73











Exam: 





GEN: no acute distress. alert and appropriate.


LUNGS: clear bilaterally.


CV: regular rate and rhythm


ABD: + bowel sounds, soft, non-tender, non-distended.


EXT: no edema.


NEURO: left fascial droop and tongue deviates left.  Left motor shows deltoid 4

, biceps 4, triceps 4, ECR 4, finger abd 2,  hip flexion while supine 2+, DF 4. 

Sensation impaired on left.


: penile meatus with some dried. urine in sandoval pink tinged.








Assessment/Plan: 


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 PT/OT/SLP.  Appreciate neurology f/u. 

CT Head 7/3/18 showed evolution of prior pontine infarct.


2. Metastatic Prostate Cancer with prostatic bleeding: Sandoval changed to 18Fr 

with 20cc in balloon on 7/5 per Dr. Bacon.  Bleeding seems less since last 

catheter change. He needs to be on ASA and Plavix for stroke. f/u CBC Sunday.


3. Depression: Increased Prozac to 20mg 7/5. Start low dose ritalin 5mg in AM.


4. DM: On home dose glyburide 5mg bid. Continue SSI


5. DVT Prophylaxis: Heparin held due to penile bleeding


6. Advanced Directives: Full code


7. Dysphagia: regular textures, nectar thick liquids. Ice chips between meals.


8. Leukocytosis resolved.  Blood cultures negative. Urine culture negative.  

CXR clear on 7/4/18.  Urinary Tract Infection present on admit s/p 7 days of 

Cipro. 


9. Dehydration: s/p IVF 7/4-7/5. Encourage po fluids.  Repeat labs Sunday. 


10. Constipation: daily miralax.  


11. Estimated LOS: anticipate d/c 7/26. 








07/07/18 10:03

## 2018-07-08 LAB
BASOPHILS # BLD AUTO: 0.1 10^3/UL (ref 0–0.2)
EOSINOPHIL # BLD AUTO: 0.5 10^3/UL (ref 0–0.6)
HCT VFR BLD AUTO: 29 % (ref 42–52)
HGB BLD-MCNC: 10 G/DL (ref 14–18)
LYMPHOCYTES # BLD AUTO: 1 10^3/UL (ref 1–4.8)
MCH RBC QN AUTO: 29 PG (ref 27–31)
MCHC RBC AUTO-ENTMCNC: 35 G/DL (ref 31–36)
MCV RBC AUTO: 84 FL (ref 80–94)
MONOCYTES # BLD AUTO: 0.8 10^3/UL (ref 0–0.8)
NEUTROPHILS # BLD AUTO: 6.9 10^3/UL (ref 1.5–7.7)
NRBC # BLD AUTO: 0 10^3/UL
NRBC BLD QL AUTO: 0
PLATELET # BLD AUTO: 304 10^3/UL (ref 150–450)
RBC # BLD AUTO: 3.44 10^6/UL (ref 4–5.4)
WBC # BLD AUTO: 9.2 10^3/UL (ref 3.5–10.8)

## 2018-07-08 RX ADMIN — LIDOCAINE HYDROCHLORIDE SCH: 20 JELLY TOPICAL at 14:48

## 2018-07-08 RX ADMIN — INSULIN LISPRO SCH UNITS: 100 INJECTION, SOLUTION INTRAVENOUS; SUBCUTANEOUS at 12:32

## 2018-07-08 RX ADMIN — ASPIRIN SCH MG: 325 TABLET, COATED ORAL at 09:49

## 2018-07-08 RX ADMIN — METHYLPHENIDATE HYDROCHLORIDE SCH MG: 5 TABLET ORAL at 06:41

## 2018-07-08 RX ADMIN — DOCUSATE SODIUM SCH MG: 100 CAPSULE, LIQUID FILLED ORAL at 09:49

## 2018-07-08 RX ADMIN — LIDOCAINE HYDROCHLORIDE SCH: 20 JELLY TOPICAL at 20:07

## 2018-07-08 RX ADMIN — POLYETHYLENE GLYCOL 3350 SCH: 17 POWDER, FOR SOLUTION ORAL at 10:42

## 2018-07-08 RX ADMIN — FLUOXETINE SCH MG: 20 CAPSULE ORAL at 09:49

## 2018-07-08 RX ADMIN — LIDOCAINE HYDROCHLORIDE SCH: 20 JELLY TOPICAL at 17:17

## 2018-07-08 RX ADMIN — INSULIN LISPRO SCH UNITS: 100 INJECTION, SOLUTION INTRAVENOUS; SUBCUTANEOUS at 17:13

## 2018-07-08 RX ADMIN — ATORVASTATIN CALCIUM SCH MG: 20 TABLET, FILM COATED ORAL at 17:13

## 2018-07-08 RX ADMIN — DOCUSATE SODIUM SCH MG: 100 CAPSULE, LIQUID FILLED ORAL at 20:09

## 2018-07-08 RX ADMIN — INSULIN LISPRO SCH: 100 INJECTION, SOLUTION INTRAVENOUS; SUBCUTANEOUS at 09:50

## 2018-07-08 RX ADMIN — LIDOCAINE HYDROCHLORIDE SCH: 20 JELLY TOPICAL at 10:42

## 2018-07-08 RX ADMIN — CLOPIDOGREL SCH MG: 75 TABLET, FILM COATED ORAL at 09:49

## 2018-07-08 RX ADMIN — GLYBURIDE SCH MG: 5 TABLET ORAL at 09:49

## 2018-07-08 RX ADMIN — MAGNESIUM HYDROXIDE PRN ML: 400 SUSPENSION ORAL at 20:07

## 2018-07-08 NOTE — PN
Progress Note


Date of Service: 07/08/18


Note: 


CAROLYN DEGROOT was visited. Nursing and therapy notes read and reviewed. 

Yesterday nursing called regarding bladder spasms. He was given a dose of 

ditropan last night. He feels lightheaded this morning and says the bladder 

spasms yesterday were no different than what he has felt all along since the 

catheter was changed with the larger balloon. He got first dose of ritalin also 

this morning.  No chest pain, shortness of breath or abdominal pain.  Yesterday 

he had bowel urgency. He has not received Miralax the last few days as ordered 

due to staff having difficulty thickening it.  He eats poorly and wonders why 

he is getting glyburide and insulin.  








Current Medications: 


 Active Medications











Generic Name Dose Route Start Last Admin





  Trade Name Freq  PRN Reason Stop Dose Admin


 


Acetaminophen  650 mg  07/07/18 19:55  07/07/18 22:04





  Tylenol Tab*  PO   650 mg





  Q6H PRN   Administration





  PAIN   





     





     





     


 


Aspirin  325 mg  06/27/18 09:00  07/08/18 09:49





  Ecotrin Ec Tab*  PO   325 mg





  DAILY CELESTE   Administration





     





     





     





     


 


Atorvastatin Calcium  20 mg  06/26/18 17:00  07/07/18 17:00





  Lipitor*  PO   20 mg





  1700 CELESTE   Administration





     





     





     





     


 


Bisacodyl  10 mg  06/30/18 12:18  06/30/18 16:54





  Dulcolax Supp*  TX   10 mg





  DAILY PRN   Administration





  CONSTIPATION   





     





     





     


 


Clopidogrel Bisulfate  75 mg  06/27/18 09:00  07/08/18 09:49





  Plavix Tab*  PO   75 mg





  DAILY CELESTE   Administration





     





     





     





     


 


Dextrose  12.5 gm  06/26/18 16:10  





  D50w Syringe 50 Ml*  IV PUSH   





  .FOR FS < 60 - SS PRN   





  FS < 60   





     





     





     


 


Docusate Sodium  100 mg  06/26/18 21:00  07/08/18 09:49





  Colace Cap*  PO   100 mg





  BID CELESTE   Administration





     





     





     





     


 


Fluoxetine HCl  20 mg  07/05/18 09:00  07/08/18 09:49





  Prozac Cap*  PO   20 mg





  DAILY CELESTE   Administration





     





     





     





     


 


Glyburide  5 mg  07/09/18 09:00  





  Diabeta Tab*  PO   





  DAILY CELESTE   





     





     





     





     


 


Insulin Human Lispro  0 - 10 units  07/08/18 11:30  





  Humalog*  SUBCUT   





  [facility]   





     





     





  Protocol   





     


 


Lidocaine HCl  1 applic  07/05/18 17:00  07/07/18 21:40





  Lidocaine 2% Jelly*  TOPICAL   Not Given





  QID CELESTE   





     





     





     





     


 


Magnesium Hydroxide  30 ml  06/26/18 15:53  07/06/18 20:56





  Milk Of Magnesia Liq*  PO   30 ml





  Q6H PRN   Administration





  CONSTIPATION   





     





     





     


 


Methylphenidate HCl  5 mg  07/08/18 07:00  07/08/18 06:41





  Ritalin Tab*  PO   5 mg





  0700 CELESTE   Administration





     





     





     





     


 


Polyethylene Glycol/Electrolytes  17 gm  06/30/18 13:00  07/07/18 11:39





  Miralax*  PO   Not Given





  DAILY CELESTE   





     





     





     





     


 


Senna  2 tab  06/26/18 15:53  06/30/18 20:15





  Senokot Tab*  PO   2 tab





  BEDTIME PRN   Administration





  CONSTIPATION   





     





     





     











Vital Signs: 


 Vital Signs











Temp Pulse Resp BP Pulse Ox


 


 98.4 F   70   18   136/63   97 


 


 07/07/18 16:11  07/07/18 16:11  07/07/18 22:05  07/07/18 16:47  07/07/18 16:11











Lab Results: 


 Laboratory Results - last 24 hr











  07/07/18 07/07/18 07/07/18





  11:38 16:51 20:10


 


WBC   


 


RBC   


 


Hgb   


 


Hct   


 


MCV   


 


MCH   


 


MCHC   


 


RDW   


 


Plt Count   


 


MPV   


 


Neut % (Auto)   


 


Lymph % (Auto)   


 


Mono % (Auto)   


 


Eos % (Auto)   


 


Baso % (Auto)   


 


Absolute Neuts (auto)   


 


Absolute Lymphs (auto)   


 


Absolute Monos (auto)   


 


Absolute Eos (auto)   


 


Absolute Basos (auto)   


 


Absolute Nucleated RBC   


 


Nucleated RBC %   


 


Sodium   


 


Potassium   


 


Chloride   


 


Carbon Dioxide   


 


Anion Gap   


 


BUN   


 


Creatinine   


 


Est GFR ( Amer)   


 


Est GFR (Non-Af Amer)   


 


BUN/Creatinine Ratio   


 


Glucose   


 


POC Glucose (mg/dL)  101 H  295 H  173 H


 


Calcium   














  07/08/18 07/08/18 07/08/18





  05:52 05:52 07:40


 


WBC   9.2 


 


RBC   3.44 L 


 


Hgb   10.0 L 


 


Hct   29 L 


 


MCV   84 


 


MCH   29 


 


MCHC   35 


 


RDW   14 


 


Plt Count   304 


 


MPV   7.3 L 


 


Neut % (Auto)   74.9 


 


Lymph % (Auto)   10.8 L 


 


Mono % (Auto)   8.5 H 


 


Eos % (Auto)   5.0 


 


Baso % (Auto)   0.8 


 


Absolute Neuts (auto)   6.9 


 


Absolute Lymphs (auto)   1.0 


 


Absolute Monos (auto)   0.8 


 


Absolute Eos (auto)   0.5 


 


Absolute Basos (auto)   0.1 


 


Absolute Nucleated RBC   0 


 


Nucleated RBC %   0 


 


Sodium  136  


 


Potassium  3.9  


 


Chloride  101  


 


Carbon Dioxide  28  


 


Anion Gap  7  


 


BUN  18  


 


Creatinine  1.01  


 


Est GFR ( Amer)  84.9  


 


Est GFR (Non-Af Amer)  70.2  


 


BUN/Creatinine Ratio  17.8  


 


Glucose  71  


 


POC Glucose (mg/dL)    77


 


Calcium  8.5 L  











Exam: 





GEN: no acute distress. alert and appropriate.


LUNGS: clear bilaterally.


CV: regular rate and rhythm


ABD: + bowel sounds, soft, non-tender, non-distended.


EXT: no edema.


NEURO: left fascial droop and tongue deviates left.  Left motor shows deltoid 4

, biceps 4, triceps 4, ECR 4, finger abd 2,  hip flexion while supine 2+, DF 4. 

Sensation impaired on left.


: penile meatus with some dried blood. urine in sandoval cherry colored.








Assessment/Plan: 


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 PT/OT/SLP.  Appreciate neurology f/u. 

CT Head 7/3/18 showed evolution of prior pontine infarct.


2. Metastatic Prostate Cancer with prostatic bleeding: Sandoval changed to 18Fr 

with 20cc in balloon on 7/5 per Dr. Bacon.  Bleeding less since last 

catheter change but hematuria in sandoval. He needs to be on ASA and Plavix for 

stroke. H/H slightly lower. Continue to monitor and consult with Dr. Bacon 

as needed.


3. Depression: Increased Prozac to 20mg 7/5. Started low dose ritalin 5mg 7/8.


4. DM: Having morning hypoglycemia.  Will decrease glyburide to 5mg qd (home 

dose bid). Continue SSI AC and discontinue bedtime coverage. I d/w him having 

him on oral meds with plans of no insulin at d/c.


5. DVT Prophylaxis: Heparin held due to penile bleeding


6. Advanced Directives: Full code


7. Dysphagia: regular textures, nectar thick liquids. Ice chips between meals.


8. Leukocytosis resolved.  Blood cultures negative. Urine culture negative.  

CXR clear on 7/4/18.  Urinary Tract Infection present on admit s/p 7 days of 

Cipro. 


9. Dehydration: s/p IVF 7/4-7/5. Encourage po fluids.  Cr is good.


10. Constipation: This was probably more the spasms issue yesterday. d/c 

ditropan for bladder spasms around catheter. daily miralax. I d/w nursing 

strategies for giving it to him either sprinkled on applesauce or mixed with 

water after water is thickened. 


11. Estimated LOS: anticipated d/c 7/26. 








07/08/18 10:03

## 2018-07-09 RX ADMIN — LIDOCAINE HYDROCHLORIDE SCH: 20 JELLY TOPICAL at 09:43

## 2018-07-09 RX ADMIN — METHYLPHENIDATE HYDROCHLORIDE SCH MG: 5 TABLET ORAL at 06:27

## 2018-07-09 RX ADMIN — FLUOXETINE SCH MG: 20 CAPSULE ORAL at 09:42

## 2018-07-09 RX ADMIN — LIDOCAINE HYDROCHLORIDE SCH: 20 JELLY TOPICAL at 17:16

## 2018-07-09 RX ADMIN — SENNOSIDES PRN TAB: 8.6 TABLET, FILM COATED ORAL at 22:36

## 2018-07-09 RX ADMIN — DOCUSATE SODIUM SCH MG: 100 CAPSULE, LIQUID FILLED ORAL at 09:43

## 2018-07-09 RX ADMIN — POLYETHYLENE GLYCOL 3350 SCH GM: 17 POWDER, FOR SOLUTION ORAL at 09:42

## 2018-07-09 RX ADMIN — ASPIRIN SCH MG: 325 TABLET, COATED ORAL at 09:42

## 2018-07-09 RX ADMIN — INSULIN LISPRO SCH UNITS: 100 INJECTION, SOLUTION INTRAVENOUS; SUBCUTANEOUS at 12:24

## 2018-07-09 RX ADMIN — CLOPIDOGREL SCH MG: 75 TABLET, FILM COATED ORAL at 09:42

## 2018-07-09 RX ADMIN — LIDOCAINE HYDROCHLORIDE SCH: 20 JELLY TOPICAL at 22:18

## 2018-07-09 RX ADMIN — DOCUSATE SODIUM SCH MG: 100 CAPSULE, LIQUID FILLED ORAL at 22:18

## 2018-07-09 RX ADMIN — GLYBURIDE SCH: 5 TABLET ORAL at 09:43

## 2018-07-09 RX ADMIN — LIDOCAINE HYDROCHLORIDE SCH: 20 JELLY TOPICAL at 12:26

## 2018-07-09 RX ADMIN — ATORVASTATIN CALCIUM SCH MG: 20 TABLET, FILM COATED ORAL at 17:15

## 2018-07-09 RX ADMIN — ACETAMINOPHEN PRN MG: 325 TABLET ORAL at 22:36

## 2018-07-09 RX ADMIN — INSULIN LISPRO SCH: 100 INJECTION, SOLUTION INTRAVENOUS; SUBCUTANEOUS at 07:57

## 2018-07-09 RX ADMIN — INSULIN LISPRO SCH UNITS: 100 INJECTION, SOLUTION INTRAVENOUS; SUBCUTANEOUS at 16:57

## 2018-07-09 NOTE — PN
Progress Note


Date of Service: 07/09/18


Note: 


CAROLYN DEGROOT was visited. Therapy notes read and reviewed. He has better 

movement on the left than when I last saw him. Looks like he may have lost some 

weight. On Prozac/Ritalin. Less penile bleeding








Current Medications: 


 Active Medications











Generic Name Dose Route Start Last Admin





  Trade Name Freq  PRN Reason Stop Dose Admin


 


Acetaminophen  650 mg  07/07/18 19:55  07/07/18 22:04





  Tylenol Tab*  PO   650 mg





  Q6H PRN   Administration





  PAIN   





     





     





     


 


Aspirin  325 mg  06/27/18 09:00  07/09/18 09:42





  Ecotrin Ec Tab*  PO   325 mg





  DAILY CELESTE   Administration





     





     





     





     


 


Atorvastatin Calcium  20 mg  06/26/18 17:00  07/08/18 17:13





  Lipitor*  PO   20 mg





  1700 CELESTE   Administration





     





     





     





     


 


Bisacodyl  10 mg  06/30/18 12:18  06/30/18 16:54





  Dulcolax Supp*  AK   10 mg





  DAILY PRN   Administration





  CONSTIPATION   





     





     





     


 


Clopidogrel Bisulfate  75 mg  06/27/18 09:00  07/09/18 09:42





  Plavix Tab*  PO   75 mg





  DAILY CELESTE   Administration





     





     





     





     


 


Dextrose  12.5 gm  06/26/18 16:10  





  D50w Syringe 50 Ml*  IV PUSH   





  .FOR FS < 60 - SS PRN   





  FS < 60   





     





     





     


 


Docusate Sodium  100 mg  06/26/18 21:00  07/09/18 09:43





  Colace Cap*  PO   100 mg





  BID CELESTE   Administration





     





     





     





     


 


Fluoxetine HCl  20 mg  07/05/18 09:00  07/09/18 09:42





  Prozac Cap*  PO   20 mg





  DAILY CELESTE   Administration





     





     





     





     


 


Glyburide  5 mg  07/09/18 09:00  07/09/18 09:43





  Diabeta Tab*  PO   Not Given





  DAILY WITH MEAL [facility]   





     





     





     





     


 


Insulin Human Lispro  0 - 10 units  07/08/18 11:30  07/09/18 16:57





  Humalog*  SUBCUT   2 units





  AC CELESTE   Administration





     





     





  Protocol   





     


 


Lidocaine HCl  1 applic  07/05/18 17:00  07/09/18 12:26





  Lidocaine 2% Jelly*  TOPICAL   Not Given





  QID CELESTE   





     





     





     





     


 


Magnesium Hydroxide  30 ml  06/26/18 15:53  07/08/18 20:07





  Milk Of Magnesia Liq*  PO   30 ml





  Q6H PRN   Administration





  CONSTIPATION   





     





     





     


 


Methylphenidate HCl  5 mg  07/08/18 07:00  07/09/18 06:27





  Ritalin Tab*  PO   5 mg





  0700 CELESTE   Administration





     





     





     





     


 


Polyethylene Glycol/Electrolytes  17 gm  06/30/18 13:00  07/09/18 09:42





  Miralax*  PO   17 gm





  DAILY CELESTE   Administration





     





     





     





     


 


Senna  2 tab  06/26/18 15:53  06/30/18 20:15





  Senokot Tab*  PO   2 tab





  BEDTIME PRN   Administration





  CONSTIPATION   





     





     





     











Vital Signs: 


 Vital Signs











Temp Pulse Resp BP Pulse Ox


 


 98.4 F   73   20   111/61   98 


 


 07/09/18 16:24  07/09/18 16:24  07/09/18 16:24  07/09/18 16:24  07/09/18 16:24











Lab Results: 


 Laboratory Results - last 24 hr











  07/08/18 07/08/18 07/09/18





  16:51 20:06 07:56


 


POC Glucose (mg/dL)  193 H  273 H  108 H














  07/09/18 07/09/18





  12:07 16:27


 


POC Glucose (mg/dL)  163 H  159 H











Exam: 





GENERAL: no acute distress. alert and appropriate.


LUNGS: clear bilaterally.


HEART: regular rate and rhythm


ABDOMEN: + bowel sounds, soft, non-tender, non-distended.


EXTREMITIES: no edema.


NEUROLOGIC: left facial droop and tongue deviates left.  Left motor shows good 

arm movement,  hip flexion while supine 2+, DF 4. Sensation impaired on left.


: penile meatus with some dried blood. 





Assessment/Plan: 








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 PT/OT/SLP.  CT Head 7/3/18 showed 

evolution of prior pontine infarct.


2. Metastatic Prostate Cancer with prostatic bleeding: Sandoval changed per Dr. Bacon.  Bleeding less since last catheter change but hematuria in sandoval. 


3. Depression: Increased Prozac to 20mg 7/5. Started low dose ritalin 5mg 7/8.


4. DM: Having morning hypoglycemia.  Will decrease glyburide to 5mg qd (home 

dose bid). Continue SSI AC and discontinue bedtime coverage. 


5. DVT Prophylaxis: Heparin held due to penile bleeding


6. Advanced Directives: Full code


7. Dysphagia: regular textures, nectar thick liquids. Ice chips between meals.


8. Constipation: Miralax











07/09/18 17:18

## 2018-07-10 LAB
HCT VFR BLD AUTO: 31 % (ref 42–52)
HGB BLD-MCNC: 10.5 G/DL (ref 14–18)
MCH RBC QN AUTO: 29 PG (ref 27–31)
MCHC RBC AUTO-ENTMCNC: 34 G/DL (ref 31–36)
MCV RBC AUTO: 84 FL (ref 80–94)
PLATELET # BLD AUTO: 305 10^3/UL (ref 150–450)
RBC # BLD AUTO: 3.63 10^6/UL (ref 4–5.4)
WBC # BLD AUTO: 9.5 10^3/UL (ref 3.5–10.8)

## 2018-07-10 RX ADMIN — LIDOCAINE HYDROCHLORIDE SCH: 20 JELLY TOPICAL at 21:47

## 2018-07-10 RX ADMIN — POLYETHYLENE GLYCOL 3350 SCH: 17 POWDER, FOR SOLUTION ORAL at 09:33

## 2018-07-10 RX ADMIN — GLYBURIDE SCH MG: 5 TABLET ORAL at 09:32

## 2018-07-10 RX ADMIN — DOCUSATE SODIUM SCH MG: 100 CAPSULE, LIQUID FILLED ORAL at 20:07

## 2018-07-10 RX ADMIN — GLYBURIDE SCH MG: 2.5 TABLET ORAL at 20:07

## 2018-07-10 RX ADMIN — INSULIN LISPRO SCH UNITS: 100 INJECTION, SOLUTION INTRAVENOUS; SUBCUTANEOUS at 13:33

## 2018-07-10 RX ADMIN — ATORVASTATIN CALCIUM SCH MG: 20 TABLET, FILM COATED ORAL at 17:34

## 2018-07-10 RX ADMIN — HYDROCORTISONE ACETATE PRN MG: 25 SUPPOSITORY RECTAL at 18:51

## 2018-07-10 RX ADMIN — MAGNESIUM HYDROXIDE PRN ML: 400 SUSPENSION ORAL at 16:21

## 2018-07-10 RX ADMIN — DOCUSATE SODIUM SCH MG: 100 CAPSULE, LIQUID FILLED ORAL at 09:32

## 2018-07-10 RX ADMIN — CLOPIDOGREL SCH MG: 75 TABLET, FILM COATED ORAL at 09:32

## 2018-07-10 RX ADMIN — METHYLPHENIDATE HYDROCHLORIDE SCH MG: 5 TABLET ORAL at 07:04

## 2018-07-10 RX ADMIN — FLUOXETINE SCH MG: 20 CAPSULE ORAL at 09:32

## 2018-07-10 RX ADMIN — INSULIN LISPRO SCH: 100 INJECTION, SOLUTION INTRAVENOUS; SUBCUTANEOUS at 08:34

## 2018-07-10 RX ADMIN — LIDOCAINE HYDROCHLORIDE SCH: 20 JELLY TOPICAL at 09:33

## 2018-07-10 RX ADMIN — INSULIN LISPRO SCH UNITS: 100 INJECTION, SOLUTION INTRAVENOUS; SUBCUTANEOUS at 17:34

## 2018-07-10 RX ADMIN — ASPIRIN SCH MG: 325 TABLET, COATED ORAL at 09:32

## 2018-07-10 RX ADMIN — LIDOCAINE HYDROCHLORIDE SCH: 20 JELLY TOPICAL at 17:51

## 2018-07-10 NOTE — PMRUTEAM
PMRU: Team Meeting


Current Status: 


 Nursing: Current Status











Skin Deviations [Bilateral     Other





Buttocks]                      


 


Skin Deviations [Midline Back] Abrasion


 


Skin Deviations [Bilateral     Other





Foot]                          


 


Skin Deviation Description [   red, blanchable





Bilateral Buttocks]            


 


Skin Deviation Description [   healed





Midline Back]                  


 


Skin Deviation Description [   dry skin





Bilateral Foot]                











 Physical Therapy: Current Status











Bed Mobility Assistance        Max Assist


 


Transfer Moblility Assistance  Mod Assist


 


Transfer/Bed Mobility          None





Recommended Devices            


 


Ambulation Assistance          Unable


 


Ambulation Assistive Devices   Rolling Walker


 


Stairs Assistance              Min Assist


 


Stairs Recommended Devices     Two Rails


 


Number of Stairs               8


 


Objective Comments             sitting balance with one hand supported, reaching





 opposite hand outside base of support and across





 body switching between L/R for improved trunk





 control and upright posture














 Occupational Therapy: Current Status











Upper Body Dressing            Min Assist


 


Lower Body Dressing            Total Assist,2 Person Assist


 


Bathing                        Mod Assist


 


Toileting                      Total Assist,2 Person Assist


 


Toilet Transfer                Total Assist,2 Person Assist


 


Toilet Transfer Progress       EZ stand


 


Shower Transfer                Total Assist,2 Person Assist


 


Shower Transfer Progress       w/c to roll in shower


 


Eating                         Supervision


 


Eating Progress                setupA














 Rec Therapy: Current Status











Summary of Assessment and      RT assessment is complete and patient is aware of





Clinical Impression            leisure services. Pt. listens to the radio for





 leisure and has been open to continued leisure





 visits.


 


Treatment Goals                Patient will engage in recreation and leisure





 activities while on the unit


 


Treatment Plan                 Provide and encourage involvement in RT services














 Social Work: Current Status











Discharge Plan                 return home with home care svs and family support


 


Potential for Family Training  pt's son is involved and supportive


 


Anticipated Discharge          Home





Destination                    


 


Discharge With                 home care svs and family support














 Nutrition: Current Status











Monitoring                     Intake improving starting , to % of 

meals





 . Daily BMs noted; liquid stools ; soft/formed





 since . Glycemic control steadily improvin





 -295 past 24 hrs; generally <180. Fasting BG WNL(





 71) today. Visited pt, who reports reduced





 appetite and finds the Glucerna too sweet when he





 drinks it. Discussed options; pt would prefer to





 try some pudding instead, so will d/c Glucerna and





 send Ensure pudding at 3 pm daily. Ensure pudding





 provides 30 g carb per serving, similar to carb





 in 1 serving Glucerna (29 g). Will follow





 acceptance of pudding and for overall improvement





 in intake.











 Speech: Current Status











Assessment                     Patient is progressing slowly as expected


 


Speech Current Status Goal 1   Mild-moderate dysphagia


 


Speech Goal 2 Current Status   Moderate impairment (18)


 


Speech Goal 3 Current Status   Moderate impairment (18)














Goals: 


 Physical Therapy: Updated Goals











Transfer/Bed Mobility          Hand Hold





Recommended Devices            














 Occupational Therapy: Initial Goals











Goals to be Completed in (Days 21-28





)                              


 


Upper Body Bathing Routine     Modified Independent with


 


Lower Body Bathing Routine     Modified Independent with


 


Upper Body Dressing Routine    Modified Independent with


 


Lower Body Dressing Routine    Modified Independent with


 


Toilet Hygeine and Clothing    Modified Independent with





Management Routine             


 


Toilet Transfer Routine        Modified Independent with


 


Tub Transfer Routine           Modified Independent with


 


Functional Transfers for ADL   Modified Independent with


 


Grooming Routine               Independent


 


Feeding Routine                Modified Independent with


 


Light Housekeeping Tasks       Moderate Assist














 Nutrition: Goals











Intervention Goals             1. pt will tolerate least-restrictive diet 

texture





 without evidence difficulty chewing/swallowing





 2. adequate po intake to support stable wt without





 further wt loss





 3. adequate fluid intake to prevent clinical s/sx





 dehydration





 4. adequate glycemic control per inpt parameters;





 no s/sx hypo- or hyperglycemia





 5. pt will avoid grapefruit/grapefruit juice on





 statin therapy











 Speech: Goals











Speech Goal 1                  Swallowing


 


Speech Evaluation Status Goal  Mild-moderate dysphagia





1                              


 


Speech Current Status Goal 1   Mild-moderate dysphagia


 


Goal 1 Comments                Long-Term Goal: Patient will tolerate least





 restrictive diet consistencies w/ no clinical s/s





 aspiration





 Short Term Goals:





 1)  STG: Pt will tolerate nectar thick liquids w/





 no clinical s/s aspiration.





 Status:  Met





 STG: Pt will tolerate thin liquids w/ no clinical





 s/s aspiration.





 Status:  progressing as expected





 SLP instructed patient to verbally recall and





 complete swallowing precautions and exercises.





 SLP provided skilled instuction and minimal





 prompting for patient to use compensatory swallow





 strategies with skilled trials of ice chips:





 patient consumed 5/5 with no coughing, and





 followed Supraglottic swallow technique I'ly after





 verbal review.





 SLP observed to cough during skilled monitoring of





 lunch meal, with nectar coffee. SLP reviewed





 instructions and cued patient to use supraglottic





 breath control and second swallows functionally





 while eating and drinking his breakfast. Patient





 decreased but did not eliminate coughing.





 


 


Speech Goal 2                  Memory


 


Speech Goal 2 Current Status   Moderate impairment (18)


 


Speech Goal 2 Comments         Long-Term Goal: Pt will use compensatory





 strategies to encode and retrieve 4/4 new items or





 compensatory stategy steps after delay of one day





 , Independently, for independence in mobility





 safety, ADLs and community access.





 Short-term Goal:  Pt will use compensatory





 strategies to encode and retrieve 3/4 new items or





 compensatory stategy steps  after delay of 10





 minutes, given Moderate skilled instruction and





 cueing.





 Status:  Progressing as expected.





 


 


Speech Goal 3                  Problem Solving


 


Speech Goal 3 Current Status   Moderate impairment (18)


 


Speech Goal 3 Comments         Long-Term Goal: Pt will use compensatory





 strategies to solve moderately complex routine





 problems, with 100% accuracy, Independently, for





 ADLs such as shopping, time and money management.





 Short-Term Goal: Pt will use compensatory





 strategies to solve moderately complex routine





 problems, with 80% accuracy, given Moderate





 skilled instruction and cueing.





 Status:  Progressing as expected.














 Social Work: Goals











Discharge Plan                 return home with home care svs and family support


 


Potential for Family Training  pt's son is involved and supportive


 


Anticipated Discharge          Home





Destination                    


 


Discharge With                 home care svs and family support

















Care Plan: 


 Care Plan





ADL's - Improve/Maintain                Start:  18 14:48              


Freq:   DAILY                           Status: Active      Target:         


Protocol:                                                                   








Activity Type Activity Date Activity User E-Sign Co-Sign Detail





Recorded Client Recorded Date Recorded By   


 


Document 18 16:05 [id number]   





PMRU-C09 18 16:05 [id number]   














  18





  16:05


 


PMRU Outcome: ADL's/ADL Transfers 


 


Orders/Interventions Occupational





 Therapy





 Evaluation &





 Treatment





 Communication





 Tool in Patient





 Room


 


Device Yes


 


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


 


Patient to receive OT 5x/wk for  Therex





min/day Self Care





 Management





 Group Therapy





 Neuromuscular





 ReEducation


 


UE/LE ADL's with Assist Yes: Raymond


 


ADL Transfers with Assist Yes: Raymond


 


Toileting: Transfers,Clothing Management Yes: Raymond





,Hygeine w/Assist 


 


Light Kitchen/Laundry w/Assist Yes: modA


 


Progression Toward Outcome/Goals Progressing


 


Outcome/Goals Met Pt participated





 fair in ADL





 treatment





 session.





 Continues to c/





 o fatigue with





 ADL treatment





 sessions and





 requires





 encouragement





 for motivation





 to participate





 in ADLs as he





 is able.








Communication-Improve/Maintain          Start:  18 10:06              


Freq:   DAILY                           Status: Active      Target:         


Protocol:                                                                   








Activity Type Activity Date Activity User E-Sign Co-Sign Detail





Recorded Client Recorded Date Recorded By   


 


Document 07/10/18 01:00 [id number]   





PMRU-C03 07/10/18 06:59 [id number]   














  07/10/18





  01:00


 


PMRU Outcome: Communication/Cognitive 





Status 


 


Outcome/Goals Makes Needs





 Known





 Effectively


 


Progression Toward Outcomes/Goals Progressing


 


Outcome/Goals Met Makes Needs





 Known





 Effectively








Coping/Psych-Improve/Maintain           Start:  18 16:22              


Freq:   QSHIFT                          Status: Active      Target:         


Protocol:                                                                   








Activity Type Activity Date Activity User E-Sign Co-Sign Detail





Recorded Client Recorded Date Recorded By   


 


Document 07/10/18 00:00 [id number]   





PMRU-C14 07/10/18 02:04 [id number]   














  07/10/18





  00:00


 


PMRU Outcome: Coping/Psychosocial 


 


Coping Outcome/Goals Verbalization





 of Acceptance





 of Rehab Admit





 Willingness to





 Participate in





 Treatment Plan





 and Basic Needs





 Utilization of





 Available





 Support Systems





 Absence of





 Destructive





 Behavior to





 Self/Others


 


Psychosocial Outcome/Goals Maintain/





 Improve





 Emotional





 Health





 Cooperate/





 Participate in





 Plan


 


Progression Toward Outcome/Goals - Progressing





Coping 


 


Progression Toward Outcome/Goals - Progressing





Psychosocial 


 


Coping Outcome/Goals Met Demonstrates





 Understanding





 of Rehab Admit





 and Goal





 Setting Process


 


Psychosocial Outcome/Goals Met Maintain/





 Improved





 Emotional





 Health





 Cooperate/





 Participated in





 Plan








DVT Prophylaxis- Improve/Maintain       Start:  18 16:22              


Freq:   QSHIFT                          Status: Active      Target:         


Protocol:                                                                   








Activity Type Activity Date Activity User E-Sign Co-Sign Detail





Recorded Client Recorded Date Recorded By   


 


Document 07/10/18 00:00 [id number]   





[id number]-C14 07/10/18 02:04 [id number]   














  07/10/18





  00:00


 


PMRU Outcome: DVT Prophylaxis 


 


Outcome/Goals Remains Free of





 DVT





 Complies with





 DVT Prophylaxis





 /Treatment





 Demonstrates





 Knowledge of





 DVT Prevention/





 Treatment





 TEDS Stockings





 on Every AM,





 Off at HS


 


Progression Toward Outcome/Goals Progressing


 


Outcome/Goals Met Remains Free of





 DVT





 Complies with





 DVT Prophylaxis





 /Treatment





 TEDS Stockings





 on Every AM,





 Off at HS








Discharge Planning - Improve/Maintain   Start:  18 16:22              


Freq:   DAILY@1200                      Status: Active      Target:         


Protocol:                                                                   








Activity Type Activity Date Activity User E-Sign Co-Sign Detail





Recorded Client Recorded Date Recorded By   


 


Document 07/10/18 00:00 [id number]   





PMRU-C14 07/10/18 02:04 [id number]   














  07/10/18





  00:00


 


PMRU Outcome: Discharge Planning 


 


Update Patient Family No


 


Outcome/Goals Demonstrates





 Understanding





 of Discharge





 Plan


 


Progression Toward Outcome/Goals Not Progressing








Education-Improve/Maintain              Start:  18 16:22              


Freq:   QSHIFT                          Status: Active      Target:         


Protocol:                                                                   








Activity Type Activity Date Activity User E-Sign Co-Sign Detail





Recorded Client Recorded Date Recorded By   


 


Document 07/10/18 00:00 [id number]   





PMRU-C14 07/10/18 02:04 [id number]   














  07/10/18





  00:00


 


PMRU Outcome: Education 


 


Outcome/Goals Demonstrates





 Skills





 Encourage





 Questions


 


Progression Toward Outcome/Goals Progressing








/GI-Improve/Maintain                  Start:  18 16:22              


Freq:   QSHIFT                          Status: Active      Target:         


Protocol:                                                                   








Activity Type Activity Date Activity User E-Sign Co-Sign Detail





Recorded Client Recorded Date Recorded By   


 


Document 07/10/18 00:00 [id number]   





PMRU-C14 07/10/18 02:04 [id number]   














  07/10/18





  00:00


 


PMRU Outcome: Genitourinary/ 





Gastrointestinal 


 


Genitourinary- Outcome/Goals Maintain/





 Achieve





 Adequate





 Urinary Output





 Remain Free of





 Hospital-





 Acquired UTI


 


Gastrointestinal-Outcome/Goals Maintain/





 Achieve Bowel





 Regularity in





 Accordance with





 Pt's Baseline





 Prevent





 Constipation





 Laxatives as





 Ordered


 


Progression Toward Outcome/Goals -  Progressing


 


Progression Toward Outcome/Goals - GI Progressing


 


Genitourinary- Outcome/Goals Met Maintain/





 Achieve





 Adequate





 Urinary Output


 


Gastrointestinal-Outcome/Goals Met Maintain/





 Achieve Bowel





 Regularity in





 Accordance with





 Pt's Baseline


 


Outcome/Goals Met Comment sandoval in place








Medication Administration               Start:  18 16:22              


Freq:   QSHIFT                          Status: Active      Target:         


Protocol:                                                                   








Activity Type Activity Date Activity User E-Sign Co-Sign Detail





Recorded Client Recorded Date Recorded By   


 


Document 07/10/18 00:00 [id number]   





PMRU-C14 07/10/18 02:04 [id number]   














  07/10/18





  00:00


 


PMRU Outcome: Medication Administration 


 


Assess Patient Knowledge/Teach Med Yes





Education for all Meds 


 


Outcome/Goals Patient





 Independent





 with Medication





 Administration





 at Home





 Demonstrates





 Understanding


 


Progression Towards Outcome/Goals Progressing


 


Outcome/Goals Met Demonstrates





 Understanding


 


Is Patient Going Home on Lovenox? No








Metabolic Status- Improve/Maintain      Start:  18 16:22              


Freq:   QSHIFT                          Status: Active      Target:         


Protocol:                                                                   








Activity Type Activity Date Activity User E-Sign Co-Sign Detail





Recorded Client Recorded Date Recorded By   


 


Document 07/10/18 00:00 [id number]   





PMRU-C14 07/10/18 02:04 [id number]   














  07/10/18





  00:00


 


PMRU Outcome: Metabolic Status 


 


Have Fingersticks Been Ordered Yes


 


Fingerstick Order Frequency AC & HS


 


Outcome/Goals Maintain/





 Improve





 Metabolic





 Status





 Demonstrate





 Knowledge of





 Prevention/





 Treatment of





 Metabolic





 Imbalances


 


Progression Toward Outcome/Goals Progressing


 


Outcome/Goals Met Comment no coverage for





 bedtime








Mobility- Improve/Maintain              Start:  18 15:55              


Freq:   DAILY                           Status: Active      Target:         


Protocol:                                                                   








Activity Type Activity Date Activity User E-Sign Co-Sign Detail





Recorded Client Recorded Date Recorded By   


 


Document 18 14:50 [id number]   





PMRU-C12 18 14:50 [id number]   














  18





  14:50


 


PMRU Outcome: Mobility 


 


Physical Therapy Evaluation and Yes





Treatment 


 


Activity OOB with Assistance Yes


 


Device Yes


 


Assistance Yes


 


Patient to be seen 5x/wk for  min/ Therex





day for: Mobility





 Training





 Gait Training





 W/C Mobility





 Balance


 


Outcome/Goals Maintain/





 Achieve





 Baseline





 Mobility Status





 Improve





 Mobility Status





 Demonstrates





 Proper Use of





 Assistive





 Devices





 Free from





 Complications





 of Immobility


 


Progression Toward Outcome/Goals Progressing


 


Outcome/Goals Met Improve





 Mobility Status


 


Outcome/Goals Met Comment improved





 ability to





 perform stand





 pivot transfer





 from recliner





 to edge of bed





 with mod A


 


Bed Mobility Yes:





 independent


 


Transfers Yes:





 independent





 with RW


 


Gait x ft Yes: CGA with





 rolling walker





 150'


 


Up/Down Stairs Yes: Min assist





 up/down 8





 stairs with 2





 rails.








Neurological- Improve/Maintain          Start:  18 16:22              


Freq:   QSHIFT                          Status: Active      Target:         


Protocol:                                                                   








Activity Type Activity Date Activity User E-Sign Co-Sign Detail





Recorded Client Recorded Date Recorded By   


 


Document 07/10/18 00:00 [id number]   





PMRU-C14 07/10/18 02:04 [id number]   














  07/10/18





  00:00


 


PMRU Outcome: Neurological 


 


Weakness/Aphasia Weakness





 Left Side


 


Outcome/Goals Maintain/





 Achieve





 Baseline





 Neurological





 Status





 Improve





 Neurological





 Status





 Prevent





 Avoidable





 Neurological





 Decline





 Maintain/





 Improve





 Strength/ROM


 


Progression Toward Outcome/Goals Progressing








Nutrition-Improve/Maintain              Start:  18 18:14              


Freq:   DAILY@0400,1600                 Status: Active      Target:         


Protocol:                                                                   








Activity Type Activity Date Activity User E-Sign Co-Sign Detail





Recorded Client Recorded Date Recorded By   


 


Document 07/10/18 04:00 [id number]   





PMRU-C03 07/10/18 07:01 [id number]   














  07/10/18





  04:00


 


Outcome: Nutrition 


 


Outcome/Goals Demonstrates





 Adequate





 Hydration





 Maintain/





 Improve





 Nutritional





 Status


 


Progression Toward Outcome/Goals Not Progressing








Nutrition/Swallowing- Improve/Maintain  Start:  18 10:07              


Freq:   QSHIFT                          Status: Active      Target:         


Protocol:                                                                   








Activity Type Activity Date Activity User E-Sign Co-Sign Detail





Recorded Client Recorded Date Recorded By   


 


Document 07/10/18 00:00 [id number]   





PMRU-C14 07/10/18 02:04 [id number]   














  07/10/18





  00:00


 


PMRU Outcome: Nutrition/Swallowing 


 


Outcome/Goals Maintain/





 Improve





 Nutritional





 Status


 


Progression Toward Outcome/Goals Progressing


 


Outcome/Goals Met Comment medication in





 applesauce








Safety- Improve/Maintain                Start:  18 16:22              


Freq:   QSHIFT                          Status: Active      Target:         


Protocol:                                                                   








Activity Type Activity Date Activity User E-Sign Co-Sign Detail





Recorded Client Recorded Date Recorded By   


 


Document 07/10/18 00:00 [id number]   





PMRU-C14 07/10/18 02:04 [id number]   














  07/10/18





  00:00


 


PMRU Outcome: Safety 


 


Outcome/Goals Remain Free of





 Injury or Harm





 Cooperates with





 Safety





 Measures for





 Least





 Restrictive





 Environment





 Prevent Falls/





 Injury





 Equipment





 Needed


 


Progression Toward Outcome/Goals Progressing


 


Outcome/Goals Met Remain Free of





 Injury or Harm





 Cooperates with





 Safety





 Measures for





 Least





 Restrictive





 Environment





 Prevent Falls/





 Injury


 


Outcome/Goals Met Comment PA in place; pt





 rings





 appropriately








Skin- Improve/Maintain                  Start:  18 16:22              


Freq:   QSHIFT                          Status: Active      Target:         


Protocol:                                                                   








Activity Type Activity Date Activity User E-Sign Co-Sign Detail





Recorded Client Recorded Date Recorded By   


 


Document 07/10/18 00:00 [id number]   





PMRU-C14 07/10/18 02:04 [id number]   














  07/10/18





  00:00


 


PMRU Outcome: Skin 


 


Skin Risk Level Medium


 


Skin Orders Air Mattress





 Turn/Position





 q2hr While in





 Bed


 


Outcome/Goals Maintain/





 Improve Skin





 Intergrity





 Free from





 Decubitus


 


Progression Toward Outcome/Goals Progressing


 


Outcome/Goals Met Comment cleaned myrna





 area














Medicine Note: 








Length of Stay:  2 1/2 weeks





Anticipated Discharge Destination: Home





Tentative Discharge Date:  18





Discharged to:  Home with son

## 2018-07-10 NOTE — PN
Progress Note


Date of Service: 07/10/18


Note: 


CAROLYN DEGROOT was visited. Therapy notes read and reviewed. He had a near 

syncopal event early this morning after straining to have a BM. A CAT team was 

called. Labs unrevealing. I have ordered anusol and laxatives. 








Current Medications: 


 Active Medications











Generic Name Dose Route Start Last Admin





  Trade Name Freq  PRN Reason Stop Dose Admin


 


Acetaminophen  650 mg  07/07/18 19:55  07/09/18 22:36





  Tylenol Tab*  PO   650 mg





  Q6H PRN   Administration





  PAIN   





     





     





     


 


Aspirin  325 mg  06/27/18 09:00  07/10/18 09:32





  Ecotrin Ec Tab*  PO   325 mg





  DAILY CELESTE   Administration





     





     





     





     


 


Atorvastatin Calcium  20 mg  06/26/18 17:00  07/09/18 17:15





  Lipitor*  PO   20 mg





  1700 CELESTE   Administration





     





     





     





     


 


Bisacodyl  10 mg  06/30/18 12:18  06/30/18 16:54





  Dulcolax Supp*  MO   10 mg





  DAILY PRN   Administration





  CONSTIPATION   





     





     





     


 


Clopidogrel Bisulfate  75 mg  06/27/18 09:00  07/10/18 09:32





  Plavix Tab*  PO   75 mg





  DAILY CELESTE   Administration





     





     





     





     


 


Dextrose  12.5 gm  06/26/18 16:10  





  D50w Syringe 50 Ml*  IV PUSH   





  .FOR FS < 60 - SS PRN   





  FS < 60   





     





     





     


 


Docusate Sodium  100 mg  06/26/18 21:00  07/10/18 09:32





  Colace Cap*  PO   100 mg





  BID CELESTE   Administration





     





     





     





     


 


Fluoxetine HCl  20 mg  07/05/18 09:00  07/10/18 09:32





  Prozac Cap*  PO   20 mg





  DAILY CELESTE   Administration





     





     





     





     


 


Glyburide  5 mg  07/09/18 09:00  07/10/18 09:32





  Diabeta Tab*  PO   5 mg





  DAILY WITH MEAL CELESTE   Administration





     





     





     





     


 


Hydrocortisone  25 mg  07/10/18 13:25  





  Anusol Hc Supp*  MO   





  BID PRN   





  hemorrhoids   





     





     





     


 


Insulin Human Lispro  0 - 10 units  07/08/18 11:30  07/10/18 13:33





  Humalog*  SUBCUT   6 units





  AC CELESTE   Administration





     





     





  Protocol   





     


 


Lidocaine HCl  1 applic  07/05/18 17:00  07/10/18 09:33





  Lidocaine 2% Jelly*  TOPICAL   Not Given





  QID CELESTE   





     





     





     





     


 


Magnesium Hydroxide  30 ml  06/26/18 15:53  07/08/18 20:07





  Milk Of Magnesia Liq*  PO   30 ml





  Q6H PRN   Administration





  CONSTIPATION   





     





     





     


 


Methylphenidate HCl  2.5 mg  07/11/18 07:00  





  Ritalin Tab*  PO   





  0700,1100 CELESTE   





     





     





     





     


 


Polyethylene Glycol/Electrolytes  17 gm  06/30/18 13:00  07/10/18 09:33





  Miralax*  PO   Not Given





  DAILY CELESTE   





     





     





     





     


 


Senna  2 tab  06/26/18 15:53  07/09/18 22:36





  Senokot Tab*  PO   2 tab





  BEDTIME PRN   Administration





  CONSTIPATION   





     





     





     











Vital Signs: 


 Vital Signs











Temp Pulse Resp BP Pulse Ox


 


 98.1 F   78   20   121/71   98 


 


 07/10/18 16:23  07/10/18 16:23  07/10/18 16:23  07/10/18 16:23  07/10/18 16:23











Lab Results: 


 Laboratory Results - last 24 hr











  07/09/18 07/10/18 07/10/18





  20:11 06:15 06:15


 


WBC   9.5 


 


RBC   3.63 L 


 


Hgb   10.5 L 


 


Hct   31 L 


 


MCV   84 


 


MCH   29 


 


MCHC   34 


 


RDW   14 


 


Plt Count   305 


 


MPV   7.3 L 


 


Sodium    132 L


 


Potassium    4.2


 


Chloride    99 L


 


Carbon Dioxide    24


 


Anion Gap    9


 


BUN    18


 


Creatinine    1.01


 


Est GFR ( Amer)    84.9


 


Est GFR (Non-Af Amer)    70.2


 


BUN/Creatinine Ratio    17.8


 


Glucose    131 H


 


POC Glucose (mg/dL)  193 H  


 


Calcium    8.6














  07/10/18 07/10/18





  12:08 16:25


 


WBC  


 


RBC  


 


Hgb  


 


Hct  


 


MCV  


 


MCH  


 


MCHC  


 


RDW  


 


Plt Count  


 


MPV  


 


Sodium  


 


Potassium  


 


Chloride  


 


Carbon Dioxide  


 


Anion Gap  


 


BUN  


 


Creatinine  


 


Est GFR ( Amer)  


 


Est GFR (Non-Af Amer)  


 


BUN/Creatinine Ratio  


 


Glucose  


 


POC Glucose (mg/dL)  300 H  206 H


 


Calcium  











Exam: 





GENERAL: no acute distress. alert and appropriate.


LUNGS: clear bilaterally.


HEART: regular rate and rhythm


ABDOMEN: + bowel sounds, soft, non-tender, non-distended.


EXTREMITIES: no edema.


NEUROLOGIC: left facial droop and tongue deviates left.  Left motor shows good 

arm movement,  hip flexion while supine 2+, DF 4. Sensation impaired on left.


: penile meatus with some clots. 


Assessment/Plan: 








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 PT/OT/SLP.  CT Head 7/3/18 showed 

evolution of prior pontine infarct.


2. Metastatic Prostate Cancer with prostatic bleeding: Sandoval changed per Dr. Bacon.  Bleeding less since last catheter change but hematuria in sandoval. 


3. Depression: Increased Prozac to 20mg 7/5. Adjusted low dose ritalin to 2.5mg 

0700,1100.


4. DM: Blood sugars rising. Will increase glyburide to 5mg am and 2.5 mg pm (

home dose 5 bid). Continue SSI AC and discontinue bedtime coverage. 


5. DVT Prophylaxis: Heparin held due to penile bleeding


6. Advanced Directives: Full code


7. Dysphagia: regular textures, nectar thick liquids. Ice chips between meals.


8. Constipation: Miralax


9. Near syncope: Follow. 

















07/10/18 17:07

## 2018-07-10 NOTE — CONSULT
Consult


Consult: 


CAT response





CAT called ~0555. Patient up to bedside commode attempting bowel movement when 

he became pale & syncopized. Primary nurse called a CAT and sternally rubbed 

him with good response. He quickly aroused and demanded she stop. He does not 

recall the episode and in fact denies it. He denies any symptoms, specifically 

chest pain, SOB, palpitations, light-headedness, or other issues. Vitals reveal 

systolic in the 130s, pulse 80s, saO2 mid-90s, & RR in the high teens. He is 

monitored at the bedside & answers questions appropriately. Will check CBC & 

BMP. No further evaluation or follow up needed. Dr Mota, physiatrist 

apprised by SAL Friedman NP.

## 2018-07-11 LAB
BASOPHILS # BLD AUTO: 0 10^3/UL (ref 0–0.2)
EOSINOPHIL # BLD AUTO: 0.3 10^3/UL (ref 0–0.6)
HCT VFR BLD AUTO: 29 % (ref 42–52)
HGB BLD-MCNC: 9.9 G/DL (ref 14–18)
LYMPHOCYTES # BLD AUTO: 1 10^3/UL (ref 1–4.8)
MCH RBC QN AUTO: 29 PG (ref 27–31)
MCHC RBC AUTO-ENTMCNC: 34 G/DL (ref 31–36)
MCV RBC AUTO: 83 FL (ref 80–94)
MONOCYTES # BLD AUTO: 0.9 10^3/UL (ref 0–0.8)
NEUTROPHILS # BLD AUTO: 7.3 10^3/UL (ref 1.5–7.7)
NRBC # BLD AUTO: 0 10^3/UL
NRBC BLD QL AUTO: 0
PLATELET # BLD AUTO: 275 10^3/UL (ref 150–450)
RBC # BLD AUTO: 3.46 10^6/UL (ref 4–5.4)
WBC # BLD AUTO: 9.6 10^3/UL (ref 3.5–10.8)

## 2018-07-11 RX ADMIN — MAGNESIUM HYDROXIDE PRN ML: 400 SUSPENSION ORAL at 16:50

## 2018-07-11 RX ADMIN — INSULIN LISPRO SCH UNITS: 100 INJECTION, SOLUTION INTRAVENOUS; SUBCUTANEOUS at 12:36

## 2018-07-11 RX ADMIN — DOCUSATE SODIUM SCH MG: 100 CAPSULE, LIQUID FILLED ORAL at 19:59

## 2018-07-11 RX ADMIN — INSULIN LISPRO SCH UNITS: 100 INJECTION, SOLUTION INTRAVENOUS; SUBCUTANEOUS at 16:57

## 2018-07-11 RX ADMIN — POLYETHYLENE GLYCOL 3350 SCH GM: 17 POWDER, FOR SOLUTION ORAL at 09:49

## 2018-07-11 RX ADMIN — LIDOCAINE HYDROCHLORIDE SCH: 20 JELLY TOPICAL at 12:38

## 2018-07-11 RX ADMIN — BISACODYL PRN MG: 10 SUPPOSITORY RECTAL at 19:59

## 2018-07-11 RX ADMIN — LIDOCAINE HYDROCHLORIDE SCH: 20 JELLY TOPICAL at 20:26

## 2018-07-11 RX ADMIN — METHYLPHENIDATE HYDROCHLORIDE SCH MG: 5 TABLET ORAL at 12:19

## 2018-07-11 RX ADMIN — LIDOCAINE HYDROCHLORIDE SCH: 20 JELLY TOPICAL at 17:06

## 2018-07-11 RX ADMIN — FLUOXETINE SCH MG: 20 CAPSULE ORAL at 09:49

## 2018-07-11 RX ADMIN — ATORVASTATIN CALCIUM SCH MG: 20 TABLET, FILM COATED ORAL at 17:06

## 2018-07-11 RX ADMIN — GLYBURIDE SCH MG: 2.5 TABLET ORAL at 19:59

## 2018-07-11 RX ADMIN — CLOPIDOGREL SCH MG: 75 TABLET, FILM COATED ORAL at 09:49

## 2018-07-11 RX ADMIN — GLYBURIDE SCH MG: 5 TABLET ORAL at 09:49

## 2018-07-11 RX ADMIN — ASPIRIN SCH MG: 325 TABLET, COATED ORAL at 09:49

## 2018-07-11 RX ADMIN — DOCUSATE SODIUM SCH MG: 100 CAPSULE, LIQUID FILLED ORAL at 09:49

## 2018-07-11 RX ADMIN — LIDOCAINE HYDROCHLORIDE SCH: 20 JELLY TOPICAL at 10:13

## 2018-07-11 RX ADMIN — INSULIN LISPRO SCH: 100 INJECTION, SOLUTION INTRAVENOUS; SUBCUTANEOUS at 07:37

## 2018-07-11 RX ADMIN — METHYLPHENIDATE HYDROCHLORIDE SCH MG: 5 TABLET ORAL at 07:34

## 2018-07-11 NOTE — PN
Progress Note


Date of Service: 07/11/18


Note: 


CAROLYN DEGROOT was visited. Therapy notes read and reviewed. He feels like he is 

making gains. Notes he is tired. Enjoying the ability to drink free H2O








Current Medications: 


 Active Medications











Generic Name Dose Route Start Last Admin





  Trade Name Freq  PRN Reason Stop Dose Admin


 


Acetaminophen  650 mg  07/07/18 19:55  07/09/18 22:36





  Tylenol Tab*  PO   650 mg





  Q6H PRN   Administration





  PAIN   





     





     





     


 


Aspirin  325 mg  06/27/18 09:00  07/11/18 09:49





  Ecotrin Ec Tab*  PO   325 mg





  DAILY CELESTE   Administration





     





     





     





     


 


Atorvastatin Calcium  20 mg  06/26/18 17:00  07/11/18 17:06





  Lipitor*  PO   20 mg





  1700 CELESTE   Administration





     





     





     





     


 


Bisacodyl  10 mg  06/30/18 12:18  06/30/18 16:54





  Dulcolax Supp*  GA   10 mg





  DAILY PRN   Administration





  CONSTIPATION   





     





     





     


 


Clopidogrel Bisulfate  75 mg  06/27/18 09:00  07/11/18 09:49





  Plavix Tab*  PO   75 mg





  DAILY CELESTE   Administration





     





     





     





     


 


Dextrose  12.5 gm  06/26/18 16:10  





  D50w Syringe 50 Ml*  IV PUSH   





  .FOR FS < 60 - SS PRN   





  FS < 60   





     





     





     


 


Docusate Sodium  100 mg  06/26/18 21:00  07/11/18 09:49





  Colace Cap*  PO   100 mg





  BID CELESTE   Administration





     





     





     





     


 


Fluoxetine HCl  20 mg  07/05/18 09:00  07/11/18 09:49





  Prozac Cap*  PO   20 mg





  DAILY CELESTE   Administration





     





     





     





     


 


Glyburide  5 mg  07/09/18 09:00  07/11/18 09:49





  Diabeta Tab*  PO   5 mg





  DAILY WITH MEAL CELESTE   Administration





     





     





     





     


 


Glyburide  2.5 mg  07/10/18 21:00  07/10/18 20:07





  Diabeta Tab*  PO   2.5 mg





  BEDTIME CELESTE   Administration





     





     





     





     


 


Hydrocortisone  25 mg  07/10/18 13:25  07/10/18 18:51





  Anusol Hc Supp*  GA   25 mg





  BID PRN   Administration





  hemorrhoids   





     





     





     


 


Insulin Human Lispro  0 - 10 units  07/08/18 11:30  07/11/18 16:57





  Humalog*  SUBCUT   4 units





  AC CELESTE   Administration





     





     





  Protocol   





     


 


Lidocaine HCl  1 applic  07/05/18 17:00  07/11/18 17:06





  Lidocaine 2% Jelly*  TOPICAL   Not Given





  QID CELESTE   





     





     





     





     


 


Magnesium Hydroxide  30 ml  06/26/18 15:53  07/11/18 16:50





  Milk Of Magnesia Liq*  PO   30 ml





  Q6H PRN   Administration





  CONSTIPATION   





     





     





     


 


Methylphenidate HCl  2.5 mg  07/11/18 07:00  07/11/18 12:19





  Ritalin Tab*  PO   2.5 mg





  0700,1100 CELESTE   Administration





     





     





     





     


 


Polyethylene Glycol/Electrolytes  17 gm  06/30/18 13:00  07/11/18 09:49





  Miralax*  PO   17 gm





  DAILY CELESTE   Administration





     





     





     





     


 


Senna  2 tab  06/26/18 15:53  07/09/18 22:36





  Senokot Tab*  PO   2 tab





  BEDTIME PRN   Administration





  CONSTIPATION   





     





     





     











Vital Signs: 


 Vital Signs











Temp Pulse Resp BP Pulse Ox


 


 98.5 F   79   18   117/52   96 


 


 07/11/18 16:36  07/11/18 16:36  07/11/18 17:28  07/11/18 16:36  07/11/18 17:28











Lab Results: 


 Laboratory Results - last 24 hr











  07/10/18 07/11/18 07/11/18





  20:07 06:49 06:49


 


WBC   9.6 


 


RBC   3.46 L 


 


Hgb   9.9 L 


 


Hct   29 L 


 


MCV   83 


 


MCH   29 


 


MCHC   34 


 


RDW   15 


 


Plt Count   275 


 


MPV   7.5 


 


Neut % (Auto)   76.1 


 


Lymph % (Auto)   10.0 L 


 


Mono % (Auto)   9.8 H 


 


Eos % (Auto)   3.6 


 


Baso % (Auto)   0.5 


 


Absolute Neuts (auto)   7.3 


 


Absolute Lymphs (auto)   1.0 


 


Absolute Monos (auto)   0.9 H 


 


Absolute Eos (auto)   0.3 


 


Absolute Basos (auto)   0 


 


Absolute Nucleated RBC   0 


 


Nucleated RBC %   0 


 


Sodium    133 L


 


Potassium    4.2


 


Chloride    98 L


 


Carbon Dioxide    28


 


Anion Gap    7


 


BUN    19


 


Creatinine    1.05


 


Est GFR ( Amer)    81.2


 


Est GFR (Non-Af Amer)    67.1


 


BUN/Creatinine Ratio    18.1


 


Glucose    107 H


 


POC Glucose (mg/dL)  245 H  


 


Calcium    8.4 L


 


Total Bilirubin    0.40


 


AST    11 L


 


ALT    6 L


 


Alkaline Phosphatase    78


 


Total Protein    6.0 L


 


Albumin    2.6 L


 


Globulin    3.4


 


Albumin/Globulin Ratio    0.8 L














  07/11/18 07/11/18 07/11/18





  07:33 12:03 16:36


 


WBC   


 


RBC   


 


Hgb   


 


Hct   


 


MCV   


 


MCH   


 


MCHC   


 


RDW   


 


Plt Count   


 


MPV   


 


Neut % (Auto)   


 


Lymph % (Auto)   


 


Mono % (Auto)   


 


Eos % (Auto)   


 


Baso % (Auto)   


 


Absolute Neuts (auto)   


 


Absolute Lymphs (auto)   


 


Absolute Monos (auto)   


 


Absolute Eos (auto)   


 


Absolute Basos (auto)   


 


Absolute Nucleated RBC   


 


Nucleated RBC %   


 


Sodium   


 


Potassium   


 


Chloride   


 


Carbon Dioxide   


 


Anion Gap   


 


BUN   


 


Creatinine   


 


Est GFR ( Amer)   


 


Est GFR (Non-Af Amer)   


 


BUN/Creatinine Ratio   


 


Glucose   


 


POC Glucose (mg/dL)  122 H  248 H  232 H


 


Calcium   


 


Total Bilirubin   


 


AST   


 


ALT   


 


Alkaline Phosphatase   


 


Total Protein   


 


Albumin   


 


Globulin   


 


Albumin/Globulin Ratio   











Exam: 





GENERAL: no acute distress. alert and appropriate.


LUNGS: clear bilaterally.


HEART: regular rate and rhythm


ABDOMEN: + bowel sounds, soft, non-tender, non-distended.


EXTREMITIES: no edema.


NEUROLOGIC: left facial droop and tongue deviates left.  Left motor shows good 

arm movement,  hip flexion while supine 2+, DF 4. Sensation impaired on left.


: penile meatus with some clots. 


Assessment/Plan: 








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 PT/OT/SLP.  CT Head 7/3/18 showed 

evolution of prior pontine infarct.


2. Metastatic Prostate Cancer with prostatic bleeding: Sandoval changed per Dr. Bacon.  Bleeding less since last catheter change but hematuria in sandoval. 


3. Depression: Increased Prozac to 20mg July 5. Adjusted low dose ritalin to 

2.5mg 0700,1100.


4. DM: Blood sugars rising. Now on glyburide 5mg am and 2.5 mg pm (home dose 5 

bid). Continue SSI AC and discontinue bedtime coverage. 


5. DVT Prophylaxis: Heparin held due to penile bleeding


6. Advanced Directives: Full code


7. Dysphagia: regular textures, nectar thick liquids. Free water between meals


8. Constipation: Miralax


9. Near syncope: Follow. 

















07/11/18 19:34

## 2018-07-12 RX ADMIN — ATORVASTATIN CALCIUM SCH MG: 20 TABLET, FILM COATED ORAL at 17:43

## 2018-07-12 RX ADMIN — INSULIN LISPRO SCH: 100 INJECTION, SOLUTION INTRAVENOUS; SUBCUTANEOUS at 09:45

## 2018-07-12 RX ADMIN — METHYLPHENIDATE HYDROCHLORIDE SCH MG: 5 TABLET ORAL at 06:21

## 2018-07-12 RX ADMIN — HYDROCORTISONE ACETATE PRN MG: 25 SUPPOSITORY RECTAL at 23:54

## 2018-07-12 RX ADMIN — ASPIRIN SCH MG: 325 TABLET, COATED ORAL at 09:55

## 2018-07-12 RX ADMIN — LIDOCAINE HYDROCHLORIDE SCH: 20 JELLY TOPICAL at 09:58

## 2018-07-12 RX ADMIN — INSULIN LISPRO SCH UNITS: 100 INJECTION, SOLUTION INTRAVENOUS; SUBCUTANEOUS at 12:15

## 2018-07-12 RX ADMIN — INSULIN LISPRO SCH UNITS: 100 INJECTION, SOLUTION INTRAVENOUS; SUBCUTANEOUS at 17:42

## 2018-07-12 RX ADMIN — DOCUSATE SODIUM SCH MG: 100 CAPSULE, LIQUID FILLED ORAL at 09:55

## 2018-07-12 RX ADMIN — LIDOCAINE HYDROCHLORIDE SCH: 20 JELLY TOPICAL at 17:50

## 2018-07-12 RX ADMIN — GLYBURIDE SCH MG: 2.5 TABLET ORAL at 20:07

## 2018-07-12 RX ADMIN — LIDOCAINE HYDROCHLORIDE SCH: 20 JELLY TOPICAL at 20:07

## 2018-07-12 RX ADMIN — LIDOCAINE HYDROCHLORIDE SCH: 20 JELLY TOPICAL at 13:57

## 2018-07-12 RX ADMIN — METHYLPHENIDATE HYDROCHLORIDE SCH MG: 5 TABLET ORAL at 12:15

## 2018-07-12 RX ADMIN — HYDROCORTISONE ACETATE PRN MG: 25 SUPPOSITORY RECTAL at 13:47

## 2018-07-12 RX ADMIN — POLYETHYLENE GLYCOL 3350 SCH GM: 17 POWDER, FOR SOLUTION ORAL at 09:56

## 2018-07-12 RX ADMIN — CLOPIDOGREL SCH MG: 75 TABLET, FILM COATED ORAL at 09:55

## 2018-07-12 RX ADMIN — FLUOXETINE SCH MG: 20 CAPSULE ORAL at 09:55

## 2018-07-12 RX ADMIN — GLYBURIDE SCH MG: 5 TABLET ORAL at 10:13

## 2018-07-12 RX ADMIN — DOCUSATE SODIUM SCH MG: 100 CAPSULE, LIQUID FILLED ORAL at 20:07

## 2018-07-12 NOTE — PN
Progress Note


Date of Service: 07/12/18


Note: 


CAROLYN DEGROOT was visited. Therapy notes read and reviewed. Was incontinent of 

stool. Perhaps he is getting too much Miralax. Tuesday had near syncope 

straining to have a BM








Current Medications: 


 Active Medications











Generic Name Dose Route Start Last Admin





  Trade Name Freq  PRN Reason Stop Dose Admin


 


Acetaminophen  650 mg  07/07/18 19:55  07/09/18 22:36





  Tylenol Tab*  PO   650 mg





  Q6H PRN   Administration





  PAIN   





     





     





     


 


Aspirin  325 mg  06/27/18 09:00  07/12/18 09:55





  Ecotrin Ec Tab*  PO   325 mg





  DAILY CELESTE   Administration





     





     





     





     


 


Atorvastatin Calcium  20 mg  06/26/18 17:00  07/11/18 17:06





  Lipitor*  PO   20 mg





  1700 CELESTE   Administration





     





     





     





     


 


Bisacodyl  10 mg  06/30/18 12:18  07/11/18 19:59





  Dulcolax Supp*  CO   10 mg





  DAILY PRN   Administration





  CONSTIPATION   





     





     





     


 


Clopidogrel Bisulfate  75 mg  06/27/18 09:00  07/12/18 09:55





  Plavix Tab*  PO   75 mg





  DAILY CELESTE   Administration





     





     





     





     


 


Dextrose  12.5 gm  06/26/18 16:10  





  D50w Syringe 50 Ml*  IV PUSH   





  .FOR FS < 60 - SS PRN   





  FS < 60   





     





     





     


 


Docusate Sodium  100 mg  06/26/18 21:00  07/12/18 09:55





  Colace Cap*  PO   100 mg





  BID CELESTE   Administration





     





     





     





     


 


Fluoxetine HCl  20 mg  07/05/18 09:00  07/12/18 09:55





  Prozac Cap*  PO   20 mg





  DAILY CELESTE   Administration





     





     





     





     


 


Glyburide  5 mg  07/09/18 09:00  07/12/18 10:13





  Diabeta Tab*  PO   5 mg





  DAILY WITH MEAL CELESTE   Administration





     





     





     





     


 


Glyburide  2.5 mg  07/10/18 21:00  07/11/18 19:59





  Diabeta Tab*  PO   2.5 mg





  BEDTIME CELESTE   Administration





     





     





     





     


 


Hydrocortisone  25 mg  07/10/18 13:25  07/12/18 13:47





  Anusol Hc Supp*  CO   25 mg





  BID PRN   Administration





  hemorrhoids   





     





     





     


 


Insulin Human Lispro  0 - 10 units  07/08/18 11:30  07/12/18 12:15





  Humalog*  SUBCUT   4 units





  AC CELESTE   Administration





     





     





  Protocol   





     


 


Lidocaine HCl  1 applic  07/05/18 17:00  07/12/18 13:57





  Lidocaine 2% Jelly*  TOPICAL   Not Given





  QID CELESTE   





     





     





     





     


 


Magnesium Hydroxide  30 ml  06/26/18 15:53  07/11/18 16:50





  Milk Of Magnesia Liq*  PO   30 ml





  Q6H PRN   Administration





  CONSTIPATION   





     





     





     


 


Methylphenidate HCl  2.5 mg  07/11/18 07:00  07/12/18 12:15





  Ritalin Tab*  PO   2.5 mg





  0700,1100 CELESTE   Administration





     





     





     





     


 


Polyethylene Glycol/Electrolytes  17 gm  06/30/18 13:00  07/12/18 09:56





  Miralax*  PO   17 gm





  DAILY CELESTE   Administration





     





     





     





     


 


Senna  2 tab  06/26/18 15:53  07/09/18 22:36





  Senokot Tab*  PO   2 tab





  BEDTIME PRN   Administration





  CONSTIPATION   





     





     





     











Vital Signs: 


 Vital Signs











Temp Pulse Resp BP Pulse Ox


 


 98.1 F   82   18   118/55   100 


 


 07/12/18 15:33  07/12/18 15:33  07/12/18 15:33  07/12/18 15:33  07/12/18 15:33











Lab Results: 


 Laboratory Results - last 24 hr











  07/11/18 07/12/18 07/12/18





  19:59 07:23 11:31


 


POC Glucose (mg/dL)  203 H  89  241 H











Exam: 





GENERAL: no acute distress. alert and appropriate.


LUNGS: clear bilaterally.


HEART: regular rate and rhythm


ABDOMEN: + bowel sounds, soft, non-tender, non-distended.


EXTREMITIES: no edema.


NEUROLOGIC: left facial droop and tongue deviates left.  Left motor shows good 

arm movement,  hip flexion while supine 2+, DF 4. Sensation impaired on left.


: penile meatus with some clots. 


Assessment/Plan: 








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 PT/OT/SLP.  


2. Metastatic Prostate Cancer with prostatic bleeding: Sandoval changed per Dr. Bacon.  Bleeding less since last catheter change but hematuria in sandoval. 


3. Depression:  Prozac 20mg daily. Ritalin to 2.5mg 0700,1100.


4. DM: Blood sugars rising. Now on glyburide 5mg am and 2.5 mg pm (home dose 5 

bid). Continue SSI AC and discontinue bedtime coverage. 


5. DVT Prophylaxis: Heparin held due to penile bleeding


6. Advanced Directives: Full code


7. Dysphagia: regular textures, nectar thick liquids. Free water between meals


8. Constipation: Miralax. Will follow


9. Near syncope: Follow. 




















07/12/18 17:41

## 2018-07-13 RX ADMIN — INSULIN LISPRO SCH UNITS: 100 INJECTION, SOLUTION INTRAVENOUS; SUBCUTANEOUS at 12:46

## 2018-07-13 RX ADMIN — LIDOCAINE HYDROCHLORIDE SCH: 20 JELLY TOPICAL at 09:49

## 2018-07-13 RX ADMIN — GLYBURIDE SCH MG: 2.5 TABLET ORAL at 21:23

## 2018-07-13 RX ADMIN — FLUOXETINE SCH MG: 20 CAPSULE ORAL at 09:42

## 2018-07-13 RX ADMIN — GLYBURIDE SCH MG: 5 TABLET ORAL at 09:42

## 2018-07-13 RX ADMIN — POLYETHYLENE GLYCOL 3350 SCH GM: 17 POWDER, FOR SOLUTION ORAL at 09:42

## 2018-07-13 RX ADMIN — DOCUSATE SODIUM SCH MG: 100 CAPSULE, LIQUID FILLED ORAL at 09:42

## 2018-07-13 RX ADMIN — INSULIN LISPRO SCH UNITS: 100 INJECTION, SOLUTION INTRAVENOUS; SUBCUTANEOUS at 17:00

## 2018-07-13 RX ADMIN — LIDOCAINE HYDROCHLORIDE SCH: 20 JELLY TOPICAL at 21:25

## 2018-07-13 RX ADMIN — METHYLPHENIDATE HYDROCHLORIDE SCH MG: 5 TABLET ORAL at 12:45

## 2018-07-13 RX ADMIN — CLOPIDOGREL SCH MG: 75 TABLET, FILM COATED ORAL at 09:42

## 2018-07-13 RX ADMIN — LIDOCAINE HYDROCHLORIDE SCH: 20 JELLY TOPICAL at 15:46

## 2018-07-13 RX ADMIN — INSULIN LISPRO SCH: 100 INJECTION, SOLUTION INTRAVENOUS; SUBCUTANEOUS at 10:15

## 2018-07-13 RX ADMIN — ASPIRIN SCH MG: 325 TABLET, COATED ORAL at 09:42

## 2018-07-13 RX ADMIN — ATORVASTATIN CALCIUM SCH MG: 20 TABLET, FILM COATED ORAL at 17:21

## 2018-07-13 RX ADMIN — METHYLPHENIDATE HYDROCHLORIDE SCH MG: 5 TABLET ORAL at 05:52

## 2018-07-13 RX ADMIN — DOCUSATE SODIUM SCH MG: 100 CAPSULE, LIQUID FILLED ORAL at 21:23

## 2018-07-13 NOTE — PN
Progress Note


Date of Service: 07/13/18


Note: 


CAROLYN DEGROOT was visited. Therapy notes read and reviewed. He is moving his 

left side better. Remains a little flat tonight. 








Current Medications: 


 Active Medications











Generic Name Dose Route Start Last Admin





  Trade Name Freq  PRN Reason Stop Dose Admin


 


Acetaminophen  650 mg  07/07/18 19:55  07/09/18 22:36





  Tylenol Tab*  PO   650 mg





  Q6H PRN   Administration





  PAIN   





     





     





     


 


Aspirin  325 mg  06/27/18 09:00  07/13/18 09:42





  Ecotrin Ec Tab*  PO   325 mg





  DAILY CELESTE   Administration





     





     





     





     


 


Atorvastatin Calcium  20 mg  06/26/18 17:00  07/13/18 17:21





  Lipitor*  PO   20 mg





  1700 CELESTE   Administration





     





     





     





     


 


Bisacodyl  10 mg  06/30/18 12:18  07/11/18 19:59





  Dulcolax Supp*  HI   10 mg





  DAILY PRN   Administration





  CONSTIPATION   





     





     





     


 


Clopidogrel Bisulfate  75 mg  06/27/18 09:00  07/13/18 09:42





  Plavix Tab*  PO   75 mg





  DAILY CELESTE   Administration





     





     





     





     


 


Dextrose  12.5 gm  06/26/18 16:10  





  D50w Syringe 50 Ml*  IV PUSH   





  .FOR FS < 60 - SS PRN   





  FS < 60   





     





     





     


 


Docusate Sodium  100 mg  06/26/18 21:00  07/13/18 09:42





  Colace Cap*  PO   100 mg





  BID CELESTE   Administration





     





     





     





     


 


Fluoxetine HCl  20 mg  07/05/18 09:00  07/13/18 09:42





  Prozac Cap*  PO   20 mg





  DAILY CELESTE   Administration





     





     





     





     


 


Glyburide  5 mg  07/09/18 09:00  07/13/18 09:42





  Diabeta Tab*  PO   5 mg





  DAILY WITH MEAL CELESTE   Administration





     





     





     





     


 


Glyburide  2.5 mg  07/10/18 21:00  07/12/18 20:07





  Diabeta Tab*  PO   2.5 mg





  BEDTIME CELESTE   Administration





     





     





     





     


 


Hydrocortisone  25 mg  07/10/18 13:25  07/12/18 23:54





  Anusol Hc Supp*  HI   25 mg





  BID PRN   Administration





  hemorrhoids   





     





     





     


 


Insulin Human Lispro  0 - 10 units  07/08/18 11:30  07/13/18 17:00





  Humalog*  SUBCUT   4 units





  AC CELESTE   Administration





     





     





  Protocol   





     


 


Lidocaine HCl  1 applic  07/05/18 17:00  07/13/18 15:46





  Lidocaine 2% Jelly*  TOPICAL   Not Given





  QID CELESTE   





     





     





     





     


 


Magnesium Hydroxide  30 ml  06/26/18 15:53  07/11/18 16:50





  Milk Of Magnesia Liq*  PO   30 ml





  Q6H PRN   Administration





  CONSTIPATION   





     





     





     


 


Methylphenidate HCl  2.5 mg  07/11/18 07:00  07/13/18 12:45





  Ritalin Tab*  PO   2.5 mg





  0700,1100 CELESTE   Administration





     





     





     





     


 


Polyethylene Glycol/Electrolytes  17 gm  06/30/18 13:00  07/13/18 09:42





  Miralax*  PO   17 gm





  DAILY CELESTE   Administration





     





     





     





     


 


Senna  2 tab  06/26/18 15:53  07/09/18 22:36





  Senokot Tab*  PO   2 tab





  BEDTIME PRN   Administration





  CONSTIPATION   





     





     





     











Vital Signs: 


 Vital Signs











Temp Pulse Resp BP Pulse Ox


 


 98.4 F   82   16   114/49   96 


 


 07/13/18 15:56  07/13/18 15:56  07/13/18 15:56  07/13/18 15:56  07/13/18 17:24











Lab Results: 


 Laboratory Results - last 24 hr











  07/12/18 07/12/18 07/13/18





  17:30 22:36 07:44


 


POC Glucose (mg/dL)  335 H  133 H  73














  07/13/18





  12:00


 


POC Glucose (mg/dL)  172 H











Exam: 





GENERAL: no acute distress. alert and appropriate.


LUNGS: clear bilaterally.


HEART: regular rate and rhythm


ABDOMEN: + bowel sounds, soft, non-tender, non-distended.


EXTREMITIES: no edema.


NEUROLOGIC: left facial droop and tongue deviates left.  Left motor shows good 

arm movement,  hip flexion while supine 2+, DF 4. Sensation impaired on left.


: penile meatus with some clots. 


Assessment/Plan: 








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 PT/OT/SLP.  


2. Metastatic Prostate Cancer with prostatic bleeding: Sandoval changed per Dr. Bacon.  Bleeding less since last catheter change but hematuria in sandoval. 


3. Depression:  Prozac 20mg daily. Ritalin to 2.5mg 0700,1100.


4. DM: Blood sugars rising. Now on glyburide 5mg am and 2.5 mg pm (home dose 5 

bid). Continue SSI AC and discontinue bedtime coverage. 


5. DVT Prophylaxis: Heparin held due to penile bleeding


6. Advanced Directives: Full code


7. Dysphagia: regular textures, nectar thick liquids. Free water between meals


8. Constipation: Miralax. Will follow


9. Near syncope: Follow. 























07/13/18 17:38

## 2018-07-14 RX ADMIN — INSULIN LISPRO SCH UNITS: 100 INJECTION, SOLUTION INTRAVENOUS; SUBCUTANEOUS at 16:52

## 2018-07-14 RX ADMIN — METHYLPHENIDATE HYDROCHLORIDE SCH MG: 5 TABLET ORAL at 05:38

## 2018-07-14 RX ADMIN — GLYBURIDE SCH MG: 2.5 TABLET ORAL at 20:55

## 2018-07-14 RX ADMIN — ATORVASTATIN CALCIUM SCH MG: 20 TABLET, FILM COATED ORAL at 16:53

## 2018-07-14 RX ADMIN — LIDOCAINE HYDROCHLORIDE SCH: 20 JELLY TOPICAL at 20:55

## 2018-07-14 RX ADMIN — INSULIN LISPRO SCH UNITS: 100 INJECTION, SOLUTION INTRAVENOUS; SUBCUTANEOUS at 12:26

## 2018-07-14 RX ADMIN — LIDOCAINE HYDROCHLORIDE SCH: 20 JELLY TOPICAL at 16:53

## 2018-07-14 RX ADMIN — METHYLPHENIDATE HYDROCHLORIDE SCH MG: 5 TABLET ORAL at 11:15

## 2018-07-14 RX ADMIN — LIDOCAINE HYDROCHLORIDE SCH: 20 JELLY TOPICAL at 07:53

## 2018-07-14 RX ADMIN — DOCUSATE SODIUM SCH MG: 100 CAPSULE, LIQUID FILLED ORAL at 07:52

## 2018-07-14 RX ADMIN — HYDROCORTISONE ACETATE PRN MG: 25 SUPPOSITORY RECTAL at 19:53

## 2018-07-14 RX ADMIN — LIDOCAINE HYDROCHLORIDE SCH: 20 JELLY TOPICAL at 12:55

## 2018-07-14 RX ADMIN — ASPIRIN SCH MG: 325 TABLET, COATED ORAL at 07:53

## 2018-07-14 RX ADMIN — INSULIN LISPRO SCH: 100 INJECTION, SOLUTION INTRAVENOUS; SUBCUTANEOUS at 07:53

## 2018-07-14 RX ADMIN — HYDROCORTISONE ACETATE PRN MG: 25 SUPPOSITORY RECTAL at 05:44

## 2018-07-14 RX ADMIN — CLOPIDOGREL SCH MG: 75 TABLET, FILM COATED ORAL at 07:52

## 2018-07-14 RX ADMIN — GLYBURIDE SCH MG: 5 TABLET ORAL at 07:52

## 2018-07-14 RX ADMIN — POLYETHYLENE GLYCOL 3350 SCH GM: 17 POWDER, FOR SOLUTION ORAL at 07:53

## 2018-07-14 RX ADMIN — DOCUSATE SODIUM SCH MG: 100 CAPSULE, LIQUID FILLED ORAL at 20:55

## 2018-07-14 RX ADMIN — FLUOXETINE SCH MG: 20 CAPSULE ORAL at 07:52

## 2018-07-14 NOTE — PN
Progress Note


Date of Service: 07/14/18


Note: 


CAROLYN DEGROOT was visited. Therapy notes read and reviewed. He feels like he is 

doing ok. He is upbeat. No complaints. 











Current Medications: 


 Active Medications











Generic Name Dose Route Start Last Admin





  Trade Name Freq  PRN Reason Stop Dose Admin


 


Acetaminophen  650 mg  07/07/18 19:55  07/09/18 22:36





  Tylenol Tab*  PO   650 mg





  Q6H PRN   Administration





  PAIN   





     





     





     


 


Aspirin  325 mg  06/27/18 09:00  07/14/18 07:53





  Ecotrin Ec Tab*  PO   325 mg





  DAILY CELESTE   Administration





     





     





     





     


 


Atorvastatin Calcium  20 mg  06/26/18 17:00  07/14/18 16:53





  Lipitor*  PO   20 mg





  1700 CELESTE   Administration





     





     





     





     


 


Bisacodyl  10 mg  06/30/18 12:18  07/11/18 19:59





  Dulcolax Supp*  AL   10 mg





  DAILY PRN   Administration





  CONSTIPATION   





     





     





     


 


Clopidogrel Bisulfate  75 mg  06/27/18 09:00  07/14/18 07:52





  Plavix Tab*  PO   75 mg





  DAILY CELESTE   Administration





     





     





     





     


 


Dextrose  12.5 gm  06/26/18 16:10  





  D50w Syringe 50 Ml*  IV PUSH   





  .FOR FS < 60 - SS PRN   





  FS < 60   





     





     





     


 


Docusate Sodium  100 mg  06/26/18 21:00  07/14/18 07:52





  Colace Cap*  PO   100 mg





  BID CELESTE   Administration





     





     





     





     


 


Fluoxetine HCl  20 mg  07/05/18 09:00  07/14/18 07:52





  Prozac Cap*  PO   20 mg





  DAILY CELESTE   Administration





     





     





     





     


 


Glyburide  5 mg  07/09/18 09:00  07/14/18 07:52





  Diabeta Tab*  PO   5 mg





  DAILY WITH MEAL CELESTE   Administration





     





     





     





     


 


Glyburide  2.5 mg  07/10/18 21:00  07/13/18 21:23





  Diabeta Tab*  PO   2.5 mg





  BEDTIME CELESTE   Administration





     





     





     





     


 


Hydrocortisone  25 mg  07/10/18 13:25  07/14/18 19:53





  Anusol Hc Supp*  AL   25 mg





  BID PRN   Administration





  hemorrhoids   





     





     





     


 


Insulin Human Lispro  0 - 10 units  07/08/18 11:30  07/14/18 16:52





  Humalog*  SUBCUT   4 units





  AC CELESTE   Administration





     





     





  Protocol   





     


 


Lidocaine HCl  1 applic  07/05/18 17:00  07/14/18 16:53





  Lidocaine 2% Jelly*  TOPICAL   Not Given





  QID CELESTE   





     





     





     





     


 


Magnesium Hydroxide  30 ml  06/26/18 15:53  07/11/18 16:50





  Milk Of Magnesia Liq*  PO   30 ml





  Q6H PRN   Administration





  CONSTIPATION   





     





     





     


 


Methylphenidate HCl  2.5 mg  07/11/18 07:00  07/14/18 11:15





  Ritalin Tab*  PO   2.5 mg





  0700,1100 CELESTE   Administration





     





     





     





     


 


Polyethylene Glycol/Electrolytes  17 gm  06/30/18 13:00  07/14/18 07:53





  Miralax*  PO   17 gm





  DAILY CELESTE   Administration





     





     





     





     


 


Senna  2 tab  06/26/18 15:53  07/09/18 22:36





  Senokot Tab*  PO   2 tab





  BEDTIME PRN   Administration





  CONSTIPATION   





     





     





     











Vital Signs: 


 Vital Signs











Temp Pulse Resp BP Pulse Ox


 


 97.9 F   76   20   105/56   96 


 


 07/14/18 15:39  07/14/18 15:39  07/14/18 15:39  07/14/18 15:39  07/14/18 15:39











Lab Results: 


 Laboratory Results - last 24 hr











  07/13/18 07/13/18 07/14/18





  16:45 21:07 07:31


 


POC Glucose (mg/dL)  220 H  233 H  97














  07/14/18 07/14/18





  11:20 16:26


 


POC Glucose (mg/dL)  134 H  211 H











Exam: 





GENERAL: no acute distress. alert and appropriate.


LUNGS: clear bilaterally.


HEART: regular rate and rhythm


ABDOMEN: + bowel sounds, soft, non-tender, non-distended.


EXTREMITIES: no edema.


NEUROLOGIC: left facial droop and tongue deviates left.  Left motor shows good 

arm movement,  some movement in leg. Sensation impaired on left.


: penile meatus with some clots. 


Assessment/Plan: 








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 PT/OT/SLP.  


2. Metastatic Prostate Cancer with prostatic bleeding: [place] changed per Dr. Bacon.  Bleeding less since last catheter change but hematuria in sandoval. 


3. Depression:  Prozac 20mg daily. Ritalin to 2.5mg 0700,1100.


4. DM: 1630 FS still high. Now on glyburide 5mg am and 2.5 mg pm (home dose 5 

bid). Continue SSI AC and discontinue bedtime coverage. 


5. DVT Prophylaxis: Heparin held due to penile bleeding


6. Advanced Directives: Full code


7. Dysphagia: regular textures, nectar thick liquids. Free water between meals


8. Constipation: Miralax. Will follow























07/14/18 20:21





07/14/18 20:22

## 2018-07-15 RX ADMIN — HYDROCORTISONE ACETATE PRN MG: 25 SUPPOSITORY RECTAL at 23:34

## 2018-07-15 RX ADMIN — GLYBURIDE SCH MG: 2.5 TABLET ORAL at 19:46

## 2018-07-15 RX ADMIN — LIDOCAINE HYDROCHLORIDE SCH: 20 JELLY TOPICAL at 19:44

## 2018-07-15 RX ADMIN — METHYLPHENIDATE HYDROCHLORIDE SCH MG: 5 TABLET ORAL at 10:38

## 2018-07-15 RX ADMIN — ATORVASTATIN CALCIUM SCH MG: 20 TABLET, FILM COATED ORAL at 16:58

## 2018-07-15 RX ADMIN — METHYLPHENIDATE HYDROCHLORIDE SCH MG: 5 TABLET ORAL at 07:19

## 2018-07-15 RX ADMIN — INSULIN LISPRO SCH UNITS: 100 INJECTION, SOLUTION INTRAVENOUS; SUBCUTANEOUS at 12:06

## 2018-07-15 RX ADMIN — HYDROCORTISONE ACETATE PRN MG: 25 SUPPOSITORY RECTAL at 10:39

## 2018-07-15 RX ADMIN — CLOPIDOGREL SCH MG: 75 TABLET, FILM COATED ORAL at 07:18

## 2018-07-15 RX ADMIN — INSULIN LISPRO SCH: 100 INJECTION, SOLUTION INTRAVENOUS; SUBCUTANEOUS at 07:16

## 2018-07-15 RX ADMIN — INSULIN LISPRO SCH UNITS: 100 INJECTION, SOLUTION INTRAVENOUS; SUBCUTANEOUS at 16:36

## 2018-07-15 RX ADMIN — POLYETHYLENE GLYCOL 3350 SCH GM: 17 POWDER, FOR SOLUTION ORAL at 07:18

## 2018-07-15 RX ADMIN — LIDOCAINE HYDROCHLORIDE SCH: 20 JELLY TOPICAL at 07:18

## 2018-07-15 RX ADMIN — GLYBURIDE SCH MG: 5 TABLET ORAL at 07:18

## 2018-07-15 RX ADMIN — ASPIRIN SCH MG: 325 TABLET, COATED ORAL at 07:17

## 2018-07-15 RX ADMIN — DOCUSATE SODIUM SCH MG: 100 CAPSULE, LIQUID FILLED ORAL at 19:45

## 2018-07-15 RX ADMIN — LIDOCAINE HYDROCHLORIDE SCH: 20 JELLY TOPICAL at 12:34

## 2018-07-15 RX ADMIN — DOCUSATE SODIUM SCH MG: 100 CAPSULE, LIQUID FILLED ORAL at 07:18

## 2018-07-15 RX ADMIN — LIDOCAINE HYDROCHLORIDE SCH: 20 JELLY TOPICAL at 17:14

## 2018-07-15 RX ADMIN — FLUOXETINE SCH MG: 20 CAPSULE ORAL at 07:18

## 2018-07-15 NOTE — PN
Progress Note


Date of Service: 07/15/18


Note: 


CAROLYN DEGROOT was visited. Therapy notes read and reviewed. He is eager to 

resume therapy. He feels like he sleeps too much on the weekends. 








Current Medications: 


 Active Medications











Generic Name Dose Route Start Last Admin





  Trade Name Freq  PRN Reason Stop Dose Admin


 


Acetaminophen  650 mg  07/07/18 19:55  07/09/18 22:36





  Tylenol Tab*  PO   650 mg





  Q6H PRN   Administration





  PAIN   





     





     





     


 


Aspirin  325 mg  06/27/18 09:00  07/15/18 07:17





  Ecotrin Ec Tab*  PO   325 mg





  DAILY CELESTE   Administration





     





     





     





     


 


Atorvastatin Calcium  20 mg  06/26/18 17:00  07/15/18 16:58





  Lipitor*  PO   20 mg





  1700 CELESTE   Administration





     





     





     





     


 


Bisacodyl  10 mg  06/30/18 12:18  07/11/18 19:59





  Dulcolax Supp*  MT   10 mg





  DAILY PRN   Administration





  CONSTIPATION   





     





     





     


 


Clopidogrel Bisulfate  75 mg  06/27/18 09:00  07/15/18 07:18





  Plavix Tab*  PO   75 mg





  DAILY CELESTE   Administration





     





     





     





     


 


Dextrose  12.5 gm  06/26/18 16:10  





  D50w Syringe 50 Ml*  IV PUSH   





  .FOR FS < 60 - SS PRN   





  FS < 60   





     





     





     


 


Docusate Sodium  100 mg  06/26/18 21:00  07/15/18 19:45





  Colace Cap*  PO   100 mg





  BID CELESTE   Administration





     





     





     





     


 


Fluoxetine HCl  20 mg  07/05/18 09:00  07/15/18 07:18





  Prozac Cap*  PO   20 mg





  DAILY CELESTE   Administration





     





     





     





     


 


Glyburide  5 mg  07/09/18 09:00  07/15/18 07:18





  Diabeta Tab*  PO   5 mg





  DAILY WITH MEAL CELESTE   Administration





     





     





     





     


 


Glyburide  2.5 mg  07/10/18 21:00  07/15/18 19:46





  Diabeta Tab*  PO   2.5 mg





  BEDTIME CELESTE   Administration





     





     





     





     


 


Hydrocortisone  25 mg  07/10/18 13:25  07/15/18 10:39





  Anusol Hc Supp*  MT   25 mg





  BID PRN   Administration





  hemorrhoids   





     





     





     


 


Insulin Human Lispro  0 - 10 units  07/08/18 11:30  07/15/18 16:36





  Humalog*  SUBCUT   6 units





  AC CELESTE   Administration





     





     





  Protocol   





     


 


Lidocaine HCl  1 applic  07/05/18 17:00  07/15/18 19:44





  Lidocaine 2% Jelly*  TOPICAL   Not Given





  QID CELESTE   





     





     





     





     


 


Magnesium Hydroxide  30 ml  06/26/18 15:53  07/11/18 16:50





  Milk Of Magnesia Liq*  PO   30 ml





  Q6H PRN   Administration





  CONSTIPATION   





     





     





     


 


Methylphenidate HCl  2.5 mg  07/11/18 07:00  07/15/18 10:38





  Ritalin Tab*  PO   2.5 mg





  0700,1100 CELESTE   Administration





     





     





     





     


 


Polyethylene Glycol/Electrolytes  17 gm  06/30/18 13:00  07/15/18 07:18





  Miralax*  PO   17 gm





  DAILY CELESTE   Administration





     





     





     





     


 


Senna  2 tab  06/26/18 15:53  07/09/18 22:36





  Senokot Tab*  PO   2 tab





  BEDTIME PRN   Administration





  CONSTIPATION   





     





     





     











Vital Signs: 


 Vital Signs











Temp Pulse Resp BP Pulse Ox


 


 98.2 F   82   16   121/57   98 


 


 07/15/18 16:05  07/15/18 16:05  07/15/18 16:05  07/15/18 16:05  07/15/18 16:05











Lab Results: 


 Laboratory Results - last 24 hr











  07/15/18 07/15/18 07/15/18





  07:15 11:30 16:18


 


POC Glucose (mg/dL)  122 H  160 H  255 H














  07/15/18





  20:16


 


POC Glucose (mg/dL)  264 H











Exam: 





GENERAL: no acute distress. alert and appropriate.


LUNGS: clear bilaterally.


HEART: regular rate and rhythm


ABDOMEN: + bowel sounds, soft, non-tender, non-distended.


EXTREMITIES: no edema.


NEUROLOGIC: left facial droop and tongue deviates left.  Left motor shows good 

arm movement,  some movement in leg. Sensation impaired on left.


: penile meatus with some clots. 


Assessment/Plan: 








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 PT/OT/SLP.  


2. Metastatic Prostate Cancer with prostatic bleeding: [place] changed per Dr. Bacon.  Bleeding less since last catheter change but hematuria in sandoval. 


3. Depression:  Prozac 20mg daily. Ritalin 2.5mg 0700,1100.


4. DM: 1630 FS still high. 2030 FS high last night. Now on glyburide 5mg am and 

2.5 mg pm (home dose 5 bid). Continue SSI AC and discontinue bedtime coverage. 


5. DVT Prophylaxis: Heparin held due to penile bleeding


6. Advanced Directives: Full code


7. Dysphagia: regular textures, nectar thick liquids. Free water between meals


8. Constipation: Miralax. Will follow























07/15/18 20:39

## 2018-07-16 RX ADMIN — INSULIN LISPRO SCH UNITS: 100 INJECTION, SOLUTION INTRAVENOUS; SUBCUTANEOUS at 17:18

## 2018-07-16 RX ADMIN — ASPIRIN SCH MG: 325 TABLET, COATED ORAL at 09:17

## 2018-07-16 RX ADMIN — LIDOCAINE HYDROCHLORIDE SCH: 20 JELLY TOPICAL at 15:55

## 2018-07-16 RX ADMIN — LIDOCAINE HYDROCHLORIDE SCH: 20 JELLY TOPICAL at 09:18

## 2018-07-16 RX ADMIN — INSULIN LISPRO SCH: 100 INJECTION, SOLUTION INTRAVENOUS; SUBCUTANEOUS at 09:18

## 2018-07-16 RX ADMIN — POLYETHYLENE GLYCOL 3350 SCH GM: 17 POWDER, FOR SOLUTION ORAL at 09:18

## 2018-07-16 RX ADMIN — LIDOCAINE HYDROCHLORIDE SCH: 20 JELLY TOPICAL at 17:19

## 2018-07-16 RX ADMIN — CLOPIDOGREL SCH MG: 75 TABLET, FILM COATED ORAL at 09:17

## 2018-07-16 RX ADMIN — GLYBURIDE SCH MG: 2.5 TABLET ORAL at 19:59

## 2018-07-16 RX ADMIN — METHYLPHENIDATE HYDROCHLORIDE SCH MG: 5 TABLET ORAL at 13:06

## 2018-07-16 RX ADMIN — DOCUSATE SODIUM SCH MG: 100 CAPSULE, LIQUID FILLED ORAL at 19:59

## 2018-07-16 RX ADMIN — INSULIN LISPRO SCH UNITS: 100 INJECTION, SOLUTION INTRAVENOUS; SUBCUTANEOUS at 12:33

## 2018-07-16 RX ADMIN — HYDROCORTISONE ACETATE PRN MG: 25 SUPPOSITORY RECTAL at 23:26

## 2018-07-16 RX ADMIN — ATORVASTATIN CALCIUM SCH MG: 20 TABLET, FILM COATED ORAL at 17:19

## 2018-07-16 RX ADMIN — FLUOXETINE SCH MG: 20 CAPSULE ORAL at 09:17

## 2018-07-16 RX ADMIN — LIDOCAINE HYDROCHLORIDE SCH: 20 JELLY TOPICAL at 22:07

## 2018-07-16 RX ADMIN — DOCUSATE SODIUM SCH MG: 100 CAPSULE, LIQUID FILLED ORAL at 09:17

## 2018-07-16 RX ADMIN — METHYLPHENIDATE HYDROCHLORIDE SCH MG: 5 TABLET ORAL at 09:14

## 2018-07-16 RX ADMIN — HYDROCORTISONE ACETATE PRN MG: 25 SUPPOSITORY RECTAL at 11:14

## 2018-07-16 RX ADMIN — GLYBURIDE SCH MG: 5 TABLET ORAL at 09:15

## 2018-07-16 NOTE — PN
Progress Note


Date of Service: 07/16/18


Note: 


CAROLYN DEGROOT was visited. Therapy notes read and reviewed. While straining to 

have a BM this morning, had a syncopal episode. CAT team called. EKG showed no 

changes. Patient brought back to bed and regained consciousness after 10 

seconds. 








Current Medications: 


 Active Medications











Generic Name Dose Route Start Last Admin





  Trade Name Freq  PRN Reason Stop Dose Admin


 


Acetaminophen  650 mg  07/07/18 19:55  07/09/18 22:36





  Tylenol Tab*  PO   650 mg





  Q6H PRN   Administration





  PAIN   





     





     





     


 


Aspirin  325 mg  06/27/18 09:00  07/16/18 09:17





  Ecotrin Ec Tab*  PO   325 mg





  DAILY CELESTE   Administration





     





     





     





     


 


Atorvastatin Calcium  20 mg  06/26/18 17:00  07/16/18 17:19





  Lipitor*  PO   20 mg





  1700 CELESTE   Administration





     





     





     





     


 


Bisacodyl  10 mg  06/30/18 12:18  07/11/18 19:59





  Dulcolax Supp*  DC   10 mg





  DAILY PRN   Administration





  CONSTIPATION   





     





     





     


 


Clopidogrel Bisulfate  75 mg  06/27/18 09:00  07/16/18 09:17





  Plavix Tab*  PO   75 mg





  DAILY CELESTE   Administration





     





     





     





     


 


Dextrose  12.5 gm  06/26/18 16:10  





  D50w Syringe 50 Ml*  IV PUSH   





  .FOR FS < 60 - SS PRN   





  FS < 60   





     





     





     


 


Docusate Sodium  100 mg  06/26/18 21:00  07/16/18 09:17





  Colace Cap*  PO   100 mg





  BID CELESTE   Administration





     





     





     





     


 


Fluoxetine HCl  20 mg  07/05/18 09:00  07/16/18 09:17





  Prozac Cap*  PO   20 mg





  DAILY CELESTE   Administration





     





     





     





     


 


Glyburide  5 mg  07/09/18 09:00  07/16/18 09:15





  Diabeta Tab*  PO   5 mg





  DAILY WITH MEAL CELESTE   Administration





     





     





     





     


 


Glyburide  2.5 mg  07/10/18 21:00  07/15/18 19:46





  Diabeta Tab*  PO   2.5 mg





  BEDTIME CELESTE   Administration





     





     





     





     


 


Hydrocortisone  25 mg  07/10/18 13:25  07/16/18 11:14





  Anusol Hc Supp*  DC   25 mg





  BID PRN   Administration





  hemorrhoids   





     





     





     


 


Insulin Human Lispro  0 - 10 units  07/08/18 11:30  07/16/18 17:18





  Humalog*  SUBCUT   1 units





  AC CELESTE   Administration





     





     





  Protocol   





     


 


Lidocaine HCl  1 applic  07/05/18 17:00  07/16/18 17:19





  Lidocaine 2% Jelly*  TOPICAL   Not Given





  QID CELESTE   





     





     





     





     


 


Magnesium Hydroxide  30 ml  06/26/18 15:53  07/11/18 16:50





  Milk Of Magnesia Liq*  PO   30 ml





  Q6H PRN   Administration





  CONSTIPATION   





     





     





     


 


Methylphenidate HCl  2.5 mg  07/11/18 07:00  07/16/18 13:06





  Ritalin Tab*  PO   2.5 mg





  0700,1100 CELESTE   Administration





     





     





     





     


 


Polyethylene Glycol/Electrolytes  17 gm  06/30/18 13:00  07/16/18 09:18





  Miralax*  PO   17 gm





  DAILY CELESTE   Administration





     





     





     





     


 


Senna  2 tab  06/26/18 15:53  07/09/18 22:36





  Senokot Tab*  PO   2 tab





  BEDTIME PRN   Administration





  CONSTIPATION   





     





     





     











Vital Signs: 


 Vital Signs











Temp Pulse Resp BP Pulse Ox


 


 98.3 F   92   16   116/59   99 


 


 07/16/18 15:58  07/16/18 15:58  07/16/18 15:58  07/16/18 15:58  07/16/18 15:58











Lab Results: 


 Laboratory Results - last 24 hr











  07/15/18 07/16/18 07/16/18





  20:16 07:54 11:49


 


POC Glucose (mg/dL)  264 H  104 H  198 H














  07/16/18





  16:18


 


POC Glucose (mg/dL)  149 H











Exam: 








GENERAL: no acute distress. alert and appropriate.


LUNGS: clear bilaterally.


HEART: regular rate and rhythm


ABDOMEN: + bowel sounds, soft, non-tender, non-distended.


EXTREMITIES: no edema.


NEUROLOGIC: left facial droop and tongue deviates left.  Left motor shows good 

arm movement,  some movement in leg. Sensation impaired on left.


: penile meatus with some clots. 


Assessment/Plan: 








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 PT/OT/SLP.  


2. Metastatic Prostate Cancer with prostatic bleeding: [place] changed per Dr. Bacon.  Bleeding less since last catheter change. 


3. Depression:  Prozac 20mg daily. Ritalin 2.5mg 0700,1100.


4. DM: 1630 FS still high. 2030 FS high last night. Now on glyburide 5mg am and 

2.5 mg pm (home dose 5 bid). Continue SSI AC . 


5. DVT Prophylaxis: Heparin held due to penile bleeding


6. Advanced Directives: Full code


7. Dysphagia: regular textures, nectar thick liquids. Free water between meals


8. Constipation: Miralax. Will follow




















07/16/18 17:55

## 2018-07-17 RX ADMIN — LIDOCAINE HYDROCHLORIDE SCH: 20 JELLY TOPICAL at 10:19

## 2018-07-17 RX ADMIN — INSULIN LISPRO SCH: 100 INJECTION, SOLUTION INTRAVENOUS; SUBCUTANEOUS at 11:39

## 2018-07-17 RX ADMIN — GLYBURIDE SCH MG: 5 TABLET ORAL at 09:31

## 2018-07-17 RX ADMIN — GLYBURIDE SCH MG: 2.5 TABLET ORAL at 19:51

## 2018-07-17 RX ADMIN — METHYLPHENIDATE HYDROCHLORIDE SCH MG: 5 TABLET ORAL at 11:43

## 2018-07-17 RX ADMIN — INSULIN LISPRO SCH UNITS: 100 INJECTION, SOLUTION INTRAVENOUS; SUBCUTANEOUS at 17:17

## 2018-07-17 RX ADMIN — INSULIN LISPRO SCH: 100 INJECTION, SOLUTION INTRAVENOUS; SUBCUTANEOUS at 08:45

## 2018-07-17 RX ADMIN — ASPIRIN SCH MG: 325 TABLET, COATED ORAL at 09:30

## 2018-07-17 RX ADMIN — FLUOXETINE SCH MG: 20 CAPSULE ORAL at 09:30

## 2018-07-17 RX ADMIN — ATORVASTATIN CALCIUM SCH MG: 20 TABLET, FILM COATED ORAL at 16:54

## 2018-07-17 RX ADMIN — POLYETHYLENE GLYCOL 3350 SCH GM: 17 POWDER, FOR SOLUTION ORAL at 09:30

## 2018-07-17 RX ADMIN — LIDOCAINE HYDROCHLORIDE SCH: 20 JELLY TOPICAL at 12:51

## 2018-07-17 RX ADMIN — CLOPIDOGREL SCH MG: 75 TABLET, FILM COATED ORAL at 09:30

## 2018-07-17 RX ADMIN — LIDOCAINE HYDROCHLORIDE SCH: 20 JELLY TOPICAL at 20:28

## 2018-07-17 RX ADMIN — LIDOCAINE HYDROCHLORIDE SCH: 20 JELLY TOPICAL at 17:01

## 2018-07-17 RX ADMIN — DOCUSATE SODIUM SCH MG: 100 CAPSULE, LIQUID FILLED ORAL at 19:51

## 2018-07-17 RX ADMIN — DOCUSATE SODIUM SCH MG: 100 CAPSULE, LIQUID FILLED ORAL at 09:30

## 2018-07-17 RX ADMIN — METHYLPHENIDATE HYDROCHLORIDE SCH MG: 5 TABLET ORAL at 06:31

## 2018-07-17 NOTE — PN
Progress Note


Date of Service: 07/17/18


Note: 


CAROLYN DEGROOT was visited. Therapy notes read and reviewed. Carolyn was discussed 

in interdiscipplinary team rounds. It appears that he will need some help at 

discharge if he is able to go home. Will need to discuss with his son how much 

help the son can provide. Carolyn still seems flat. He tells me he has felt down 

since the death of his longtime girlfriend. Will check TSH








Current Medications: 


 Active Medications











Generic Name Dose Route Start Last Admin





  Trade Name Freq  PRN Reason Stop Dose Admin


 


Acetaminophen  650 mg  07/07/18 19:55  07/09/18 22:36





  Tylenol Tab*  PO   650 mg





  Q6H PRN   Administration





  PAIN   





     





     





     


 


Aspirin  325 mg  06/27/18 09:00  07/17/18 09:30





  Ecotrin Ec Tab*  PO   325 mg





  DAILY CELESTE   Administration





     





     





     





     


 


Atorvastatin Calcium  20 mg  06/26/18 17:00  07/17/18 16:54





  Lipitor*  PO   20 mg





  1700 CELESTE   Administration





     





     





     





     


 


Bisacodyl  10 mg  06/30/18 12:18  07/11/18 19:59





  Dulcolax Supp*  TN   10 mg





  DAILY PRN   Administration





  CONSTIPATION   





     





     





     


 


Clopidogrel Bisulfate  75 mg  06/27/18 09:00  07/17/18 09:30





  Plavix Tab*  PO   75 mg





  DAILY CELESTE   Administration





     





     





     





     


 


Dextrose  12.5 gm  06/26/18 16:10  





  D50w Syringe 50 Ml*  IV PUSH   





  .FOR FS < 60 - SS PRN   





  FS < 60   





     





     





     


 


Docusate Sodium  100 mg  06/26/18 21:00  07/17/18 09:30





  Colace Cap*  PO   100 mg





  BID CELESTE   Administration





     





     





     





     


 


Fluoxetine HCl  20 mg  07/05/18 09:00  07/17/18 09:30





  Prozac Cap*  PO   20 mg





  DAILY CELESTE   Administration





     





     





     





     


 


Glyburide  5 mg  07/09/18 09:00  07/17/18 09:31





  Diabeta Tab*  PO   5 mg





  DAILY WITH MEAL CELESTE   Administration





     





     





     





     


 


Glyburide  2.5 mg  07/10/18 21:00  07/16/18 19:59





  Diabeta Tab*  PO   2.5 mg





  BEDTIME CELESTE   Administration





     





     





     





     


 


Hydrocortisone  25 mg  07/10/18 13:25  07/16/18 23:26





  Anusol Hc Supp*  TN   25 mg





  BID PRN   Administration





  hemorrhoids   





     





     





     


 


Insulin Human Lispro  0 - 10 units  07/08/18 11:30  07/17/18 17:17





  Humalog*  SUBCUT   2 units





  AC CELESTE   Administration





     





     





  Protocol   





     


 


Lidocaine HCl  1 applic  07/05/18 17:00  07/17/18 17:01





  Lidocaine 2% Jelly*  TOPICAL   Not Given





  QID CELESTE   





     





     





     





     


 


Magnesium Hydroxide  30 ml  06/26/18 15:53  07/11/18 16:50





  Milk Of Magnesia Liq*  PO   30 ml





  Q6H PRN   Administration





  CONSTIPATION   





     





     





     


 


Methylphenidate HCl  2.5 mg  07/11/18 07:00  07/17/18 11:43





  Ritalin Tab*  PO   2.5 mg





  0700,1100 CELESTE   Administration





     





     





     





     


 


Polyethylene Glycol/Electrolytes  17 gm  06/30/18 13:00  07/17/18 09:30





  Miralax*  PO   17 gm





  DAILY CELESTE   Administration





     





     





     





     


 


Senna  2 tab  06/26/18 15:53  07/09/18 22:36





  Senokot Tab*  PO   2 tab





  BEDTIME PRN   Administration





  CONSTIPATION   





     





     





     











Vital Signs: 


 Vital Signs











Temp Pulse Resp BP Pulse Ox


 


 99.3 F   87   18   112/51   96 


 


 07/17/18 16:20  07/17/18 16:20  07/17/18 16:20  07/17/18 16:20  07/17/18 16:20











Lab Results: 


 Laboratory Results - last 24 hr











  07/16/18 07/17/18 07/17/18





  20:02 07:57 11:31


 


POC Glucose (mg/dL)  181 H  74  115 H














  07/17/18





  16:19


 


POC Glucose (mg/dL)  188 H











Exam: 





GENERAL: no acute distress. alert and appropriate.


LUNGS: clear bilaterally.


HEART: regular rate and rhythm


ABDOMEN: + bowel sounds, soft, non-tender, non-distended.


EXTREMITIES: no edema.


NEUROLOGIC: left facial droop and tongue deviates left.  Left motor shows good 

arm movement,  some movement in leg. Sensation impaired on left.


: penile meatus with some clots. 


Assessment/Plan: 








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 PT/OT/SLP.  


2. Metastatic Prostate Cancer with prostatic bleeding: Marshall 


3. Depression:  Prozac 20mg daily. Ritalin 2.5mg 0700,1100.Check TSH


4. DM: 1630 FS still high. 2030 FS high last night. Now on glyburide 5mg am and 

2.5 mg pm (home dose 5 bid). Continue SSI AC . 


5. DVT Prophylaxis: Heparin held due to penile bleeding


6. Advanced Directives: Full code


7. Dysphagia: regular textures, nectar thick liquids. Free water between meals


8. Constipation: Miralax. Will follow




















07/17/18 17:26

## 2018-07-18 LAB
BASOPHILS # BLD AUTO: 0.1 10^3/UL (ref 0–0.2)
EOSINOPHIL # BLD AUTO: 0.2 10^3/UL (ref 0–0.6)
HCT VFR BLD AUTO: 28 % (ref 42–52)
HGB BLD-MCNC: 9.5 G/DL (ref 14–18)
LYMPHOCYTES # BLD AUTO: 0.9 10^3/UL (ref 1–4.8)
MCH RBC QN AUTO: 28 PG (ref 27–31)
MCHC RBC AUTO-ENTMCNC: 34 G/DL (ref 31–36)
MCV RBC AUTO: 84 FL (ref 80–94)
MONOCYTES # BLD AUTO: 1.4 10^3/UL (ref 0–0.8)
NEUTROPHILS # BLD AUTO: 7.1 10^3/UL (ref 1.5–7.7)
NRBC # BLD AUTO: 0 10^3/UL
NRBC BLD QL AUTO: 0.1
PLATELET # BLD AUTO: 244 10^3/UL (ref 150–450)
RBC # BLD AUTO: 3.34 10^6/UL (ref 4–5.4)
WBC # BLD AUTO: 9.6 10^3/UL (ref 3.5–10.8)

## 2018-07-18 RX ADMIN — LIDOCAINE HYDROCHLORIDE SCH: 20 JELLY TOPICAL at 12:03

## 2018-07-18 RX ADMIN — CLOPIDOGREL SCH MG: 75 TABLET, FILM COATED ORAL at 09:30

## 2018-07-18 RX ADMIN — LIDOCAINE HYDROCHLORIDE SCH: 20 JELLY TOPICAL at 09:39

## 2018-07-18 RX ADMIN — INSULIN LISPRO SCH UNITS: 100 INJECTION, SOLUTION INTRAVENOUS; SUBCUTANEOUS at 17:31

## 2018-07-18 RX ADMIN — ATORVASTATIN CALCIUM SCH MG: 20 TABLET, FILM COATED ORAL at 17:13

## 2018-07-18 RX ADMIN — METHYLPHENIDATE HYDROCHLORIDE SCH MG: 5 TABLET ORAL at 11:29

## 2018-07-18 RX ADMIN — GLYBURIDE SCH MG: 2.5 TABLET ORAL at 20:26

## 2018-07-18 RX ADMIN — POLYETHYLENE GLYCOL 3350 SCH GM: 17 POWDER, FOR SOLUTION ORAL at 09:20

## 2018-07-18 RX ADMIN — DOCUSATE SODIUM SCH MG: 100 CAPSULE, LIQUID FILLED ORAL at 20:24

## 2018-07-18 RX ADMIN — ASPIRIN SCH MG: 325 TABLET, COATED ORAL at 09:30

## 2018-07-18 RX ADMIN — GLYBURIDE SCH MG: 5 TABLET ORAL at 09:30

## 2018-07-18 RX ADMIN — INSULIN LISPRO SCH UNITS: 100 INJECTION, SOLUTION INTRAVENOUS; SUBCUTANEOUS at 12:07

## 2018-07-18 RX ADMIN — FLUOXETINE SCH MG: 20 CAPSULE ORAL at 09:30

## 2018-07-18 RX ADMIN — LIDOCAINE HYDROCHLORIDE SCH: 20 JELLY TOPICAL at 17:15

## 2018-07-18 RX ADMIN — LIDOCAINE HYDROCHLORIDE SCH: 20 JELLY TOPICAL at 20:27

## 2018-07-18 RX ADMIN — INSULIN LISPRO SCH: 100 INJECTION, SOLUTION INTRAVENOUS; SUBCUTANEOUS at 09:17

## 2018-07-18 RX ADMIN — DOCUSATE SODIUM SCH MG: 100 CAPSULE, LIQUID FILLED ORAL at 09:30

## 2018-07-18 RX ADMIN — METHYLPHENIDATE HYDROCHLORIDE SCH MG: 5 TABLET ORAL at 05:38

## 2018-07-18 NOTE — PN
Progress Note


Date of Service: 07/18/18


Note: 


CAROLYN DEGROOT was visited. Therapy notes read and reviewed. Will have a meeting 

with his son tomorrow about discharge options. Son is not likely to be able to 

provide much physical assistance at home








Current Medications: 


 Active Medications











Generic Name Dose Route Start Last Admin





  Trade Name Freq  PRN Reason Stop Dose Admin


 


Acetaminophen  650 mg  07/07/18 19:55  07/09/18 22:36





  Tylenol Tab*  PO   650 mg





  Q6H PRN   Administration





  PAIN   





     





     





     


 


Aspirin  325 mg  06/27/18 09:00  07/18/18 09:30





  Ecotrin Ec Tab*  PO   325 mg





  DAILY CELESTE   Administration





     





     





     





     


 


Atorvastatin Calcium  20 mg  06/26/18 17:00  07/18/18 17:13





  Lipitor*  PO   20 mg





  1700 CELESTE   Administration





     





     





     





     


 


Bisacodyl  10 mg  06/30/18 12:18  07/11/18 19:59





  Dulcolax Supp*  IA   10 mg





  DAILY PRN   Administration





  CONSTIPATION   





     





     





     


 


Clopidogrel Bisulfate  75 mg  06/27/18 09:00  07/18/18 09:30





  Plavix Tab*  PO   75 mg





  DAILY CELESTE   Administration





     





     





     





     


 


Dextrose  12.5 gm  06/26/18 16:10  





  D50w Syringe 50 Ml*  IV PUSH   





  .FOR FS < 60 - SS PRN   





  FS < 60   





     





     





     


 


Docusate Sodium  100 mg  06/26/18 21:00  07/18/18 20:24





  Colace Cap*  PO   100 mg





  BID CELESTE   Administration





     





     





     





     


 


Fluoxetine HCl  20 mg  07/05/18 09:00  07/18/18 09:30





  Prozac Cap*  PO   20 mg





  DAILY CELESTE   Administration





     





     





     





     


 


Glyburide  5 mg  07/09/18 09:00  07/18/18 09:30





  Diabeta Tab*  PO   5 mg





  DAILY WITH MEAL CELESTE   Administration





     





     





     





     


 


Glyburide  2.5 mg  07/10/18 21:00  07/18/18 20:26





  Diabeta Tab*  PO   2.5 mg





  BEDTIME CELESTE   Administration





     





     





     





     


 


Hydrocortisone  25 mg  07/10/18 13:25  07/16/18 23:26





  Anusol Hc Supp*  IA   25 mg





  BID PRN   Administration





  hemorrhoids   





     





     





     


 


Insulin Human Lispro  0 - 10 units  07/08/18 11:30  07/18/18 17:31





  Humalog*  SUBCUT   2 units





  AC CELESTE   Administration





     





     





  Protocol   





     


 


Lidocaine HCl  1 applic  07/05/18 17:00  07/18/18 20:27





  Lidocaine 2% Jelly*  TOPICAL   Not Given





  QID CELESTE   





     





     





     





     


 


Magnesium Hydroxide  30 ml  06/26/18 15:53  07/11/18 16:50





  Milk Of Magnesia Liq*  PO   30 ml





  Q6H PRN   Administration





  CONSTIPATION   





     





     





     


 


Methylphenidate HCl  2.5 mg  07/11/18 07:00  07/18/18 11:29





  Ritalin Tab*  PO   2.5 mg





  0700,1100 CELESTE   Administration





     





     





     





     


 


Polyethylene Glycol/Electrolytes  17 gm  06/30/18 13:00  07/18/18 09:20





  Miralax*  PO   17 gm





  DAILY CELESTE   Administration





     





     





     





     


 


Senna  2 tab  06/26/18 15:53  07/09/18 22:36





  Senokot Tab*  PO   2 tab





  BEDTIME PRN   Administration





  CONSTIPATION   





     





     





     











Vital Signs: 


 Vital Signs











Temp Pulse Resp BP Pulse Ox


 


 98.6 F   75   16   118/51   96 


 


 07/18/18 15:04  07/18/18 15:04  07/18/18 15:04  07/18/18 15:04  07/18/18 18:41











Lab Results: 


 Laboratory Results - last 24 hr











  07/18/18 07/18/18 07/18/18





  05:28 05:28 09:13


 


WBC  9.6  


 


RBC  3.34 L  


 


Hgb  9.5 L  


 


Hct  28 L  


 


MCV  84  


 


MCH  28  


 


MCHC  34  


 


RDW  15  


 


Plt Count  244  


 


MPV  7.3 L  


 


Neut % (Auto)  73.4  


 


Lymph % (Auto)  9.7 L  


 


Mono % (Auto)  14.5 H  


 


Eos % (Auto)  1.8  


 


Baso % (Auto)  0.6  


 


Absolute Neuts (auto)  7.1  


 


Absolute Lymphs (auto)  0.9 L  


 


Absolute Monos (auto)  1.4 H  


 


Absolute Eos (auto)  0.2  


 


Absolute Basos (auto)  0.1  


 


Absolute Nucleated RBC  0  


 


Nucleated RBC %  0.1  


 


Sodium   133 L 


 


Potassium   4.1 


 


Chloride   101 


 


Carbon Dioxide   25 


 


Anion Gap   7 


 


BUN   19 


 


Creatinine   1.14 


 


Est GFR ( Amer)   73.9 


 


Est GFR (Non-Af Amer)   61.1 


 


BUN/Creatinine Ratio   16.7 


 


Glucose   87 


 


POC Glucose (mg/dL)    82


 


Calcium   8.5 L 


 


Total Bilirubin   0.40 


 


AST   10 L 


 


ALT   4 L 


 


Alkaline Phosphatase   75 


 


Total Protein   6.1 L 


 


Albumin   2.7 L 


 


Globulin   3.4 


 


Albumin/Globulin Ratio   0.8 L 


 


TSH   1.81 














  07/18/18 07/18/18





  11:38 16:34


 


WBC  


 


RBC  


 


Hgb  


 


Hct  


 


MCV  


 


MCH  


 


MCHC  


 


RDW  


 


Plt Count  


 


MPV  


 


Neut % (Auto)  


 


Lymph % (Auto)  


 


Mono % (Auto)  


 


Eos % (Auto)  


 


Baso % (Auto)  


 


Absolute Neuts (auto)  


 


Absolute Lymphs (auto)  


 


Absolute Monos (auto)  


 


Absolute Eos (auto)  


 


Absolute Basos (auto)  


 


Absolute Nucleated RBC  


 


Nucleated RBC %  


 


Sodium  


 


Potassium  


 


Chloride  


 


Carbon Dioxide  


 


Anion Gap  


 


BUN  


 


Creatinine  


 


Est GFR ( Amer)  


 


Est GFR (Non-Af Amer)  


 


BUN/Creatinine Ratio  


 


Glucose  


 


POC Glucose (mg/dL)  222 H  174 H


 


Calcium  


 


Total Bilirubin  


 


AST  


 


ALT  


 


Alkaline Phosphatase  


 


Total Protein  


 


Albumin  


 


Globulin  


 


Albumin/Globulin Ratio  


 


TSH  











Exam: 





GENERAL: no acute distress. alert and appropriate.


LUNGS: clear bilaterally.


HEART: regular rate and rhythm


ABDOMEN: + bowel sounds, soft, non-tender, non-distended.


EXTREMITIES: no edema.


NEUROLOGIC: left facial droop and tongue deviates left.  Left motor shows good 

arm movement,  some movement in leg. Sensation impaired on left.


: penile meatus with some clots. 


Assessment/Plan: 








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 PT/OT/SLP.  


2. Metastatic Prostate Cancer with prostatic bleeding: [facility] 


3. Depression:  Prozac 20mg daily. Ritalin 2.5mg 0700,1100.Check TSH


4. DM: 1630 FS still high. 2030 FS high last night. Now on glyburide 5mg am and 

2.5 mg pm (home dose 5 bid). Continue SSI AC . 


5. DVT Prophylaxis: Heparin held due to penile bleeding


6. Advanced Directives: Full code


7. Dysphagia: regular textures, nectar thick liquids. Free water between meals


8. Constipation: Miralax. Will follow

















07/18/18 20:29

## 2018-07-19 RX ADMIN — ATORVASTATIN CALCIUM SCH MG: 20 TABLET, FILM COATED ORAL at 17:13

## 2018-07-19 RX ADMIN — GLYBURIDE SCH MG: 5 TABLET ORAL at 09:03

## 2018-07-19 RX ADMIN — ASPIRIN SCH MG: 325 TABLET, COATED ORAL at 09:02

## 2018-07-19 RX ADMIN — LIDOCAINE HYDROCHLORIDE SCH: 20 JELLY TOPICAL at 17:12

## 2018-07-19 RX ADMIN — GLYBURIDE SCH MG: 2.5 TABLET ORAL at 20:06

## 2018-07-19 RX ADMIN — HYDROCORTISONE ACETATE PRN MG: 25 SUPPOSITORY RECTAL at 10:10

## 2018-07-19 RX ADMIN — POLYETHYLENE GLYCOL 3350 SCH GM: 17 POWDER, FOR SOLUTION ORAL at 09:00

## 2018-07-19 RX ADMIN — INSULIN LISPRO SCH UNITS: 100 INJECTION, SOLUTION INTRAVENOUS; SUBCUTANEOUS at 12:19

## 2018-07-19 RX ADMIN — METHYLPHENIDATE HYDROCHLORIDE SCH MG: 5 TABLET ORAL at 12:18

## 2018-07-19 RX ADMIN — LIDOCAINE HYDROCHLORIDE SCH: 20 JELLY TOPICAL at 12:44

## 2018-07-19 RX ADMIN — LIDOCAINE HYDROCHLORIDE SCH: 20 JELLY TOPICAL at 20:04

## 2018-07-19 RX ADMIN — FLUOXETINE SCH MG: 20 CAPSULE ORAL at 09:03

## 2018-07-19 RX ADMIN — LIDOCAINE HYDROCHLORIDE SCH: 20 JELLY TOPICAL at 09:03

## 2018-07-19 RX ADMIN — DOCUSATE SODIUM SCH MG: 100 CAPSULE, LIQUID FILLED ORAL at 09:03

## 2018-07-19 RX ADMIN — METHYLPHENIDATE HYDROCHLORIDE SCH MG: 5 TABLET ORAL at 06:35

## 2018-07-19 RX ADMIN — INSULIN LISPRO SCH UNITS: 100 INJECTION, SOLUTION INTRAVENOUS; SUBCUTANEOUS at 17:38

## 2018-07-19 RX ADMIN — INSULIN LISPRO SCH: 100 INJECTION, SOLUTION INTRAVENOUS; SUBCUTANEOUS at 07:22

## 2018-07-19 RX ADMIN — CLOPIDOGREL SCH MG: 75 TABLET, FILM COATED ORAL at 09:03

## 2018-07-19 RX ADMIN — DOCUSATE SODIUM SCH: 100 CAPSULE, LIQUID FILLED ORAL at 20:04

## 2018-07-19 NOTE — PN
Progress Note


Date of Service: 07/19/18


Note: 


CAROLYN DEGROOT was visited. Therapy notes read and reviewed. A meeting was held 

with Carolyn and his son. Carolyn will go to a subacute and work on more rehab before 

going home. 











Current Medications: 


 Active Medications











Generic Name Dose Route Start Last Admin





  Trade Name Freq  PRN Reason Stop Dose Admin


 


Acetaminophen  650 mg  07/07/18 19:55  07/09/18 22:36





  Tylenol Tab*  PO   650 mg





  Q6H PRN   Administration





  PAIN   





     





     





     


 


Aspirin  325 mg  06/27/18 09:00  07/19/18 09:02





  Ecotrin Ec Tab*  PO   325 mg





  DAILY CELESTE   Administration





     





     





     





     


 


Atorvastatin Calcium  20 mg  06/26/18 17:00  07/19/18 17:13





  Lipitor*  PO   20 mg





  1700 CELESTE   Administration





     





     





     





     


 


Bisacodyl  10 mg  06/30/18 12:18  07/11/18 19:59





  Dulcolax Supp*  CA   10 mg





  DAILY PRN   Administration





  CONSTIPATION   





     





     





     


 


Clopidogrel Bisulfate  75 mg  06/27/18 09:00  07/19/18 09:03





  Plavix Tab*  PO   75 mg





  DAILY CELESTE   Administration





     





     





     





     


 


Dextrose  12.5 gm  06/26/18 16:10  





  D50w Syringe 50 Ml*  IV PUSH   





  .FOR FS < 60 - SS PRN   





  FS < 60   





     





     





     


 


Docusate Sodium  100 mg  06/26/18 21:00  07/19/18 09:03





  Colace Cap*  PO   100 mg





  BID CELESTE   Administration





     





     





     





     


 


Fluoxetine HCl  20 mg  07/05/18 09:00  07/19/18 09:03





  Prozac Cap*  PO   20 mg





  DAILY CELESTE   Administration





     





     





     





     


 


Glyburide  5 mg  07/09/18 09:00  07/19/18 09:03





  Diabeta Tab*  PO   5 mg





  DAILY WITH MEAL CELESTE   Administration





     





     





     





     


 


Glyburide  2.5 mg  07/10/18 21:00  07/18/18 20:26





  Diabeta Tab*  PO   2.5 mg





  BEDTIME CELESTE   Administration





     





     





     





     


 


Hydrocortisone  25 mg  07/10/18 13:25  07/19/18 10:10





  Anusol Hc Supp*  CA   25 mg





  BID PRN   Administration





  hemorrhoids   





     





     





     


 


Insulin Human Lispro  0 - 10 units  07/08/18 11:30  07/19/18 17:38





  Humalog*  SUBCUT   4 units





  AC CELESTE   Administration





     





     





  Protocol   





     


 


Lidocaine HCl  1 applic  07/05/18 17:00  07/19/18 17:12





  Lidocaine 2% Jelly*  TOPICAL   Not Given





  QID CELESTE   





     





     





     





     


 


Magnesium Hydroxide  30 ml  06/26/18 15:53  07/11/18 16:50





  Milk Of Magnesia Liq*  PO   30 ml





  Q6H PRN   Administration





  CONSTIPATION   





     





     





     


 


Methylphenidate HCl  2.5 mg  07/11/18 07:00  07/19/18 12:18





  Ritalin Tab*  PO   2.5 mg





  0700,1100 CELESTE   Administration





     





     





     





     


 


Polyethylene Glycol/Electrolytes  17 gm  06/30/18 13:00  07/19/18 09:00





  Miralax*  PO   17 gm





  DAILY CELESTE   Administration





     





     





     





     


 


Senna  2 tab  06/26/18 15:53  07/09/18 22:36





  Senokot Tab*  PO   2 tab





  BEDTIME PRN   Administration





  CONSTIPATION   





     





     





     











Vital Signs: 


 Vital Signs











Temp Pulse Resp BP Pulse Ox


 


 98.3 F   76   22   98/49   98 


 


 07/19/18 15:55  07/19/18 15:55  07/19/18 15:55  07/19/18 15:55  07/19/18 15:55











Lab Results: 


 Laboratory Results - last 24 hr











  07/18/18 07/19/18 07/19/18





  20:28 07:22 11:43


 


POC Glucose (mg/dL)  204 H  97  193 H














  07/19/18





  16:43


 


POC Glucose (mg/dL)  201 H











Exam: 





GENERAL: no acute distress. alert and appropriate.


LUNGS: clear bilaterally.


HEART: regular rate and rhythm


ABDOMEN: + bowel sounds, soft, non-tender, non-distended.


EXTREMITIES: no edema.


NEUROLOGIC: left facial droop and tongue deviates left.  Left motor shows good 

arm movement,  some movement in leg. Sensation impaired on left.


: penile meatus with some clots. 


Assessment/Plan: 








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 PT/OT/SLP.  


2. Metastatic Prostate Cancer with prostatic bleeding: Marshall 


3. Depression:  Prozac 20mg daily. Ritalin 2.5mg 0700,1100.TSH normal


4. DM: 1630 FS still high.  Now on glyburide 5mg am and 2.5 mg pm (home dose 5 

bid). Continue SSI AC . 


5. DVT Prophylaxis: Heparin held due to penile bleeding


6. Advanced Directives: Full code


7. Dysphagia: regular textures, nectar thick liquids. Free water between meals


8. Constipation: Miralax. Will follow




















07/19/18 19:14

## 2018-07-20 RX ADMIN — LIDOCAINE HYDROCHLORIDE SCH: 20 JELLY TOPICAL at 20:02

## 2018-07-20 RX ADMIN — METHYLPHENIDATE HYDROCHLORIDE SCH MG: 5 TABLET ORAL at 11:59

## 2018-07-20 RX ADMIN — ASPIRIN SCH MG: 325 TABLET, COATED ORAL at 09:50

## 2018-07-20 RX ADMIN — LIDOCAINE HYDROCHLORIDE SCH: 20 JELLY TOPICAL at 09:51

## 2018-07-20 RX ADMIN — INSULIN LISPRO SCH UNITS: 100 INJECTION, SOLUTION INTRAVENOUS; SUBCUTANEOUS at 11:59

## 2018-07-20 RX ADMIN — GLYBURIDE SCH MG: 2.5 TABLET ORAL at 20:02

## 2018-07-20 RX ADMIN — LIDOCAINE HYDROCHLORIDE SCH: 20 JELLY TOPICAL at 17:05

## 2018-07-20 RX ADMIN — CLOPIDOGREL SCH MG: 75 TABLET, FILM COATED ORAL at 09:50

## 2018-07-20 RX ADMIN — INSULIN LISPRO SCH UNITS: 100 INJECTION, SOLUTION INTRAVENOUS; SUBCUTANEOUS at 17:28

## 2018-07-20 RX ADMIN — DOCUSATE SODIUM SCH: 100 CAPSULE, LIQUID FILLED ORAL at 09:51

## 2018-07-20 RX ADMIN — LIDOCAINE HYDROCHLORIDE SCH: 20 JELLY TOPICAL at 12:01

## 2018-07-20 RX ADMIN — POLYETHYLENE GLYCOL 3350 SCH: 17 POWDER, FOR SOLUTION ORAL at 09:51

## 2018-07-20 RX ADMIN — GLYBURIDE SCH MG: 5 TABLET ORAL at 09:50

## 2018-07-20 RX ADMIN — METHYLPHENIDATE HYDROCHLORIDE SCH MG: 5 TABLET ORAL at 08:16

## 2018-07-20 RX ADMIN — DOCUSATE SODIUM SCH MG: 100 CAPSULE, LIQUID FILLED ORAL at 20:02

## 2018-07-20 RX ADMIN — FLUOXETINE SCH MG: 20 CAPSULE ORAL at 09:50

## 2018-07-20 RX ADMIN — ATORVASTATIN CALCIUM SCH MG: 20 TABLET, FILM COATED ORAL at 17:29

## 2018-07-20 RX ADMIN — INSULIN LISPRO SCH: 100 INJECTION, SOLUTION INTRAVENOUS; SUBCUTANEOUS at 07:20

## 2018-07-20 NOTE — PN
Progress Note


Date of Service: 07/20/18


Note: 


CAROLYN DEGROOT was visited. Nursing and therapy notes read and reviewed. Did not 

like his breakfast. No chest pain, shortness of breath or abdominal pain.  








Current Medications: 


 Active Medications











Generic Name Dose Route Start Last Admin





  Trade Name Freq  PRN Reason Stop Dose Admin


 


Acetaminophen  650 mg  07/07/18 19:55  07/09/18 22:36





  Tylenol Tab*  PO   650 mg





  Q6H PRN   Administration





  PAIN   





     





     





     


 


Aspirin  325 mg  06/27/18 09:00  07/20/18 09:50





  Ecotrin Ec Tab*  PO   325 mg





  DAILY CELESTE   Administration





     





     





     





     


 


Atorvastatin Calcium  20 mg  06/26/18 17:00  07/19/18 17:13





  Lipitor*  PO   20 mg





  1700 CELESTE   Administration





     





     





     





     


 


Bisacodyl  10 mg  06/30/18 12:18  07/11/18 19:59





  Dulcolax Supp*  NM   10 mg





  DAILY PRN   Administration





  CONSTIPATION   





     





     





     


 


Clopidogrel Bisulfate  75 mg  06/27/18 09:00  07/20/18 09:50





  Plavix Tab*  PO   75 mg





  DAILY CELESTE   Administration





     





     





     





     


 


Dextrose  12.5 gm  06/26/18 16:10  





  D50w Syringe 50 Ml*  IV PUSH   





  .FOR FS < 60 - SS PRN   





  FS < 60   





     





     





     


 


Docusate Sodium  100 mg  06/26/18 21:00  07/20/18 09:51





  Colace Cap*  PO   Not Given





  BID CELESTE   





     





     





     





     


 


Fluoxetine HCl  20 mg  07/05/18 09:00  07/20/18 09:50





  Prozac Cap*  PO   20 mg





  DAILY CELESTE   Administration





     





     





     





     


 


Glyburide  5 mg  07/09/18 09:00  07/20/18 09:50





  Diabeta Tab*  PO   5 mg





  DAILY WITH MEAL CELESTE   Administration





     





     





     





     


 


Glyburide  2.5 mg  07/10/18 21:00  07/19/18 20:06





  Diabeta Tab*  PO   2.5 mg





  BEDTIME CELESTE   Administration





     





     





     





     


 


Hydrocortisone  25 mg  07/10/18 13:25  07/19/18 10:10





  Anusol Hc Supp*  NM   25 mg





  BID PRN   Administration





  hemorrhoids   





     





     





     


 


Insulin Human Lispro  0 - 10 units  07/08/18 11:30  07/20/18 07:20





  Humalog*  SUBCUT   Not Given





  AC [facility]   





     





     





  Protocol   





     


 


Lidocaine HCl  1 applic  07/05/18 17:00  07/20/18 09:51





  Lidocaine 2% Jelly*  TOPICAL   Not Given





  QID CELESTE   





     





     





     





     


 


Magnesium Hydroxide  30 ml  06/26/18 15:53  07/11/18 16:50





  Milk Of Magnesia Liq*  PO   30 ml





  Q6H PRN   Administration





  CONSTIPATION   





     





     





     


 


Methylphenidate HCl  2.5 mg  07/11/18 07:00  07/20/18 08:16





  Ritalin Tab*  PO   2.5 mg





  0700,1100 CELESTE   Administration





     





     





     





     


 


Polyethylene Glycol/Electrolytes  17 gm  06/30/18 13:00  07/20/18 09:51





  Miralax*  PO   Not Given





  DAILY CELESTE   





     





     





     





     


 


Senna  2 tab  06/26/18 15:53  07/09/18 22:36





  Senokot Tab*  PO   2 tab





  BEDTIME PRN   Administration





  CONSTIPATION   





     





     





     











Vital Signs: 


 Vital Signs











Temp Pulse Resp BP Pulse Ox


 


 98.0 F   112   16   110/56   92 


 


 07/20/18 05:37  07/20/18 05:37  07/20/18 05:37  07/20/18 05:37  07/20/18 05:37





Pulse retaken = 89


O2sat 99% on room air.





Lab Results: 


 Laboratory Results - last 24 hr











  07/19/18 07/19/18 07/19/18





  11:43 16:43 21:37


 


POC Glucose (mg/dL)  193 H  201 H  111 H














  07/20/18





  07:18


 


POC Glucose (mg/dL)  62 L











Exam: 





GENERAL: no acute distress. alert and appropriate.


LUNGS: clear bilaterally.


HEART: regular rate and rhythm


ABDOMEN: + bowel sounds, soft, non-tender, non-distended.


EXTREMITIES: no edema.


NEUROLOGIC: left facial droop and tongue deviates left.  Left upper extremity 

motor 4/5 throughout. 2-3/5 motor left hip flexion/extension, 4/5 DF. Sensation 

impaired on left.


: penile meatus with some clots. 








Assessment/Plan: 








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 PT/OT/SLP.  


2. Metastatic Prostate Cancer with prostatic bleeding: Marshall 


3. Depression:  Prozac 20mg daily. Ritalin 2.5mg 0700,1100.TSH normal


4. DM: 1630 FS still high but low in AM.  Continue glyburide 5mg am and 2.5 mg 

pm (home dose 5 bid). Continue SSI AC . 


5. DVT Prophylaxis: Heparin held due to penile bleeding


6. Advanced Directives: Full code


7. Dysphagia: regular textures, nectar thick liquids. Free water between meals


8. Constipation: Miralax. Will follow


9. Dispo: Looking at subacute rehab.








07/20/18 10:05

## 2018-07-21 RX ADMIN — INSULIN LISPRO SCH: 100 INJECTION, SOLUTION INTRAVENOUS; SUBCUTANEOUS at 09:17

## 2018-07-21 RX ADMIN — ATORVASTATIN CALCIUM SCH MG: 20 TABLET, FILM COATED ORAL at 17:37

## 2018-07-21 RX ADMIN — GLYBURIDE SCH MG: 2.5 TABLET ORAL at 21:14

## 2018-07-21 RX ADMIN — INSULIN LISPRO SCH: 100 INJECTION, SOLUTION INTRAVENOUS; SUBCUTANEOUS at 17:31

## 2018-07-21 RX ADMIN — ASPIRIN SCH MG: 325 TABLET, COATED ORAL at 09:26

## 2018-07-21 RX ADMIN — CLOPIDOGREL SCH MG: 75 TABLET, FILM COATED ORAL at 09:26

## 2018-07-21 RX ADMIN — DOCUSATE SODIUM SCH: 100 CAPSULE, LIQUID FILLED ORAL at 21:10

## 2018-07-21 RX ADMIN — INSULIN LISPRO SCH UNITS: 100 INJECTION, SOLUTION INTRAVENOUS; SUBCUTANEOUS at 13:07

## 2018-07-21 RX ADMIN — GLYBURIDE SCH MG: 5 TABLET ORAL at 09:26

## 2018-07-21 RX ADMIN — DOCUSATE SODIUM SCH: 100 CAPSULE, LIQUID FILLED ORAL at 09:18

## 2018-07-21 RX ADMIN — FLUOXETINE SCH MG: 20 CAPSULE ORAL at 09:26

## 2018-07-21 RX ADMIN — POLYETHYLENE GLYCOL 3350 SCH: 17 POWDER, FOR SOLUTION ORAL at 09:18

## 2018-07-21 RX ADMIN — LIDOCAINE HYDROCHLORIDE SCH: 20 JELLY TOPICAL at 21:18

## 2018-07-21 RX ADMIN — METHYLPHENIDATE HYDROCHLORIDE SCH MG: 5 TABLET ORAL at 13:08

## 2018-07-21 RX ADMIN — LIDOCAINE HYDROCHLORIDE SCH: 20 JELLY TOPICAL at 17:39

## 2018-07-21 RX ADMIN — LIDOCAINE HYDROCHLORIDE SCH: 20 JELLY TOPICAL at 10:47

## 2018-07-21 RX ADMIN — METHYLPHENIDATE HYDROCHLORIDE SCH MG: 5 TABLET ORAL at 09:27

## 2018-07-21 RX ADMIN — LIDOCAINE HYDROCHLORIDE SCH: 20 JELLY TOPICAL at 15:22

## 2018-07-21 NOTE — PN
Progress Note


Date of Service: 07/21/18


Note: 


CAROLYN DEGROOT was visited. Nursing and therapy notes read and reviewed. 

Overnight he did not sleep well. He felt lower abdominal fullness as he needed 

to urinate. Sandoval was irrigated and clot was cleared. That was repeated this 

morning and more clot cleared. He feels better now and cherry red urine is 

draining.  No chest pain, shortness of breath or abdominal pain.








Current Medications: 


 Active Medications











Generic Name Dose Route Start Last Admin





  Trade Name Freq  PRN Reason Stop Dose Admin


 


Acetaminophen  650 mg  07/07/18 19:55  07/09/18 22:36





  Tylenol Tab*  PO   650 mg





  Q6H PRN   Administration





  PAIN   





     





     





     


 


Aspirin  325 mg  06/27/18 09:00  07/21/18 09:26





  Ecotrin Ec Tab*  PO   325 mg





  DAILY CELESTE   Administration





     





     





     





     


 


Atorvastatin Calcium  20 mg  06/26/18 17:00  07/20/18 17:29





  Lipitor*  PO   20 mg





  1700 CELESTE   Administration





     





     





     





     


 


Bisacodyl  10 mg  06/30/18 12:18  07/11/18 19:59





  Dulcolax Supp*  AR   10 mg





  DAILY PRN   Administration





  CONSTIPATION   





     





     





     


 


Clopidogrel Bisulfate  75 mg  06/27/18 09:00  07/21/18 09:26





  Plavix Tab*  PO   75 mg





  DAILY CELESTE   Administration





     





     





     





     


 


Dextrose  12.5 gm  06/26/18 16:10  





  D50w Syringe 50 Ml*  IV PUSH   





  .FOR FS < 60 - SS PRN   





  FS < 60   





     





     





     


 


Docusate Sodium  100 mg  06/26/18 21:00  07/21/18 09:18





  Colace Cap*  PO   Not Given





  BID CELESTE   





     





     





     





     


 


Fluoxetine HCl  20 mg  07/05/18 09:00  07/21/18 09:26





  Prozac Cap*  PO   20 mg





  DAILY CELESTE   Administration





     





     





     





     


 


Glyburide  5 mg  07/09/18 09:00  07/21/18 09:26





  Diabeta Tab*  PO   5 mg





  DAILY WITH MEAL CELESTE   Administration





     





     





     





     


 


Glyburide  2.5 mg  07/10/18 21:00  07/20/18 20:02





  Diabeta Tab*  PO   2.5 mg





  BEDTIME CELESTE   Administration





     





     





     





     


 


Hydrocortisone  25 mg  07/10/18 13:25  07/19/18 10:10





  Anusol Hc Supp*  AR   25 mg





  BID PRN   Administration





  hemorrhoids   





     





     





     


 


Insulin Human Lispro  0 - 10 units  07/08/18 11:30  07/21/18 09:17





  Humalog*  SUBCUT   Not Given





  AC [facility]   





     





     





  Protocol   





     


 


Lidocaine HCl  1 applic  07/05/18 17:00  07/20/18 20:02





  Lidocaine 2% Jelly*  TOPICAL   Not Given





  QID [facility]   





     





     





     





     


 


Magnesium Hydroxide  30 ml  06/26/18 15:53  07/11/18 16:50





  Milk Of Magnesia Liq*  PO   30 ml





  Q6H PRN   Administration





  CONSTIPATION   





     





     





     


 


Methylphenidate HCl  2.5 mg  07/11/18 07:00  07/21/18 09:27





  Ritalin Tab*  PO   2.5 mg





  0700,1100 [facility]   Administration





     





     





     





     


 


Polyethylene Glycol/Electrolytes  17 gm  06/30/18 13:00  07/21/18 09:18





  Miralax*  PO   Not Given





  DAILY [facility]   





     





     





     





     


 


Senna  2 tab  06/26/18 15:53  07/09/18 22:36





  Senokot Tab*  PO   2 tab





  BEDTIME PRN   Administration





  CONSTIPATION   





     





     





     











Vital Signs: 


 Vital Signs











Temp Pulse Resp BP Pulse Ox


 


 97.8 F   76   14   130/59   96 


 


 07/21/18 04:37  07/21/18 04:37  07/21/18 04:37  07/21/18 04:37  07/21/18 04:37











Lab Results: 


 Laboratory Results - last 24 hr











  07/20/18 07/20/18 07/20/18





  11:50 16:20 19:59


 


POC Glucose (mg/dL)  202 H  210 H  303 H














  07/21/18





  07:35


 


POC Glucose (mg/dL)  128 H











Exam: 





GENERAL: no acute distress. alert and appropriate.


LUNGS: clear bilaterally.


HEART: regular rate and rhythm


ABDOMEN: + bowel sounds, soft, non-tender, non-distended.


EXTREMITIES: no edema.


NEUROLOGIC: left facial droop and tongue deviates left.  Left upper extremity 

motor 4/5 throughout. 2-3/5 motor left hip flexion/extension, 4/5 DF. Sensation 

impaired on left.


: penile meatus is clean.  There is cherry red urine draining from sandoval. 








Assessment/Plan: 








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 PT/OT/SLP.  


2. Metastatic Prostate Cancer with prostatic bleeding: Sandoval. I d/w Dr. Bacon hematuria. Ultimately may need scope.  Keep current sandoval for now, but 

it keeps clotting will consider larger guage and more irrigation. 


3. Depression:  Prozac 20mg daily. Ritalin 2.5mg 0700,1100.TSH normal


4. DM: AM fingerstick better today.  Continue glyburide 5mg am and 2.5 mg pm (

home dose 5 bid). Continue SSI AC . 


5. DVT Prophylaxis: Heparin held due to prostatic bleeding


6. Advanced Directives: Full code


7. Dysphagia: regular textures, nectar thick liquids. Free water between meals


8. Constipation: Miralax. Will follow


9. Dispo: Looking at subacute rehab.








07/21/18 10:37

## 2018-07-22 RX ADMIN — POLYETHYLENE GLYCOL 3350 SCH: 17 POWDER, FOR SOLUTION ORAL at 09:36

## 2018-07-22 RX ADMIN — LIDOCAINE HYDROCHLORIDE SCH: 20 JELLY TOPICAL at 12:44

## 2018-07-22 RX ADMIN — FLUOXETINE SCH MG: 20 CAPSULE ORAL at 09:33

## 2018-07-22 RX ADMIN — DOCUSATE SODIUM SCH MG: 100 CAPSULE, LIQUID FILLED ORAL at 19:50

## 2018-07-22 RX ADMIN — GLYBURIDE SCH MG: 2.5 TABLET ORAL at 19:50

## 2018-07-22 RX ADMIN — METHYLPHENIDATE HYDROCHLORIDE SCH MG: 5 TABLET ORAL at 06:26

## 2018-07-22 RX ADMIN — LIDOCAINE HYDROCHLORIDE SCH: 20 JELLY TOPICAL at 09:36

## 2018-07-22 RX ADMIN — ATORVASTATIN CALCIUM SCH MG: 20 TABLET, FILM COATED ORAL at 17:45

## 2018-07-22 RX ADMIN — INSULIN LISPRO SCH UNITS: 100 INJECTION, SOLUTION INTRAVENOUS; SUBCUTANEOUS at 17:45

## 2018-07-22 RX ADMIN — INSULIN LISPRO SCH: 100 INJECTION, SOLUTION INTRAVENOUS; SUBCUTANEOUS at 07:32

## 2018-07-22 RX ADMIN — METHYLPHENIDATE HYDROCHLORIDE SCH MG: 5 TABLET ORAL at 12:20

## 2018-07-22 RX ADMIN — GLYBURIDE SCH MG: 5 TABLET ORAL at 09:33

## 2018-07-22 RX ADMIN — ASPIRIN SCH MG: 325 TABLET, COATED ORAL at 09:33

## 2018-07-22 RX ADMIN — CLOPIDOGREL SCH MG: 75 TABLET, FILM COATED ORAL at 09:33

## 2018-07-22 RX ADMIN — INSULIN LISPRO SCH UNITS: 100 INJECTION, SOLUTION INTRAVENOUS; SUBCUTANEOUS at 12:20

## 2018-07-22 RX ADMIN — MINERAL OIL, PETROLATUM, PHENYLEPHRINE HCL PRN APPLIC: 14; 74.9; .25 OINTMENT RECTAL at 19:49

## 2018-07-22 RX ADMIN — LIDOCAINE HYDROCHLORIDE SCH: 20 JELLY TOPICAL at 19:49

## 2018-07-22 RX ADMIN — DOCUSATE SODIUM SCH: 100 CAPSULE, LIQUID FILLED ORAL at 09:35

## 2018-07-22 RX ADMIN — MINERAL OIL, PETROLATUM, PHENYLEPHRINE HCL PRN APPLIC: 14; 74.9; .25 OINTMENT RECTAL at 14:29

## 2018-07-22 RX ADMIN — HYDROCORTISONE ACETATE PRN MG: 25 SUPPOSITORY RECTAL at 09:37

## 2018-07-22 RX ADMIN — LIDOCAINE HYDROCHLORIDE SCH: 20 JELLY TOPICAL at 17:05

## 2018-07-22 NOTE — PN
Progress Note


Date of Service: 07/22/18


Note: 


CAROLYN DEGROOT was visited. Nursing notes read and reviewed. No therapy 

yesterday.  Dr. Bacon came in and changed sandoval yesterday afternoon and was 

in this morning also to f/u. He ordered flushes to keep sandoval patent pending 

likely cystoscopy on Wednesday.  No chest pain, shortness of breath or 

abdominal pain.  He would like cream for his hemorrhoids instead of suppository.








Current Medications: 


 Active Medications











Generic Name Dose Route Start Last Admin





  Trade Name Freq  PRN Reason Stop Dose Admin


 


Acetaminophen  650 mg  07/07/18 19:55  07/09/18 22:36





  Tylenol Tab*  PO   650 mg





  Q6H PRN   Administration





  PAIN   





     





     





     


 


Aspirin  325 mg  06/27/18 09:00  07/22/18 09:33





  Ecotrin Ec Tab*  PO   325 mg





  DAILY CELESTE   Administration





     





     





     





     


 


Atorvastatin Calcium  20 mg  06/26/18 17:00  07/21/18 17:37





  Lipitor*  PO   20 mg





  1700 CELESTE   Administration





     





     





     





     


 


Bisacodyl  10 mg  06/30/18 12:18  07/11/18 19:59





  Dulcolax Supp*  [place]   10 mg





  DAILY PRN   Administration





  CONSTIPATION   





     





     





     


 


Clopidogrel Bisulfate  75 mg  06/27/18 09:00  07/22/18 09:33





  Plavix Tab*  PO   75 mg





  DAILY CELESTE   Administration





     





     





     





     


 


Dextrose  12.5 gm  06/26/18 16:10  





  D50w Syringe 50 Ml*  IV PUSH   





  .FOR FS < 60 - SS PRN   





  FS < 60   





     





     





     


 


Docusate Sodium  100 mg  06/26/18 21:00  07/22/18 09:35





  Colace Cap*  PO   Not Given





  BID CELESTE   





     





     





     





     


 


Fluoxetine HCl  20 mg  07/05/18 09:00  07/22/18 09:33





  Prozac Cap*  PO   20 mg





  DAILY CELESTE   Administration





     





     





     





     


 


Glyburide  5 mg  07/09/18 09:00  07/22/18 09:33





  Diabeta Tab*  PO   5 mg





  DAILY WITH MEAL CELESTE   Administration





     





     





     





     


 


Glyburide  2.5 mg  07/10/18 21:00  07/21/18 21:14





  Diabeta Tab*  PO   2.5 mg





  BEDTIME CELESTE   Administration





     





     





     





     


 


Hydrocortisone  25 mg  07/10/18 13:25  07/22/18 09:37





  Anusol Hc Supp*  [place]   25 mg





  BID PRN   Administration





  hemorrhoids   





     





     





     


 


Insulin Human Lispro  0 - 10 units  07/08/18 11:30  07/22/18 07:32





  Humalog*  SUBCUT   Not Given





  AC [facility]   





     





     





  Protocol   





     


 


Lidocaine HCl  1 applic  07/05/18 17:00  07/22/18 09:36





  Lidocaine 2% Jelly*  TOPICAL   Not Given





  QID [facility]   





     





     





     





     


 


Magnesium Hydroxide  30 ml  06/26/18 15:53  07/11/18 16:50





  Milk Of Magnesia Liq*  PO   30 ml





  Q6H PRN   Administration





  CONSTIPATION   





     





     





     


 


Methylphenidate HCl  2.5 mg  07/11/18 07:00  07/22/18 06:26





  Ritalin Tab*  PO   2.5 mg





  0700,1100 [facility]   Administration





     





     





     





     


 


Polyethylene Glycol/Electrolytes  17 gm  06/30/18 13:00  07/22/18 09:36





  Miralax*  PO   Not Given





  DAILY [facility]   





     





     





     





     


 


Senna  2 tab  06/26/18 15:53  07/09/18 22:36





  Senokot Tab*  PO   2 tab





  BEDTIME PRN   Administration





  CONSTIPATION   





     





     





     











Vital Signs: 


 Vital Signs











Temp Pulse Resp BP Pulse Ox


 


 98.4 F   71   18   120/58   96 


 


 07/22/18 06:24  07/22/18 06:24  07/22/18 06:24  07/22/18 06:24  07/22/18 06:24











Lab Results: 


 Laboratory Results - last 24 hr











  07/21/18 07/21/18 07/21/18





  11:39 16:59 20:54


 


POC Glucose (mg/dL)  160 H  122 H  168 H














  07/22/18





  07:26


 


POC Glucose (mg/dL)  98











Exam: 





GENERAL: no acute distress. alert and appropriate.


LUNGS: clear bilaterally.


HEART: regular rate and rhythm


ABDOMEN: + bowel sounds, soft, non-tender, non-distended.


EXTREMITIES: no edema.


NEUROLOGIC: left facial droop and tongue deviates left.  Left upper extremity 

motor 4/5 throughout. 2-3/5 motor left hip flexion/extension, 4/5 DF. Sensation 

impaired on left.


: penile meatus is clean.  There is dark red urine draining from sandoval. 








Assessment/Plan: 








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 PT/OT/SLP.  


2. Metastatic Prostate Cancer with prostatic bleeding: Sandoval changed by Dr. Bacon on 7/21 and he f/u this morning. He anticipates diagnostic cystoscopy 

on Wednesday.  Flush sandoval prn to keep patent. 


3. Depression:  Prozac 20mg daily. Ritalin 2.5mg 0700,1100. TSH normal


4. DM: Continue glyburide 5mg am and 2.5 mg pm (home dose 5 bid). Continue SSI 

AC . 


5. DVT Prophylaxis: Heparin held due to prostatic bleeding


6. Advanced Directives: Full code


7. Dysphagia: regular textures, nectar thick liquids. Free water between meals


8. Constipation: Miralax. Will follow


9. Hemorrhoids: will order cream and d/c suppository


10. Dispo: Looking at subacute rehab, but d/c should be after cystoscopy if 

possible








07/22/18 10:43

## 2018-07-22 NOTE — CONS
CC:  Dr. Lavon Ibrahim.*

 

 CONSULTATION NOTE:

 

DATE OF CONSULT:  07/22/18

 

LOCATION:  The patient is in the rehab unit in Bed 247.

 

HISTORY OF PRESENT ILLNESS:  I was asked by Dr. Mota and Dr. Jackson to 
see this 85-year-old white male because of persistent gross hematuria.  

I had seen Mr. Garcia back in December 2017 referred by Dr. Ibrahim because of 
an elevated PSA of 52.  At that time, rectal examination showed an indurated 
prostate.  He underwent a transrectal ultrasound and multiple prostate  
biopsies in January 2018.  The pathology showed bulky Matt 8 and 9 
adenocarcinoma.

 

He had a metastatic workup with a bone scan, which showed a solitary metastatic 
lesion in the right ischium, and a CT of the abdomen and pelvis, which showed 
normal kidneys, no hydronephrosis or renal masses, and no lymphadenopathy or 
metastatic disease.  The prostate was enlarged and there was thickening of the 
bladder wall.

 

The patient was then started on hormone ablation therapy starting with Casodex 
and then putting him on Lupron shots.  He responded very well and in May 2018, 
his PSA had dropped from 52 to 0.2. In May 2018, he received Lupron 30 mg IM, 
and was scheduled for follow up visit with PSA and Lupron shot in September.

 

On his evaluation in my office about two months ago, his urine analysis was 
negative and the postvoid bladder ultrasound showed a residual of about 60 cc.

The patient denies any past history of gross hematuria or urinary tract 
infections.

Patient is a diabetic and is maintained on glyburide. 



He was admitted to the hospital on 06/22/18 with a right pontine stroke that 
resulted in left hemiparesis.  He was also noted at that time to be in partial 
retention, and to have a urinary tract infection. Urine culture grew 
Citrobacter.  He was managed for his stroke and was started on Plavix 75 mg 
daily and on aspirin 325 mg daily.

 

After his partial recovery from the stroke, he was transferred to the rehab 
unit at [facility].  He had been on catheter drainage.  

Gross hematuria was noted and this has been present on and off since his 
admission to the rehab unit about four weeks ago.  Initially, the hematuria was 
felt to be secondary to the Marshall catheter and to the anticoagulation and he 
was managed conservatively.  The hematuria, however, has persisted and I had to 
irrigate his catheter a few times.



A cystoscopy is indicated to rule out any bladder lesions causing the hematuria 
and to see if there is any lesion that can be treated to control the hematuria.
  Considering the recent stroke and the fact that the patient is on 
anticoagulation, he is not a candidate for any anesthesia or TURP and the plan 
is to do the cystoscopy under local anesthesia with anesthesia monitoring.  
This will be planned to be done in a few days before his planned discharge to 
[facility].

 

 [id number]/CPS #: [id number]

[email or web address]

## 2018-07-23 RX ADMIN — INSULIN LISPRO SCH: 100 INJECTION, SOLUTION INTRAVENOUS; SUBCUTANEOUS at 17:11

## 2018-07-23 RX ADMIN — LIDOCAINE HYDROCHLORIDE SCH: 20 JELLY TOPICAL at 17:11

## 2018-07-23 RX ADMIN — METHYLPHENIDATE HYDROCHLORIDE SCH MG: 5 TABLET ORAL at 11:40

## 2018-07-23 RX ADMIN — CLOPIDOGREL SCH MG: 75 TABLET, FILM COATED ORAL at 07:57

## 2018-07-23 RX ADMIN — INSULIN LISPRO SCH UNITS: 100 INJECTION, SOLUTION INTRAVENOUS; SUBCUTANEOUS at 11:42

## 2018-07-23 RX ADMIN — METHYLPHENIDATE HYDROCHLORIDE SCH MG: 5 TABLET ORAL at 06:40

## 2018-07-23 RX ADMIN — LIDOCAINE HYDROCHLORIDE SCH: 20 JELLY TOPICAL at 13:57

## 2018-07-23 RX ADMIN — POLYETHYLENE GLYCOL 3350 SCH: 17 POWDER, FOR SOLUTION ORAL at 07:58

## 2018-07-23 RX ADMIN — DOCUSATE SODIUM SCH MG: 100 CAPSULE, LIQUID FILLED ORAL at 20:15

## 2018-07-23 RX ADMIN — FLUOXETINE SCH MG: 20 CAPSULE ORAL at 07:57

## 2018-07-23 RX ADMIN — ASPIRIN SCH MG: 325 TABLET, COATED ORAL at 07:57

## 2018-07-23 RX ADMIN — DOCUSATE SODIUM SCH MG: 100 CAPSULE, LIQUID FILLED ORAL at 07:57

## 2018-07-23 RX ADMIN — LIDOCAINE HYDROCHLORIDE SCH: 20 JELLY TOPICAL at 20:39

## 2018-07-23 RX ADMIN — GLYBURIDE SCH MG: 5 TABLET ORAL at 07:58

## 2018-07-23 RX ADMIN — ATORVASTATIN CALCIUM SCH MG: 20 TABLET, FILM COATED ORAL at 17:35

## 2018-07-23 RX ADMIN — GLYBURIDE SCH MG: 2.5 TABLET ORAL at 20:15

## 2018-07-23 RX ADMIN — INSULIN LISPRO SCH: 100 INJECTION, SOLUTION INTRAVENOUS; SUBCUTANEOUS at 07:56

## 2018-07-23 RX ADMIN — LIDOCAINE HYDROCHLORIDE SCH: 20 JELLY TOPICAL at 08:17

## 2018-07-23 NOTE — PN
Progress Note


Date of Service: 07/23/18


Note: 


CAROLYN DEGROOT was visited. Therapy notes read and reviewed. He is scheduled for 

a cystoscopy on Wednesday morning. MAy need to go swing prior. He still feels 

like he has no energy








Current Medications: 


 Active Medications











Generic Name Dose Route Start Last Admin





  Trade Name Freq  PRN Reason Stop Dose Admin


 


Acetaminophen  650 mg  07/07/18 19:55  07/09/18 22:36





  Tylenol Tab*  PO   650 mg





  Q6H PRN   Administration





  PAIN   





     





     





     


 


Aspirin  325 mg  06/27/18 09:00  07/23/18 07:57





  Ecotrin Ec Tab*  PO   325 mg





  DAILY CELESTE   Administration





     





     





     





     


 


Atorvastatin Calcium  20 mg  06/26/18 17:00  07/22/18 17:45





  Lipitor*  PO   20 mg





  1700 CELESTE   Administration





     





     





     





     


 


Bisacodyl  10 mg  06/30/18 12:18  07/11/18 19:59





  Dulcolax Supp*  ID   10 mg





  DAILY PRN   Administration





  CONSTIPATION   





     





     





     


 


Clopidogrel Bisulfate  75 mg  06/27/18 09:00  07/23/18 07:57





  Plavix Tab*  PO   75 mg





  DAILY CELESTE   Administration





     





     





     





     


 


Dextrose  12.5 gm  06/26/18 16:10  





  D50w Syringe 50 Ml*  IV PUSH   





  .FOR FS < 60 - SS PRN   





  FS < 60   





     





     





     


 


Docusate Sodium  100 mg  06/26/18 21:00  07/23/18 07:57





  Colace Cap*  PO   100 mg





  BID CELESTE   Administration





     





     





     





     


 


Fluoxetine HCl  20 mg  07/05/18 09:00  07/23/18 07:57





  Prozac Cap*  PO   20 mg





  DAILY CELESTE   Administration





     





     





     





     


 


Glyburide  5 mg  07/09/18 09:00  07/23/18 07:58





  Diabeta Tab*  PO   5 mg





  DAILY WITH MEAL CELESTE   Administration





     





     





     





     


 


Glyburide  2.5 mg  07/10/18 21:00  07/22/18 19:50





  Diabeta Tab*  PO   2.5 mg





  BEDTIME CELESTE   Administration





     





     





     





     


 


Insulin Human Lispro  0 - 10 units  07/08/18 11:30  07/23/18 11:42





  Humalog*  SUBCUT   4 units





  AC CELESTE   Administration





     





     





  Protocol   





     


 


Lidocaine HCl  1 applic  07/05/18 17:00  07/23/18 13:57





  Lidocaine 2% Jelly*  TOPICAL   Not Given





  QID CELESTE   





     





     





     





     


 


Magnesium Hydroxide  30 ml  06/26/18 15:53  07/11/18 16:50





  Milk Of Magnesia Liq*  PO   30 ml





  Q6H PRN   Administration





  CONSTIPATION   





     





     





     


 


Methylphenidate HCl  2.5 mg  07/11/18 07:00  07/23/18 11:40





  Ritalin Tab*  PO   2.5 mg





  0700,1100 CELESTE   Administration





     





     





     





     


 


Phenyleph/Shark Oil/Min Oil/Petrol  1 applic  07/22/18 10:49  07/22/18 19:49





  Preparation H*  ID   1 applic





  TID PRN   Administration





  hemorrhoid irritation   





     





     





     


 


Polyethylene Glycol/Electrolytes  17 gm  06/30/18 13:00  07/23/18 07:58





  Miralax*  PO   Not Given





  DAILY CELESTE   





     





     





     





     


 


Senna  2 tab  06/26/18 15:53  07/09/18 22:36





  Senokot Tab*  PO   2 tab





  BEDTIME PRN   Administration





  CONSTIPATION   





     





     





     











Vital Signs: 


 Vital Signs











Temp Pulse Resp BP Pulse Ox


 


 98.6 F   80   22   106/64   98 


 


 07/23/18 15:31  07/23/18 15:31  07/23/18 15:31  07/23/18 15:31  07/23/18 15:31











Lab Results: 


 Laboratory Results - last 24 hr











  07/22/18 07/22/18 07/23/18





  16:31 20:04 07:44


 


POC Glucose (mg/dL)  236 H  163 H  69 L














  07/23/18 07/23/18 07/23/18





  08:06 09:08 11:20


 


POC Glucose (mg/dL)  80  115 H  212 H














  07/23/18





  16:24


 


POC Glucose (mg/dL)  99











Exam: 





GENERAL: no acute distress. alert and appropriate.


LUNGS: clear bilaterally.


HEART: regular rate and rhythm


ABDOMEN: + bowel sounds, soft, non-tender, non-distended.


EXTREMITIES: no edema.


NEUROLOGIC: left facial droop and tongue deviates left.  Left motor shows good 

arm movement,  some movement in leg. Sensation impaired on left.


: hematuria


Assessment/Plan: 








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 PT/OT/SLP.  


2. Metastatic Prostate Cancer with prostatic bleeding: Sandoval changed by Dr. Bacon on 7/21 and he f/u this morning. He anticipates diagnostic cystoscopy 

on Wednesday.  Flush sandoval prn to keep patent. 


3. Depression:  Prozac 20mg daily. Ritalin 2.5mg 0700,1100. TSH normal


4. DM: Continue glyburide 5mg am and 2.5 mg pm (home dose 5 bid). Continue SSI 

AC . 


5. DVT Prophylaxis: Heparin held due to prostatic bleeding


6. Advanced Directives: Full code


7. Dysphagia: regular textures, nectar thick liquids. Free water between meals


8. Constipation: Miralax. Will follow


9. Hemorrhoids: Preparation H


10. Disposition: Looking at subacute rehab, but d/c should be after cystoscopy 

if possible











07/23/18 16:44





07/23/18 16:45

## 2018-07-24 RX ADMIN — POLYETHYLENE GLYCOL 3350 SCH: 17 POWDER, FOR SOLUTION ORAL at 08:29

## 2018-07-24 RX ADMIN — LIDOCAINE HYDROCHLORIDE SCH: 20 JELLY TOPICAL at 19:41

## 2018-07-24 RX ADMIN — CLOPIDOGREL SCH MG: 75 TABLET, FILM COATED ORAL at 08:25

## 2018-07-24 RX ADMIN — ATORVASTATIN CALCIUM SCH MG: 20 TABLET, FILM COATED ORAL at 18:42

## 2018-07-24 RX ADMIN — METHYLPHENIDATE HYDROCHLORIDE SCH MG: 5 TABLET ORAL at 08:25

## 2018-07-24 RX ADMIN — ASPIRIN SCH MG: 325 TABLET, COATED ORAL at 08:25

## 2018-07-24 RX ADMIN — INSULIN LISPRO SCH UNITS: 100 INJECTION, SOLUTION INTRAVENOUS; SUBCUTANEOUS at 12:21

## 2018-07-24 RX ADMIN — GLYBURIDE SCH MG: 2.5 TABLET ORAL at 19:40

## 2018-07-24 RX ADMIN — INSULIN LISPRO SCH: 100 INJECTION, SOLUTION INTRAVENOUS; SUBCUTANEOUS at 08:28

## 2018-07-24 RX ADMIN — INSULIN LISPRO SCH UNITS: 100 INJECTION, SOLUTION INTRAVENOUS; SUBCUTANEOUS at 17:32

## 2018-07-24 RX ADMIN — DOCUSATE SODIUM SCH MG: 100 CAPSULE, LIQUID FILLED ORAL at 19:40

## 2018-07-24 RX ADMIN — GLYBURIDE SCH MG: 5 TABLET ORAL at 08:25

## 2018-07-24 RX ADMIN — SENNOSIDES PRN TAB: 8.6 TABLET, FILM COATED ORAL at 19:39

## 2018-07-24 RX ADMIN — LIDOCAINE HYDROCHLORIDE SCH: 20 JELLY TOPICAL at 08:29

## 2018-07-24 RX ADMIN — DOCUSATE SODIUM SCH MG: 100 CAPSULE, LIQUID FILLED ORAL at 08:25

## 2018-07-24 RX ADMIN — LIDOCAINE HYDROCHLORIDE SCH: 20 JELLY TOPICAL at 12:23

## 2018-07-24 RX ADMIN — LIDOCAINE HYDROCHLORIDE SCH: 20 JELLY TOPICAL at 18:42

## 2018-07-24 RX ADMIN — FLUOXETINE SCH MG: 20 CAPSULE ORAL at 08:25

## 2018-07-24 RX ADMIN — METHYLPHENIDATE HYDROCHLORIDE SCH MG: 5 TABLET ORAL at 12:19

## 2018-07-24 NOTE — PMRUTEAM
PMRU: Team Meeting


Current Status: 


 Nursing: Current Status











Skin Deviations [Right Lower   Abrasion





Lateral Knee]                  


 


Skin Deviations [Bilateral     Other





Buttocks]                      


 


Skin Deviations [Midline Back] Abrasion


 


Skin Deviations [Bilateral     Other





Foot]                          


 


Skin Deviation Description [   healing





Right Lower Lateral Knee]      


 


Skin Deviation Description [   bottom pink





Bilateral Buttocks]            


 


Skin Deviation Description [   old scar d/t previous fall PTA- unchanged





Midline Back]                  


 


Skin Deviation Description [   dry skin





Bilateral Foot]                


 


Drain Type [Bilateral Buttocks None





]                              


 


Bladder Current Status         chronic indwelling catheter


 


Bowel Current Status           continues bowel meds


 


Nutrition Current Status       aspiration precautions, nectar thick liquids,





 eating with extra time


 


Medication Current Status      needs reinforement











 Physical Therapy: Current Status











Bed Mobility Assistance        Not Tested


 


Transfer Moblility Assistance  Contact Guard Assist,Not Tested


 


Transfer/Bed Mobility          Rolling Walker





Recommended Devices            


 


Ambulation Assistance          Contact Guard Assist


 


Ambulation Assistive Devices   Rolling Walker


 


Number of Feet Patient         40'





Ambulated                      


 


Stairs Assistance              Not Tested


 


Stairs Recommended Devices     Two Rails


 


Number of Stairs               8


 


Curb                           Not Tested


 


Objective Comments             sitting balance with one hand supported, reaching





 opposite hand outside base of support and across





 body switching between L/R for improved trunk





 control and upright posture














 Occupational Therapy: Current Status











Upper Body Dressing            Min Assist


 


Upper Body Dressing Progress   set-up with signicantly extra time, sometimes





 needs A to pull around back


 


Lower Body Dressing            Max Asst


 


Lower Body Dressing Progress   pt Ithreads catheter through and dons depends,





 dons/dofs socks I


 


Bathing                        Min Assist


 


Bathing Progress               assistance for buttocks and feet


 


Toileting                      Total Assist


 


Toilet Transfer                Min Assist,2 Person Assist


 


Toilet Transfer Progress       2 min A pivot to BSC


 


Shower Transfer                Total Assist,2 Person Assist


 


Shower Transfer Progress       EZ stand


 


Eating                         Supervision


 


Eating Progress                set-up with extra time














 Rec Therapy: Current Status











Summary of Assessment and      RT assessment is complete and patient is aware of





Clinical Impression            leisure services. Pt. has been engaged in his





 computer programs during free-time and during





 leisure visits with writer.  Felix garner during





 these times and is interested in continued leisure





 activities on the unit.


 


Treatment Goals                Patient will engage in recreation and leisure





 activities while on the unit


 


Treatment Plan                 Provide and encourage involvement in RT services














 Social Work: Current Status











Discharge Plan                 return home with home care svs and family support


 


Potential for Family Training  pt's son is involved and supportive


 


Anticipated Discharge          Home





Destination                    


 


Discharge With                 home care svs and family support














 Nutrition: Current Status











Monitoring                     Variable intake (15-80%) and declined some meals





 this weekend due to pain.  AM hypoglycemia trend





 and HS snack added to help prevent.  Liquid/loose





 BM 7/20 now soft - no further constipation.





 Declined glucerna shakes/ensure pudding and these





 were d/c. Encourage overall intake to maximize





 caloric intake and prevent hypoglycemia.





 Continues with regular texture, nectar thick





 liquids.











 Speech: Current Status











Assessment                     Patient is progressing slowly as expected. 

Patient





 continues to demonstrate intermittent clinical s/





 s laryngeal penetration of thin liquids when





 carefully taking small sips, but less frequently





 and without protracted coughing indicative of





 aspiration.


 


Speech Current Status Goal 1   Mild-moderate dysphagia


 


Speech Goal 2 Current Status   Moderate impairment (6/30/18)


 


Speech Goal 3 Current Status   Moderate impairment (6/30/18)














Goals: 


 Physical Therapy: Updated Goals











Transfer/Bed Mobility          Hand Hold





Recommended Devices            














 Occupational Therapy: Initial Goals











Goals to be Completed in (Days 21-28





)                              


 


Upper Body Bathing Routine     Modified Independent with


 


Lower Body Bathing Routine     Modified Independent with


 


Upper Body Dressing Routine    Modified Independent with


 


Lower Body Dressing Routine    Modified Independent with


 


Toilet Hygeine and Clothing    Modified Independent with





Management Routine             


 


Toilet Transfer Routine        Modified Independent with


 


Tub Transfer Routine           Modified Independent with


 


Functional Transfers for ADL   Modified Independent with


 


Grooming Routine               Independent


 


Feeding Routine                Modified Independent with


 


Light Housekeeping Tasks       Moderate Assist














 Nursing: Goals











Bladder Goal                   chronic indwelling catheter


 


Bowel Goal                     indep


 


Nutrition Goal                 indep


 


Medication Goal                indep














 Nutrition: Goals











Intervention Goals             1. pt will tolerate least-restrictive diet 

texture





 without evidence difficulty chewing/swallowing





 2. adequate po intake to support stable wt without





 further wt loss





 3. adequate fluid intake to prevent clinical s/sx





 dehydration





 4. adequate glycemic control per inpt parameters;





 no s/sx hypo- or hyperglycemia





 5. pt will avoid grapefruit/grapefruit juice on





 statin therapy











 Speech: Goals











Speech Goal 1                  Swallowing


 


Speech Evaluation Status Goal  Mild-moderate dysphagia





1                              


 


Speech Current Status Goal 1   Mild-moderate dysphagia


 


Goal 1 Comments                Long-Term Goal: Patient will tolerate least





 restrictive diet consistencies w/ no clinical s/s





 aspiration





 Short Term Goals:





 1)  STG: Pt will tolerate nectar thick liquids w/





 no clinical s/s aspiration.





 Status: Met.





 STG: Pt will tolerate thin liquids w/ no clinical





 s/s aspiration.





 Status:  Progressing as expected.





 Not address today.  Reviewed swallowing safety





 strategies.


 


Speech Goal 2                  Memory


 


Speech Goal 2 Current Status   Moderate impairment (6/30/18)


 


Speech Goal 2 Comments         Long-Term Goal: Pt will use compensatory





 strategies to encode and retrieve 4/4 new items or





 compensatory stategy steps after delay of one day





 , Independently, for independence in mobility





 safety, ADLs and community access.





 Short-term Goal:  Pt will use compensatory





 strategies to encode and retrieve 3/4 new items or





 compensatory stategy steps  after delay of 10





 minutes, given Moderate skilled instruction and





 cueing.





 Status:  Progressing as expected.





 SLP provided verbal and printed instructions for





 transfer safety, and directed patient to use





 memory strategies with moderate cueing





 Given skilled instruction and moderate cueing to





 use forward chaining, immediate recall, and spaced





 retrieval methods, patient encoded and retrieved





 4/5 transfer safety steps for standing and 3/3





 steps for walking immediately after reading I'ly.





 Patient recalled steps for sitting from walker, 4/





 4 immediately and after intervlsas up to 1 minute,





 and 3/4 after delay of 5 minutes.


 


Speech Goal 3                  Problem Solving


 


Speech Goal 3 Current Status   Moderate impairment (6/30/18)


 


Speech Goal 3 Comments         Long-Term Goal: Pt will use compensatory





 strategies to solve moderately complex routine





 problems, with 100% accuracy, Independently, for





 ADLs such as shopping, time and money management.





 Short-Term Goal: Pt will use compensatory





 strategies to solve moderately complex routine





 problems, with 80% accuracy, given Moderate





 skilled instruction and cueing.





 Status:  Progressing as expected.





 Goal not targeted today.














 Social Work: Goals











Discharge Plan                 return home with home care svs and family support


 


Potential for Family Training  pt's son is involved and supportive


 


Anticipated Discharge          Home





Destination                    


 


Discharge With                 home care svs and family support

















Care Plan: 


 Care Plan





ADL's - Improve/Maintain                Start:  06/27/18 14:48              


Freq:   DAILY                           Status: Active      Target:         


Protocol:                                                                   








Activity Type Activity Date Activity User E-Sign Co-Sign Detail





Recorded Client Recorded Date Recorded By   


 


Document 07/18/18 12:52 [id number]   





[id number] 07/18/18 12:53 [id number]   














  07/18/18





  12:52


 


PMRU Outcome: ADL's/ADL Transfers 


 


Orders/Interventions Occupational





 Therapy





 Evaluation &





 Treatment





 Communication





 Tool in Patient





 Room


 


Device Yes


 


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


 


Patient to receive OT 5x/wk for  Therex





min/day Self Care





 Management





 Group Therapy





 Neuromuscular





 ReEducation


 


UE/LE ADL's with Assist Yes: Raymond


 


ADL Transfers with Assist Yes: Raymond


 


Toileting: Transfers,Clothing Management Yes: Raymond





,Hygeine w/Assist 


 


Light Kitchen/Laundry w/Assist Yes: modA


 


Progression Toward Outcome/Goals Progressing


 


Outcome/Goals Met Pt continues to





 have





 difficulty





 initiating





 during therapy





 session and





 frequently





 espresses the





 belief that he





 cannot perform





 an activity





 requested by





 OTS.  As





 mentioned above





 , pt had





 difficulty





 maintaining LE





 strength needed





 to stand using





 RW, but showed





 improvements





 in sitting





 balance.








Communication-Improve/Maintain          Start:  06/29/18 10:06              


Freq:   DAILY                           Status: Active      Target:         


Protocol:                                                                   








Activity Type Activity Date Activity User E-Sign Co-Sign Detail





Recorded Client Recorded Date Recorded By   


 


Document 07/23/18 01:43 [id number]   





[id number]-C03 07/23/18 01:44 [id number]   














  07/23/18





  01:43


 


PMRU Outcome: Communication/Cognitive 





Status 


 


Outcome/Goals Makes Needs





 Known





 Effectively


 


Other Outcomes/Goals Swallowing





 Long-Term Goal:





 Patient will





 tolerate least





 restrictive





 diet





 consistencies w





 / no clinical s





 /s aspiration





 Short Term





 Goals:





 STG 1: Pt will





 tolerate nectar





 thick liquids





 w/ no clinical





 s/s aspiration.





 Status: Met.





 STG 2: Pt will





 tolerate thin





 liquids w/ no





 clinical s/s





 aspiration.





 Status:





 Progressing as





 expected





 Memory





 Long-Term Goal:





 Pt will use





 compensatory





 strategies to





 encode and





 retrieve 4/4





 new items or





 compensatory





 stategy steps





 after delay of





 one day,





 Independently,





 for





 independence in





 mobility





 safety,





 ADLs and





 community





 access.





 Short-term Goal





 :  Pt will use





 compensatory





 strategies to





 encode and





 retrieve 3/4





 new items or





 compensatory





 stategy steps





 after delay of





 10 minutes,





 given Moderate





 skilled





 instruction and





 cueing.





 Status:





 Progressing as





 expected.





 Problem Solving





 Long-Term Goal:





 Pt will use





 compensatory





 strategies to





 solve





 moderately





 complex routine





 problems, with





 100% accuracy,





 Independently,





 for ADLs such





 as shopping,





 time and money





 management.





 Short-Term Goal





 : Pt will use





 compensatory





 strategies to





 solve





 moderately





 complex routine





 problems, with





 80% accuracy,





 given Moderate





 skilled





 instruction and





 cueing.





 Status:





 Progressing as





 expected.


 


Progression Toward Outcomes/Goals Progressing








Coping/Psych-Improve/Maintain           Start:  06/26/18 16:22              


Freq:   QSHIFT                          Status: Active      Target:         


Protocol:                                                                   








Activity Type Activity Date Activity User E-Sign Co-Sign Detail





Recorded Client Recorded Date Recorded By   


 


Document 07/24/18 09:43 [id number]   





[id number]-M07 07/24/18 09:44 [id number]   














  07/24/18





  09:43


 


PMRU Outcome: Coping/Psychosocial 


 


Coping Outcome/Goals Verbalization





 of Acceptance





 of Rehab Admit





 Willingness to





 Participate in





 Treatment Plan





 and Basic Needs





 Utilization of





 Available





 Support Systems





 Absence of





 Destructive





 Behavior to





 Self/Others


 


Psychosocial Outcome/Goals Maintain/





 Improve





 Emotional





 Health





 Demonstrates





 Knowledge of





 Healthy Coping





 Mechanisms





 Available





 Cooperate/





 Participate in





 Plan


 


Progression Toward Outcome/Goals - Progressing





Coping 


 


Progression Toward Outcome/Goals - Progressing





Psychosocial 


 


Outcome/Goals Met Comment patient appears





 depressed and





 quiet








DVT Prophylaxis- Improve/Maintain       Start:  06/26/18 16:22              


Freq:   QSHIFT                          Status: Complete    Target:         


Protocol:                                                                   








Activity Type Activity Date Activity User E-Sign Co-Sign Detail





Recorded Client Recorded Date Recorded By   


 


Document 07/19/18 20:00 [id number]   





[id number]-C03 07/19/18 22:45 [id number]   














  07/19/18





  20:00


 


PMRU Outcome: DVT Prophylaxis 


 


Outcome/Goals Remains Free of





 DVT





 Complies with





 DVT Prophylaxis





 /Treatment





 Demonstrates





 Knowledge of





 DVT Prevention/





 Treatment





 TEDS Stockings





 on Every AM,





 Off at HS


 


Outcome/Goals Met Complies with





 DVT Prophylaxis





 /Treatment





 TEDS Stockings





 on Every AM,





 Off at HS








Discharge Planning - Improve/Maintain   Start:  06/26/18 16:22              


Freq:   DAILY@1200                      Status: Active      Target:         


Protocol:                                                                   








Activity Type Activity Date Activity User E-Sign Co-Sign Detail





Recorded Client Recorded Date Recorded By   


 


Document 07/23/18 01:43 [id number]   





PMRU-C03 07/23/18 01:44 [id number]   














  07/23/18





  01:43


 


PMRU Outcome: Discharge Planning 


 


Update Patient Family No


 


Outcome/Goals Demonstrates





 Understanding





 of Discharge





 Plan


 


Progression Toward Outcome/Goals Progressing








Education-Improve/Maintain              Start:  06/26/18 16:22              


Freq:   QSHIFT                          Status: Active      Target:         


Protocol:                                                                   








Activity Type Activity Date Activity User E-Sign Co-Sign Detail





Recorded Client Recorded Date Recorded By   


 


Document 07/24/18 09:43 [id number]   





PMRU-M07 07/24/18 09:44 [id number]   














  07/24/18





  09:43


 


PMRU Outcome: Education 


 


Outcome/Goals Encourage





 Questions


 


Progression Toward Outcome/Goals Progressing








/GI-Improve/Maintain                  Start:  06/26/18 16:22              


Freq:   QSHIFT                          Status: Active      Target:         


Protocol:                                                                   








Activity Type Activity Date Activity User E-Sign Co-Sign Detail





Recorded Client Recorded Date Recorded By   


 


Document 07/24/18 09:43 [id number]   





[id number]-M07 07/24/18 09:44 [id number]   














  07/24/18





  09:43


 


PMRU Outcome: Genitourinary/ 





Gastrointestinal 


 


Genitourinary- Outcome/Goals Maintain/





 Achieve





 Adequate





 Urinary Output





 Remain Free of





 Hospital-





 Acquired UTI





 Other


 


Gastrointestinal-Outcome/Goals Maintain/





 Achieve Bowel





 Regularity in





 Accordance with





 Pt's Baseline





 Prevent





 Constipation


 


Progression Toward Outcome/Goals -  Progressing


 


Progression Toward Outcome/Goals - GI Progressing


 


Outcome/Goals Met Comment sandoval in place.





 patient





 declined





 miralax this am








Medication Administration               Start:  06/26/18 16:22              


Freq:   QSHIFT                          Status: Active      Target:         


Protocol:                                                                   








Activity Type Activity Date Activity User E-Sign Co-Sign Detail





Recorded Client Recorded Date Recorded By   


 


Document 07/24/18 09:43 [id number]   





PMRU-M07 07/24/18 09:44 [id number]   














  07/24/18





  09:43


 


PMRU Outcome: Medication Administration 


 


Assess Patient Knowledge/Teach Med Yes





Education for all Meds 


 


Outcome/Goals Patient





 Independent





 with Medication





 Administration





 at Home





 Demonstrates





 Understanding


 


Progression Towards Outcome/Goals Progressing


 


Is Patient Going Home on Lovenox? No








Metabolic Status- Improve/Maintain      Start:  06/26/18 16:22              


Freq:   QSHIFT                          Status: Active      Target:         


Protocol:                                                                   








Activity Type Activity Date Activity User E-Sign Co-Sign Detail





Recorded Client Recorded Date Recorded By   


 


Document 07/24/18 09:43 [id number]   





[id number]-M07 07/24/18 09:44 [id number]   














  07/24/18





  09:43


 


PMRU Outcome: Metabolic Status 


 


Have Fingersticks Been Ordered Yes


 


Fingerstick Order Frequency AC & HS


 


Outcome/Goals Maintain/





 Improve





 Metabolic





 Status





 Demonstrate





 Knowledge of





 Prevention/





 Treatment of





 Metabolic





 Imbalances


 


Progression Toward Outcome/Goals Progressing








Mobility- Improve/Maintain              Start:  06/30/18 15:55              


Freq:   DAILY                           Status: Active      Target:         


Protocol:                                                                   








Activity Type Activity Date Activity User E-Sign Co-Sign Detail





Recorded Client Recorded Date Recorded By   


 


Document 07/09/18 14:50 [id number]   





PMRU-C12 07/09/18 14:50 [id number]   














  07/09/18





  14:50


 


PMRU Outcome: Mobility 


 


Physical Therapy Evaluation and Yes





Treatment 


 


Activity OOB with Assistance Yes


 


Device Yes


 


Assistance Yes


 


Patient to be seen 5x/wk for  min/ Therex





day for: Mobility





 Training





 Gait Training





 W/C Mobility





 Balance


 


Outcome/Goals Maintain/





 Achieve





 Baseline





 Mobility Status





 Improve





 Mobility Status





 Demonstrates





 Proper Use of





 Assistive





 Devices





 Free from





 Complications





 of Immobility


 


Progression Toward Outcome/Goals Progressing


 


Outcome/Goals Met Improve





 Mobility Status


 


Outcome/Goals Met Comment improved





 ability to





 perform stand





 pivot transfer





 from recliner





 to edge of bed





 with mod A


 


Bed Mobility Yes:





 independent


 


Transfers Yes:





 independent





 with RW


 


Gait x ft Yes: CGA with





 rolling walker





 150'


 


Up/Down Stairs Yes: Min assist





 up/down 8





 stairs with 2





 rails.








Neurological- Improve/Maintain          Start:  06/26/18 16:22              


Freq:   QSHIFT                          Status: Active      Target:         


Protocol:                                                                   








Activity Type Activity Date Activity User E-Sign Co-Sign Detail





Recorded Client Recorded Date Recorded By   


 


Document 07/24/18 09:43 [id number]   





PMRU-M07 07/24/18 09:44 [id number]   














  07/24/18





  09:43


 


PMRU Outcome: Neurological 


 


Weakness/Aphasia Weakness





 Left Side


 


Outcome/Goals Maintain/





 Achieve





 Baseline





 Neurological





 Status





 Improve





 Neurological





 Status





 Prevent





 Avoidable





 Neurological





 Decline





 Maintain/





 Improve





 Strength/ROM


 


Progression Toward Outcome/Goals Progressing








Nutrition-Improve/Maintain              Start:  06/26/18 18:14              


Freq:   DAILY@1200                      Status: Active      Target:         


Protocol:                                                                   








Activity Type Activity Date Activity User E-Sign Co-Sign Detail





Recorded Client Recorded Date Recorded By   


 


Document 07/23/18 01:43 [id number]   





PMRU-C03 07/23/18 01:44 [id number]   














  07/23/18





  01:43


 


Outcome: Nutrition 


 


Outcome/Goals Maintain/





 Improve





 Nutritional





 Status


 


Progression Toward Outcome/Goals Progressing








Nutrition/Swallowing- Improve/Maintain  Start:  06/29/18 10:07              


Freq:   QSHIFT                          Status: Active      Target:         


Protocol:                                                                   








Activity Type Activity Date Activity User E-Sign Co-Sign Detail





Recorded Client Recorded Date Recorded By   


 


Document 07/24/18 00:38 [id number]   





PMRU-C07 07/24/18 00:38 [id number]   














  07/24/18





  00:38


 


PMRU Outcome: Nutrition/Swallowing 


 


Outcome/Goals Demonstrates





 Adequate





 Hydration/





 Prevents





 Dehydration





 Maintain/





 Improve





 Nutritional





 Status


 


Other Outcome/Goals pt declined





 supper this





 shift; small hs





 snack


 


Progression Toward Outcome/Goals Progressing








Safety- Improve/Maintain                Start:  06/26/18 16:22              


Freq:   QSHIFT                          Status: Active      Target:         


Protocol:                                                                   








Activity Type Activity Date Activity User E-Sign Co-Sign Detail





Recorded Client Recorded Date Recorded By   


 


Document 07/24/18 09:43 [id number]   





[id number]-M07 07/24/18 09:44 [id number]   














  07/24/18





  09:43


 


PMRU Outcome: Safety 


 


Outcome/Goals Remain Free of





 Injury or Harm





 Cooperates with





 Safety





 Measures for





 Least





 Restrictive





 Environment





 Prevent Falls/





 Injury





 Equipment





 Needed


 


Progression Toward Outcome/Goals Progressing








Skin- Improve/Maintain                  Start:  06/26/18 16:22              


Freq:   QSHIFT                          Status: Complete    Target:         


Protocol:                                                                   








Activity Type Activity Date Activity User E-Sign Co-Sign Detail





Recorded Client Recorded Date Recorded By   


 


Document 07/19/18 20:00 [id number]   





PMRU-C03 07/19/18 22:45 [id number]   














  07/19/18





  20:00


 


PMRU Outcome: Skin 


 


Skin Risk Level Medium


 


Skin Orders Air Mattress





 Turn/Position





 q2hr While in





 Bed


 


Outcome/Goals Maintain/





 Improve Skin





 Intergrity





 Free from





 Decubitus


 


Outcome/Goals Met Maintain/





 Improve Skin





 Intergrity














Medicine Note: 








Length of Stay:  1 day





Anticipated Discharge Destination: [place], then SNF





Tentative Discharge Date: 07/25/18





Discharged to:  Home

## 2018-07-24 NOTE — PN
Progress Note


Date of Service: 07/24/18


Note: 


CAROLYN DEGROOT was visited. Therapy notes read and reviewed. He was discussed in 

interdisciplinary team rounds. He is still quite flat. Will go for cystoscopy 

in am, and then to [facility] shortly thereafter for [facility]INE[facility] 








Current Medications: 


 Active Medications











Generic Name Dose Route Start Last Admin





  Trade Name Freq  PRN Reason Stop Dose Admin


 


Acetaminophen  650 mg  07/07/18 19:55  07/09/18 22:36





  Tylenol Tab*  PO   650 mg





  Q6H PRN   Administration





  PAIN   





     





     





     


 


Aspirin  325 mg  06/27/18 09:00  07/24/18 08:25





  Ecotrin Ec Tab*  PO   325 mg





  DAILY CELESTE   Administration





     





     





     





     


 


Atorvastatin Calcium  20 mg  06/26/18 17:00  07/24/18 18:42





  Lipitor*  PO   20 mg





  1700 CELESTE   Administration





     





     





     





     


 


Bisacodyl  10 mg  06/30/18 12:18  07/11/18 19:59





  Dulcolax Supp*  [place]   10 mg





  DAILY PRN   Administration





  CONSTIPATION   





     





     





     


 


Clopidogrel Bisulfate  75 mg  06/27/18 09:00  07/24/18 08:25





  Plavix Tab*  PO   75 mg





  DAILY CELESTE   Administration





     





     





     





     


 


Dextrose  12.5 gm  06/26/18 16:10  





  D50w Syringe 50 Ml*  IV PUSH   





  .FOR FS < 60 - SS PRN   





  FS < 60   





     





     





     


 


Docusate Sodium  100 mg  06/26/18 21:00  07/24/18 08:25





  Colace Cap*  PO   100 mg





  BID CELESTE   Administration





     





     





     





     


 


Fluoxetine HCl  20 mg  07/05/18 09:00  07/24/18 08:25





  Prozac Cap*  PO   20 mg





  DAILY CELESTE   Administration





     





     





     





     


 


Glyburide  5 mg  07/09/18 09:00  07/24/18 08:25





  Diabeta Tab*  PO   5 mg





  DAILY WITH MEAL CELESTE   Administration





     





     





     





     


 


Glyburide  2.5 mg  07/10/18 21:00  07/23/18 20:15





  Diabeta Tab*  PO   2.5 mg





  BEDTIME CELESTE   Administration





     





     





     





     


 


Insulin Human Lispro  0 - 10 units  07/08/18 11:30  07/24/18 17:32





  Humalog*  SUBCUT   1 units





  AC CELESTE   Administration





     





     





  Protocol   





     


 


Lidocaine HCl  1 applic  07/05/18 17:00  07/24/18 18:42





  Lidocaine 2% Jelly*  TOPICAL   Not Given





  QID CELESTE   





     





     





     





     


 


Magnesium Hydroxide  30 ml  06/26/18 15:53  07/11/18 16:50





  Milk Of Magnesia Liq*  PO   30 ml





  Q6H PRN   Administration





  CONSTIPATION   





     





     





     


 


Methylphenidate HCl  2.5 mg  07/11/18 07:00  07/24/18 12:19





  Ritalin Tab*  PO   2.5 mg





  0700,1100 CELESTE   Administration





     





     





     





     


 


Phenyleph/Shark Oil/Min Oil/Petrol  1 applic  07/22/18 10:49  07/22/18 19:49





  Preparation H*  [place]   1 applic





  TID PRN   Administration





  hemorrhoid irritation   





     





     





     


 


Polyethylene Glycol/Electrolytes  17 gm  06/30/18 13:00  07/24/18 08:29





  Miralax*  PO   Not Given





  DAILY CELESTE   





     





     





     





     


 


Senna  2 tab  06/26/18 15:53  07/09/18 22:36





  Senokot Tab*  PO   2 tab





  BEDTIME PRN   Administration





  CONSTIPATION   





     





     





     











Vital Signs: 


 Vital Signs











Temp Pulse Resp BP Pulse Ox


 


 98.4 F   89   20   94/50   100 


 


 07/24/18 15:50  07/24/18 15:50  07/24/18 15:50  07/24/18 15:50  07/24/18 15:50











Lab Results: 


 Laboratory Results - last 24 hr











  07/23/18 07/24/18 07/24/18





  20:14 08:03 11:42


 


POC Glucose (mg/dL)  178 H  68 L  154 H














  07/24/18





  16:35


 


POC Glucose (mg/dL)  132 H











Exam: 





GENERAL: no acute distress. alert and appropriate.


LUNGS: clear bilaterally.


HEART: regular rate and rhythm


ABDOMEN: + bowel sounds, soft, non-tender, non-distended.


EXTREMITIES: no edema.


NEUROLOGIC: left facial droop and tongue deviates left.  Left motor shows good 

arm movement,  some movement in leg. Sensation impaired on left.


: hematuria


Assessment/Plan: 








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 PT/OT/SLP.  


2. Metastatic Prostate Cancer with prostatic bleeding: Sandoval changed by Dr. Bacon on 7/21. Diagnostic cystoscopy tomorrow.  Flush sandoval to keep patent. 


3. Depression:  Prozac 20mg daily. Ritalin 2.5mg 0700,1100. TSH normal


4. DM: Continue glyburide 5mg am and 2.5 mg pm (home dose 5 bid). Continue SSI 

AC . 


5. DVT Prophylaxis: Heparin held due to prostatic bleeding


6. Advanced Directives: Full code


7. Dysphagia: regular textures, nectar thick liquids. Free water between meals


8. Constipation: Miralax. Will follow


9. Hemorrhoids: Preparation H


10. Disposition: Looking at subacute rehab, but d/c will be after cystoscopy














07/24/18 19:41

## 2018-07-25 ENCOUNTER — HOSPITAL ENCOUNTER (OUTPATIENT)
Dept: HOSPITAL 25 - OR | Age: 83
Discharge: ANOTHER HOSPITAL | End: 2018-07-25
Attending: UROLOGY
Payer: MEDICARE

## 2018-07-25 VITALS — DIASTOLIC BLOOD PRESSURE: 65 MMHG | SYSTOLIC BLOOD PRESSURE: 132 MMHG

## 2018-07-25 DIAGNOSIS — E11.9: ICD-10-CM

## 2018-07-25 DIAGNOSIS — I65.21: ICD-10-CM

## 2018-07-25 DIAGNOSIS — I10: ICD-10-CM

## 2018-07-25 DIAGNOSIS — R33.8: ICD-10-CM

## 2018-07-25 DIAGNOSIS — I69.354: ICD-10-CM

## 2018-07-25 DIAGNOSIS — C68.0: Primary | ICD-10-CM

## 2018-07-25 DIAGNOSIS — N18.3: ICD-10-CM

## 2018-07-25 DIAGNOSIS — Z87.891: ICD-10-CM

## 2018-07-25 DIAGNOSIS — C61: ICD-10-CM

## 2018-07-25 DIAGNOSIS — R31.0: ICD-10-CM

## 2018-07-25 LAB
BASOPHILS # BLD AUTO: 0.1 10^3/UL (ref 0–0.2)
EOSINOPHIL # BLD AUTO: 0.3 10^3/UL (ref 0–0.6)
HCT VFR BLD AUTO: 26 % (ref 42–52)
HGB BLD-MCNC: 8.9 G/DL (ref 14–18)
LYMPHOCYTES # BLD AUTO: 1 10^3/UL (ref 1–4.8)
MCH RBC QN AUTO: 28 PG (ref 27–31)
MCHC RBC AUTO-ENTMCNC: 34 G/DL (ref 31–36)
MCV RBC AUTO: 83 FL (ref 80–94)
MONOCYTES # BLD AUTO: 0.9 10^3/UL (ref 0–0.8)
NEUTROPHILS # BLD AUTO: 4 10^3/UL (ref 1.5–7.7)
NRBC # BLD AUTO: 0 10^3/UL
NRBC BLD QL AUTO: 0
PLATELET # BLD AUTO: 255 10^3/UL (ref 150–450)
RBC # BLD AUTO: 3.17 10^6/UL (ref 4–5.4)
WBC # BLD AUTO: 6.3 10^3/UL (ref 3.5–10.8)

## 2018-07-25 PROCEDURE — 88305 TISSUE EXAM BY PATHOLOGIST: CPT

## 2018-07-25 PROCEDURE — 88342 IMHCHEM/IMCYTCHM 1ST ANTB: CPT

## 2018-07-25 PROCEDURE — 88341 IMHCHEM/IMCYTCHM EA ADD ANTB: CPT

## 2018-07-25 RX ADMIN — DOCUSATE SODIUM SCH: 100 CAPSULE, LIQUID FILLED ORAL at 20:31

## 2018-07-25 RX ADMIN — GLYBURIDE SCH MG: 2.5 TABLET ORAL at 20:29

## 2018-07-25 RX ADMIN — LIDOCAINE HYDROCHLORIDE SCH: 20 JELLY TOPICAL at 09:00

## 2018-07-25 RX ADMIN — ACETAMINOPHEN PRN MG: 325 TABLET ORAL at 19:08

## 2018-07-25 RX ADMIN — BICALUTAMIDE SCH: 50 TABLET ORAL at 18:44

## 2018-07-25 RX ADMIN — GLYBURIDE SCH MG: 5 TABLET ORAL at 12:07

## 2018-07-25 RX ADMIN — POLYETHYLENE GLYCOL 3350 SCH: 17 POWDER, FOR SOLUTION ORAL at 12:00

## 2018-07-25 RX ADMIN — CLOPIDOGREL SCH MG: 75 TABLET, FILM COATED ORAL at 12:06

## 2018-07-25 RX ADMIN — FLUOXETINE SCH MG: 20 CAPSULE ORAL at 12:06

## 2018-07-25 RX ADMIN — INSULIN LISPRO SCH UNITS: 100 INJECTION, SOLUTION INTRAVENOUS; SUBCUTANEOUS at 16:54

## 2018-07-25 RX ADMIN — ASPIRIN SCH MG: 325 TABLET, COATED ORAL at 12:06

## 2018-07-25 RX ADMIN — INSULIN LISPRO SCH: 100 INJECTION, SOLUTION INTRAVENOUS; SUBCUTANEOUS at 07:30

## 2018-07-25 RX ADMIN — METHYLPHENIDATE HYDROCHLORIDE SCH: 5 TABLET ORAL at 07:15

## 2018-07-25 RX ADMIN — INSULIN LISPRO SCH: 100 INJECTION, SOLUTION INTRAVENOUS; SUBCUTANEOUS at 12:03

## 2018-07-25 RX ADMIN — DOCUSATE SODIUM SCH MG: 100 CAPSULE, LIQUID FILLED ORAL at 12:06

## 2018-07-25 RX ADMIN — ATORVASTATIN CALCIUM SCH MG: 20 TABLET, FILM COATED ORAL at 17:10

## 2018-07-25 RX ADMIN — METHYLPHENIDATE HYDROCHLORIDE SCH: 5 TABLET ORAL at 11:30

## 2018-07-25 NOTE — PN
Progress Note


Date of Service: 07/25/18


Note: 


CAROLYN DEGROOT was visited. Therapy notes read and reviewed. He had cystoscopy 

this am showing the tumor is invading into the urethra. CT scan abd/pelvis 

seems to show a mass in inguinal area on right. Bone scan, labs ordered for 

tomorrow. Dr. Bacon shared results with family. Palliative Care consult 

ordered








Current Medications: 


 Active Medications











Generic Name Dose Route Start Last Admin





  Trade Name Freq  PRN Reason Stop Dose Admin


 


Acetaminophen  650 mg  07/07/18 19:55  07/25/18 19:08





  Tylenol Tab*  PO   650 mg





  Q6H PRN   Administration





  PAIN   





     





     





     


 


Aspirin  325 mg  06/27/18 09:00  07/25/18 12:06





  Ecotrin Ec Tab*  PO   325 mg





  DAILY CELESTE   Administration





     





     





     





     


 


Atorvastatin Calcium  20 mg  06/26/18 17:00  07/25/18 17:10





  Lipitor*  PO   20 mg





  1700 CELESTE   Administration





     





     





     





     


 


Bicalutamide  50 mg  07/25/18 09:00  07/25/18 18:44





  Casodex (Nf)  PO   Not Given





  DAILY CELESTE   





     





     





     





     


 


Bisacodyl  10 mg  06/30/18 12:18  07/11/18 19:59





  Dulcolax Supp*  [place]   10 mg





  DAILY PRN   Administration





  CONSTIPATION   





     





     





     


 


Clopidogrel Bisulfate  75 mg  06/27/18 09:00  07/25/18 12:06





  Plavix Tab*  PO   75 mg





  DAILY CELESTE   Administration





     





     





     





     


 


Dextrose  12.5 gm  06/26/18 16:10  





  D50w Syringe 50 Ml*  IV PUSH   





  .FOR FS < 60 - SS PRN   





  FS < 60   





     





     





     


 


Docusate Sodium  100 mg  06/26/18 21:00  07/25/18 12:06





  Colace Cap*  PO   100 mg





  BID CELESTE   Administration





     





     





     





     


 


Fluoxetine HCl  20 mg  07/05/18 09:00  07/25/18 12:06





  Prozac Cap*  PO   20 mg





  DAILY CELESTE   Administration





     





     





     





     


 


Glyburide  5 mg  07/09/18 09:00  07/25/18 12:07





  Diabeta Tab*  PO   5 mg





  DAILY WITH MEAL CELESTE   Administration





     





     





     





     


 


Glyburide  2.5 mg  07/10/18 21:00  07/24/18 19:40





  Diabeta Tab*  PO   2.5 mg





  BEDTIME CELESTE   Administration





     





     





     





     


 


Insulin Human Lispro  0 - 10 units  07/08/18 11:30  07/25/18 16:54





  Humalog*  SUBCUT   2 units





  AC CELESTE   Administration





     





     





  Protocol   





     


 


Magnesium Hydroxide  30 ml  06/26/18 15:53  07/11/18 16:50





  Milk Of Magnesia Liq*  PO   30 ml





  Q6H PRN   Administration





  CONSTIPATION   





     





     





     


 


Phenyleph/Shark Oil/Min Oil/Petrol  1 applic  07/22/18 10:49  07/22/18 19:49





  Preparation H*  [place]   1 applic





  TID PRN   Administration





  hemorrhoid irritation   





     





     





     


 


Polyethylene Glycol/Electrolytes  17 gm  06/30/18 13:00  07/25/18 12:00





  Miralax*  PO   Not Given





  DAILY CELESTE   





     





     





     





     


 


Senna  2 tab  06/26/18 15:53  07/24/18 19:39





  Senokot Tab*  PO   2 tab





  BEDTIME PRN   Administration





  CONSTIPATION   





     





     





     











Vital Signs: 


 Vital Signs











Temp Pulse Resp BP Pulse Ox


 


 98.3 F   88   20   143/78   99 


 


 07/25/18 16:02  07/25/18 16:02  07/25/18 16:02  07/25/18 16:02  07/25/18 16:40











Lab Results: 


 Laboratory Results - last 24 hr











  07/24/18 07/25/18 07/25/18





  20:07 05:32 05:32


 


WBC   6.3 


 


RBC   3.17 L 


 


Hgb   8.9 L 


 


Hct   26 L 


 


MCV   83 


 


MCH   28 


 


MCHC   34 


 


RDW   15 


 


Plt Count   255 


 


MPV   7.2 L 


 


Neut % (Auto)   63.8 


 


Lymph % (Auto)   16.5 L 


 


Mono % (Auto)   13.9 H 


 


Eos % (Auto)   5.0 


 


Baso % (Auto)   0.8 


 


Absolute Neuts (auto)   4.0 


 


Absolute Lymphs (auto)   1.0 


 


Absolute Monos (auto)   0.9 H 


 


Absolute Eos (auto)   0.3 


 


Absolute Basos (auto)   0.1 


 


Absolute Nucleated RBC   0 


 


Nucleated RBC %   0 


 


Sodium    135


 


Potassium    3.9


 


Chloride    102


 


Carbon Dioxide    26


 


Anion Gap    7


 


BUN    22


 


Creatinine    1.18 H


 


Est GFR ( Amer)    71.0


 


Est GFR (Non-Af Amer)    58.7


 


BUN/Creatinine Ratio    18.6


 


Glucose    89


 


POC Glucose (mg/dL)  238 H  


 


Calcium    8.5 L


 


Total Bilirubin    0.30


 


AST    11 L


 


ALT    5 L


 


Alkaline Phosphatase    72


 


Total Protein    6.2 L


 


Albumin    2.9 L


 


Globulin    3.3


 


Albumin/Globulin Ratio    0.9 L














  07/25/18 07/25/18





  07:00 16:33


 


WBC  


 


RBC  


 


Hgb  


 


Hct  


 


MCV  


 


MCH  


 


MCHC  


 


RDW  


 


Plt Count  


 


MPV  


 


Neut % (Auto)  


 


Lymph % (Auto)  


 


Mono % (Auto)  


 


Eos % (Auto)  


 


Baso % (Auto)  


 


Absolute Neuts (auto)  


 


Absolute Lymphs (auto)  


 


Absolute Monos (auto)  


 


Absolute Eos (auto)  


 


Absolute Basos (auto)  


 


Absolute Nucleated RBC  


 


Nucleated RBC %  


 


Sodium  


 


Potassium  


 


Chloride  


 


Carbon Dioxide  


 


Anion Gap  


 


BUN  


 


Creatinine  


 


Est GFR ( Amer)  


 


Est GFR (Non-Af Amer)  


 


BUN/Creatinine Ratio  


 


Glucose  


 


POC Glucose (mg/dL)  91  163 H


 


Calcium  


 


Total Bilirubin  


 


AST  


 


ALT  


 


Alkaline Phosphatase  


 


Total Protein  


 


Albumin  


 


Globulin  


 


Albumin/Globulin Ratio  











Exam: 





GENERAL: no acute distress. alert and appropriate.


LUNGS: clear bilaterally.


HEART: regular rate and rhythm


ABDOMEN: + bowel sounds, soft, non-tender, non-distended.


EXTREMITIES: no edema.


NEUROLOGIC: left facial droop and tongue deviates left.  Left motor shows good 

arm movement,  some movement in leg. Sensation impaired on left.


: hematuria


Assessment/Plan: 








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 PT/OT/SLP.  


2. Metastatic Prostate Cancer with prostatic bleeding: Diagnostic cystoscopy as 

noted.  Flush sandoval to keep patent. 


3. Depression:  Prozac 20mg daily. D/c ritalin as there was no effect noted.  

TSH normal


4. DM: Continue glyburide 5mg am and 2.5 mg pm (home dose 5 bid). Continue SSI 

AC . 


5. DVT Prophylaxis: Heparin held due to prostatic bleeding


6. Advanced Directives: Full code


7. Dysphagia: regular textures, nectar thick liquids. Free water between meals


8. Constipation: Miralax. Will follow


9. Hemorrhoids: Preparation H


10. Disposition: Looking at subacute rehab, d/c will likely be tomorrow











07/25/18 20:00

## 2018-07-25 NOTE — RAD
INDICATION: Metastatic prostate carcinoma



COMPARISON: CT abdomen pelvis January 15, 2018; bone scan January 15, 2018

 

TECHNIQUE: Noncontrast axial source images were obtained from the hemidiaphragms to the

symphysis pubis. This examination was ordered using a renal stone protocol which is

performed without oral or intravenous contrast and therefore has inherent limitations when

used to evaluate other intra-abdominal or intrapelvic pathology. Consider conventional

contrast enhanced imaging if clinically indicated.



Lung bases: There are lateral parenchymal nodules in the lung bases. These show

progressive interval decrease in size. There are findings suggestive of bilateral

gynecomastia.



Liver: The liver is normal in size. Noncontrast imaging shows no evidence of a hepatic

mass or ductal dilatation.



Gallbladder: There are no calcified gallstones. There is no evidence of wall thickening or

pericholecystic fluid..



Spleen: The spleen is normal in size. The noncontrast CT appearance is normal.



Pancreas: Noncontrast imaging shows no pancreatic mass or ductal dilitation. There are

consultations of the pancreatic head consistent with chronic pancreatitis



Adrenal glands: No masses are identified.



Kidneys/Bladder: There is no evidence of nephrolithiasis or CT evidence of hydronephrosis.

Noncontrast imaging shows no evidence of a renal mass. Evaluation of bladder is limited

because the bladder is decompressed by Marshall catheter.



Adenopathy: There is no evidence of intraperitoneal or retroperitoneal adenopathy.

Evaluation is limited without oral contrast.



Fluid collections: There are no free or localized fluid collections.



Vessels: The aorta and iliac vessels are normal in caliber. There are no significant

atherosclerotic changes. The IVC appears normal 



Pelvic organs: The prostate is markedly enlarged with mild interval increase in size.



GI tract: Evaluation of the bowel is limited without oral contrast. The stomach, small

bowel, and lower GI tract appear grossly normal. There are no obstructive findings. The

appendix is visualized and appears normal.



Soft tissues: There is rounded soft tissue density in the right inguinal canal which could

represent metastasis.



Osseous structures: There are no acute osseous findings. A bone scan is a more sensitive

means to evaluate for potential osseous metastasis



IMPRESSION:

1.  Multiple pulmonary parenchymal nodules appear smaller.

2.  Marked prostatic enlargement. The prostate appears slightly larger.

3.  Rounded soft tissue density in the right inguinal canal may represent the right testis

but should be evaluated clinically

## 2018-07-26 ENCOUNTER — HOSPITAL ENCOUNTER (INPATIENT)
Dept: HOSPITAL 25 - MED | Age: 83
LOS: 7 days | Discharge: NURSING, REHAB OR LONG-TERM CARE | DRG: 687 | End: 2018-08-02
Attending: HOSPITALIST | Admitting: HOSPITALIST
Payer: MEDICARE

## 2018-07-26 VITALS — DIASTOLIC BLOOD PRESSURE: 55 MMHG | SYSTOLIC BLOOD PRESSURE: 109 MMHG

## 2018-07-26 DIAGNOSIS — C61: ICD-10-CM

## 2018-07-26 DIAGNOSIS — N18.3: ICD-10-CM

## 2018-07-26 DIAGNOSIS — Z66: ICD-10-CM

## 2018-07-26 DIAGNOSIS — R31.0: ICD-10-CM

## 2018-07-26 DIAGNOSIS — Z88.0: ICD-10-CM

## 2018-07-26 DIAGNOSIS — E11.22: ICD-10-CM

## 2018-07-26 DIAGNOSIS — Z79.82: ICD-10-CM

## 2018-07-26 DIAGNOSIS — K64.9: ICD-10-CM

## 2018-07-26 DIAGNOSIS — I12.9: ICD-10-CM

## 2018-07-26 DIAGNOSIS — C79.19: Primary | ICD-10-CM

## 2018-07-26 DIAGNOSIS — I69.322: ICD-10-CM

## 2018-07-26 DIAGNOSIS — F32.9: ICD-10-CM

## 2018-07-26 DIAGNOSIS — C79.82: ICD-10-CM

## 2018-07-26 DIAGNOSIS — Z96.0: ICD-10-CM

## 2018-07-26 DIAGNOSIS — Z88.1: ICD-10-CM

## 2018-07-26 DIAGNOSIS — D62: ICD-10-CM

## 2018-07-26 DIAGNOSIS — C79.51: ICD-10-CM

## 2018-07-26 DIAGNOSIS — Z87.891: ICD-10-CM

## 2018-07-26 LAB
BASOPHILS # BLD AUTO: 0.1 10^3/UL (ref 0–0.2)
EOSINOPHIL # BLD AUTO: 0.2 10^3/UL (ref 0–0.6)
HCT VFR BLD AUTO: 26 % (ref 42–52)
HGB BLD-MCNC: 8.7 G/DL (ref 14–18)
LYMPHOCYTES # BLD AUTO: 0.8 10^3/UL (ref 1–4.8)
MCH RBC QN AUTO: 28 PG (ref 27–31)
MCHC RBC AUTO-ENTMCNC: 34 G/DL (ref 31–36)
MCV RBC AUTO: 83 FL (ref 80–94)
MONOCYTES # BLD AUTO: 1.1 10^3/UL (ref 0–0.8)
NEUTROPHILS # BLD AUTO: 7.2 10^3/UL (ref 1.5–7.7)
NRBC # BLD AUTO: 0 10^3/UL
NRBC BLD QL AUTO: 0
PLATELET # BLD AUTO: 277 10^3/UL (ref 150–450)
RBC # BLD AUTO: 3.09 10^6/UL (ref 4–5.4)
WBC # BLD AUTO: 9.4 10^3/UL (ref 3.5–10.8)

## 2018-07-26 PROCEDURE — 77412 RADIATION TX DELIVERY LVL 3: CPT

## 2018-07-26 PROCEDURE — 77387 GUIDANCE FOR RADJ TX DLVR: CPT

## 2018-07-26 PROCEDURE — 77334 RADIATION TREATMENT AID(S): CPT

## 2018-07-26 PROCEDURE — DWY67ZZ CONTACT RADIATION OF PELVIC REGION: ICD-10-PCS

## 2018-07-26 PROCEDURE — 80048 BASIC METABOLIC PNL TOTAL CA: CPT

## 2018-07-26 PROCEDURE — 77290 THER RAD SIMULAJ FIELD CPLX: CPT

## 2018-07-26 PROCEDURE — 36415 COLL VENOUS BLD VENIPUNCTURE: CPT

## 2018-07-26 PROCEDURE — 77332 RADIATION TREATMENT AID(S): CPT

## 2018-07-26 PROCEDURE — 99233 SBSQ HOSP IP/OBS HIGH 50: CPT

## 2018-07-26 PROCEDURE — 77307 TELETHX ISODOSE PLAN CPLX: CPT

## 2018-07-26 PROCEDURE — 99223 1ST HOSP IP/OBS HIGH 75: CPT

## 2018-07-26 PROCEDURE — 85027 COMPLETE CBC AUTOMATED: CPT

## 2018-07-26 PROCEDURE — 77280 THER RAD SIMULAJ FIELD SMPL: CPT

## 2018-07-26 PROCEDURE — 77331 SPECIAL RADIATION DOSIMETRY: CPT

## 2018-07-26 RX ADMIN — GLYBURIDE SCH MG: 5 TABLET ORAL at 08:24

## 2018-07-26 RX ADMIN — CLOPIDOGREL SCH MG: 75 TABLET, FILM COATED ORAL at 08:24

## 2018-07-26 RX ADMIN — GLYBURIDE SCH MG: 2.5 TABLET ORAL at 21:34

## 2018-07-26 RX ADMIN — INSULIN LISPRO SCH UNIT: 100 INJECTION, SOLUTION INTRAVENOUS; SUBCUTANEOUS at 17:59

## 2018-07-26 RX ADMIN — ATORVASTATIN CALCIUM SCH MG: 20 TABLET, FILM COATED ORAL at 17:59

## 2018-07-26 RX ADMIN — INSULIN LISPRO SCH: 100 INJECTION, SOLUTION INTRAVENOUS; SUBCUTANEOUS at 08:23

## 2018-07-26 RX ADMIN — INSULIN LISPRO SCH UNIT: 100 INJECTION, SOLUTION INTRAVENOUS; SUBCUTANEOUS at 21:30

## 2018-07-26 RX ADMIN — POLYETHYLENE GLYCOL 3350 SCH: 17 POWDER, FOR SOLUTION ORAL at 08:24

## 2018-07-26 RX ADMIN — DOCUSATE SODIUM SCH: 100 CAPSULE, LIQUID FILLED ORAL at 08:24

## 2018-07-26 RX ADMIN — FLUOXETINE SCH MG: 20 CAPSULE ORAL at 08:24

## 2018-07-26 RX ADMIN — ASPIRIN SCH MG: 325 TABLET, COATED ORAL at 08:24

## 2018-07-26 RX ADMIN — BICALUTAMIDE SCH MG: 50 TABLET ORAL at 08:24

## 2018-07-26 RX ADMIN — DOCUSATE SODIUM SCH MG: 100 CAPSULE, LIQUID FILLED ORAL at 21:34

## 2018-07-26 NOTE — CONSULT
Consultation





- Reason for Consultation


Reason for Consultation: 





metastatic prostate cancer and bleeding


Ordering Provider: Raúl Bacon


Chief Complaint: 





metastatic prostate cancer with penile bleeding


History of Present Illness: 





86 yo M diagnosed with metastatic prostate cancer in December of 2017 when he 

presented with a PSA of 52.  Prostate biopsy confirmed Tiffanie 9 

adenocarcinoma.  Metastatic work up revealed a solitary lesion in the right 

ischium.  He was started on casodex and lupron, with his most recent shot being 

a 30 mg IM dose in May.  His PSA has dropped to 0.2.  He then presented to [facility] 

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  He was started on 

plavix and aspirin.  He was noted to have retention and was treated for a UTI 

and has had an indwelling sandoval catheter.  He was transferred to [facility] where he 

has developed progressive hematuria.  He was taken to the OR for evaluation 

yesterday where there was noted to be metastatic implants throughout his penile 

urethra that were actively bleeding.  Biopsies were taken.  Compression with 

ace bandage has not helped.  He has dropped his Hb 4 points.





Overall he is a vague historian but reports a 35 lb weight loss over an 

unspecified period of time.  He relates this to poor appetite.  Prior to this 

hospitalization he was living independently.   Currently he has impaired left 

sided function.  He is able to use his left hand to some degree and can barely 

lift his left leg off of the bed but can not ambulate.  He was originally 

[facility], however he will need palliative RT and so 

this plan has been cancelled.  





Allergies/Medications


Medication: 











Acetaminophen (Tylenol Tab*)  650 mg PO Q6H PRN


   PRN Reason: PAIN


   Last Admin: 07/25/18 19:08 Dose:  650 mg


Atorvastatin Calcium (Lipitor*)  20 mg PO 1700 CELESTE


   Last Admin: 07/25/18 17:10 Dose:  20 mg


Bicalutamide (Casodex (Nf))  50 mg PO DAILY CELESTE


   Last Admin: 07/26/18 08:24 Dose:  50 mg


Bisacodyl (Dulcolax Supp*)  10 mg KY DAILY PRN


   PRN Reason: CONSTIPATION


   Last Admin: 07/11/18 19:59 Dose:  10 mg


Dextrose (D50w Syringe 50 Ml*)  12.5 gm IV PUSH .FOR FS < 60 - SS PRN


   PRN Reason: FS < 60


Docusate Sodium (Colace Cap*)  100 mg PO BID [facility]


   Last Admin: 07/26/18 08:24 Dose:  Not Given


Fluoxetine HCl (Prozac Cap*)  20 mg PO DAILY [facility]


   Last Admin: 07/26/18 08:24 Dose:  20 mg


Glyburide (Diabeta Tab*)  5 mg PO DAILY WITH MEAL [facility]


   Last Admin: 07/26/18 08:24 Dose:  5 mg


Glyburide (Diabeta Tab*)  2.5 mg PO BEDTIME [facility]


   Last Admin: 07/25/18 20:29 Dose:  2.5 mg


Insulin Human Lispro (Humalog*)  0 - 10 units SUBCUT [facility]; Protocol


   Last Admin: 07/26/18 08:23 Dose:  Not Given


Magnesium Hydroxide (Milk Of Magnesia Liq*)  30 ml PO Q6H PRN


   PRN Reason: CONSTIPATION


   Last Admin: 07/11/18 16:50 Dose:  30 ml


Phenyleph/Shark Oil/Min Oil/Petrol (Preparation H*)  1 applic KY TID PRN


   PRN Reason: hemorrhoid irritation


   Last Admin: 07/22/18 19:49 Dose:  1 applic


Polyethylene Glycol/Electrolytes (Miralax*)  17 gm PO DAILY [facility]


   Last Admin: 07/26/18 08:24 Dose:  Not Given


Senna (Senokot Tab*)  2 tab PO BEDTIME PRN


   PRN Reason: CONSTIPATION


   Last Admin: 07/24/18 19:39 Dose:  2 tab








Allergies/Adverse Reactions: 


 Allergies











Allergy/AdvReac Type Severity Reaction Status Date / Time


 


amoxicillin Allergy Intermediate Rash And Verified 07/24/18 15:19





   Itching  


 


clavulanic acid Allergy Intermediate Rash And Verified 07/24/18 15:19





[From Augmentin]   Itching  














History





- Past Medical History


Hx Cancer: Yes - prostate CA


Hx Diabetes: Yes


Hx Hypertension: Yes


Other History: CVA.  CKD.  TURP





- Family History


Other Family History: unaware of any





- Social History


Hx Alcohol Use: No


Hx Tobacco Use: Yes - quit 40 years ago





Review of Systems





- Review of Systems


Constitutional Symptoms: Positive: Weight Loss


Dermatology: Positive: Normal


HEENT: Positive: Normal


Eyes: Positive: Normal


Thyroid: Positive: Normal


Pulmonary: Positive: Normal


Cardiology: Positive: Normal


Gastroenterology: Positive: Anorexia


Genital - Urinary: Positive: Hematuria


Musculoskeletal: Positive: Arthritis


Endocrinology: Positive: Normal


Hematologic/Lymphatic: Positive: Anemia


Neurology: Positive: Hx of Stroke\TIA - left sided weakness


Psychiatry: Positive: Normal





Physical Exam





- Physical Exam


Physical Examination: 





 Vital Signs











Temp Pulse Resp BP Pulse Ox


 


 98.3 F   72   18   109/55   98 


 


 07/25/18 16:02  07/26/18 06:32  07/25/18 20:10  07/26/18 06:32  07/26/18 08:23








lying flat in bed in nad


perr eomi


op moist


CTA anteriorly


s1 s2 distant


soft nt +Bs


no le edema


3/5 strength LUE


2+/5 LLE


A+O x 3











Results





- Lab Results


Lab Results: 


 











  07/23/18 07/23/18 07/23/18





  11:20 16:24 20:14


 


WBC   


 


RBC   


 


Hgb   


 


Hct   


 


MCV   


 


MCH   


 


MCHC   


 


RDW   


 


Plt Count   


 


MPV   


 


Neut % (Auto)   


 


Lymph % (Auto)   


 


Mono % (Auto)   


 


Eos % (Auto)   


 


Baso % (Auto)   


 


Absolute Neuts (auto)   


 


Absolute Lymphs (auto)   


 


Absolute Monos (auto)   


 


Absolute Eos (auto)   


 


Absolute Basos (auto)   


 


Absolute Nucleated RBC   


 


Nucleated RBC %   


 


Sodium   


 


Potassium   


 


Chloride   


 


Carbon Dioxide   


 


Anion Gap   


 


BUN   


 


Creatinine   


 


Est GFR ( Amer)   


 


Est GFR (Non-Af Amer)   


 


BUN/Creatinine Ratio   


 


Glucose   


 


POC Glucose (mg/dL)  212 H  99  178 H


 


Calcium   


 


Total Bilirubin   


 


AST   


 


ALT   


 


Alkaline Phosphatase   


 


Total Protein   


 


Albumin   


 


Globulin   


 


Albumin/Globulin Ratio   


 


Prostate Specific Ag   


 


Total Testosterone   














  07/24/18 07/24/18 07/24/18





  08:03 11:42 16:35


 


WBC   


 


RBC   


 


Hgb   


 


Hct   


 


MCV   


 


MCH   


 


MCHC   


 


RDW   


 


Plt Count   


 


MPV   


 


Neut % (Auto)   


 


Lymph % (Auto)   


 


Mono % (Auto)   


 


Eos % (Auto)   


 


Baso % (Auto)   


 


Absolute Neuts (auto)   


 


Absolute Lymphs (auto)   


 


Absolute Monos (auto)   


 


Absolute Eos (auto)   


 


Absolute Basos (auto)   


 


Absolute Nucleated RBC   


 


Nucleated RBC %   


 


Sodium   


 


Potassium   


 


Chloride   


 


Carbon Dioxide   


 


Anion Gap   


 


BUN   


 


Creatinine   


 


Est GFR ( Amer)   


 


Est GFR (Non-Af Amer)   


 


BUN/Creatinine Ratio   


 


Glucose   


 


POC Glucose (mg/dL)  68 L  154 H  132 H


 


Calcium   


 


Total Bilirubin   


 


AST   


 


ALT   


 


Alkaline Phosphatase   


 


Total Protein   


 


Albumin   


 


Globulin   


 


Albumin/Globulin Ratio   


 


Prostate Specific Ag   


 


Total Testosterone   














  07/24/18 07/25/18 07/25/18





  20:07 05:32 05:32


 


WBC   6.3 


 


RBC   3.17 L 


 


Hgb   8.9 L 


 


Hct   26 L 


 


MCV   83 


 


MCH   28 


 


MCHC   34 


 


RDW   15 


 


Plt Count   255 


 


MPV   7.2 L 


 


Neut % (Auto)   63.8 


 


Lymph % (Auto)   16.5 L 


 


Mono % (Auto)   13.9 H 


 


Eos % (Auto)   5.0 


 


Baso % (Auto)   0.8 


 


Absolute Neuts (auto)   4.0 


 


Absolute Lymphs (auto)   1.0 


 


Absolute Monos (auto)   0.9 H 


 


Absolute Eos (auto)   0.3 


 


Absolute Basos (auto)   0.1 


 


Absolute Nucleated RBC   0 


 


Nucleated RBC %   0 


 


Sodium    135


 


Potassium    3.9


 


Chloride    102


 


Carbon Dioxide    26


 


Anion Gap    7


 


BUN    22


 


Creatinine    1.18 H


 


Est GFR ( Amer)    71.0


 


Est GFR (Non-Af Amer)    58.7


 


BUN/Creatinine Ratio    18.6


 


Glucose    89


 


POC Glucose (mg/dL)  238 H  


 


Calcium    8.5 L


 


Total Bilirubin    0.30


 


AST    11 L


 


ALT    5 L


 


Alkaline Phosphatase    72


 


Total Protein    6.2 L


 


Albumin    2.9 L


 


Globulin    3.3


 


Albumin/Globulin Ratio    0.9 L


 


Prostate Specific Ag   


 


Total Testosterone   














  07/25/18 07/25/18 07/25/18





  07:00 16:33 20:59


 


WBC   


 


RBC   


 


Hgb   


 


Hct   


 


MCV   


 


MCH   


 


MCHC   


 


RDW   


 


Plt Count   


 


MPV   


 


Neut % (Auto)   


 


Lymph % (Auto)   


 


Mono % (Auto)   


 


Eos % (Auto)   


 


Baso % (Auto)   


 


Absolute Neuts (auto)   


 


Absolute Lymphs (auto)   


 


Absolute Monos (auto)   


 


Absolute Eos (auto)   


 


Absolute Basos (auto)   


 


Absolute Nucleated RBC   


 


Nucleated RBC %   


 


Sodium   


 


Potassium   


 


Chloride   


 


Carbon Dioxide   


 


Anion Gap   


 


BUN   


 


Creatinine   


 


Est GFR ( Amer)   


 


Est GFR (Non-Af Amer)   


 


BUN/Creatinine Ratio   


 


Glucose   


 


POC Glucose (mg/dL)  91  163 H  175 H


 


Calcium   


 


Total Bilirubin   


 


AST   


 


ALT   


 


Alkaline Phosphatase   


 


Total Protein   


 


Albumin   


 


Globulin   


 


Albumin/Globulin Ratio   


 


Prostate Specific Ag   


 


Total Testosterone   














  07/26/18 07/26/18 07/26/18





  05:06 05:06 07:19


 


WBC  9.4  


 


RBC  3.09 L  


 


Hgb  8.7 L  


 


Hct  26 L  


 


MCV  83  


 


MCH  28  


 


MCHC  34  


 


RDW  15  


 


Plt Count  277  


 


MPV  6.8 L  


 


Neut % (Auto)  76.5  


 


Lymph % (Auto)  9.0 L  


 


Mono % (Auto)  11.5 H  


 


Eos % (Auto)  2.3  


 


Baso % (Auto)  0.7  


 


Absolute Neuts (auto)  7.2  


 


Absolute Lymphs (auto)  0.8 L  


 


Absolute Monos (auto)  1.1 H  


 


Absolute Eos (auto)  0.2  


 


Absolute Basos (auto)  0.1  


 


Absolute Nucleated RBC  0  


 


Nucleated RBC %  0  


 


Sodium   134 L 


 


Potassium   4.0 


 


Chloride   101 


 


Carbon Dioxide   27 


 


Anion Gap   6 


 


BUN   18 


 


Creatinine   1.12 


 


Est GFR ( Amer)   75.4 


 


Est GFR (Non-Af Amer)   62.3 


 


BUN/Creatinine Ratio   16.1 


 


Glucose   85 


 


POC Glucose (mg/dL)    82


 


Calcium   8.5 L 


 


Total Bilirubin   


 


AST   


 


ALT   


 


Alkaline Phosphatase   


 


Total Protein   


 


Albumin   


 


Globulin   


 


Albumin/Globulin Ratio   


 


Prostate Specific Ag   0.132 


 


Total Testosterone   < 10.00 L 














Assessment and Plan


Impression: 





86 yo M w metastatic tiffanie 9 prostate CA with excellent response 

serologically to lupron, now sp pontine stroke complicated by the development 

of gross hematuria on antiplatelet agents, and found to have metastatic 

implants within his penis and urethra (path pending).  





This is a very complicated case.  





He is actively bleeding and I do think we need to hold his anticoagulation 

until we can get that under control .  I have discussed with Dr. Stovall from 

neurology who estimates the risk of recurrent stroke to be ~5% a this point and 

agrees that it is prudent to hold anticoagulation.  





In terms of his prostate cancer, if the biopsy does confirm metastatic prostate 

cancer he would be a candidate for palliative abiraterone.  I completely agree 

with the recommendation for radiation therapy, which will likely start today.





I would recommend transfusing for Hb <8 unless neurology feels that a higher Hb 

is more appropriate from an oncologic stand point.  





Thank you for this consultation and we will continue to follow with you.

## 2018-07-26 NOTE — HP
CC:  Dr. Ibrahim; Dr. Bacon *

 

HISTORY AND PHYSICAL:

 

DATE OF ADMISSION:  07/26/18

 

PRIMARY CARE PROVIDER:  Dr. Ibrahim.

 

CHIEF COMPLAINT:  Hematuria.

 

HISTORY OF PRESENT ILLNESS:  Mr. Garcia is an 85-year-old male with a history 
of metastatic prostate cancer with chronic indwelling Marshall catheter who 
initially was admitted to [facility] on 06/22/18 with complaints of unsteady gait and 
falls for the past several weeks and was identified to have a pontine CVA.  The 
patient was started on Plavix in addition to his aspirin and ultimately on 06/26
/18 was discharged to [facility].  The patient had been on [facility] working with Physical 
Therapy making some improvements, but essentially has plateaued when he began 
to have significant hematuria.  The patient was going to be discharged to 
[facility] for continued physical therapy, however due to the hematuria the 
patient was seen by Dr. Bacon, who has followed the patient long-term for 
his history of metastatic prostate cancer.  The patient was taken to the 
operating room on 07/25/18 where he was identified to have probable prostate 
metastases to the penis and urethra.  The patient was also identified to have 
clot urinary retention due to gross hematuria due to multiple urethral lesions.
  He underwent CT abdomen and pelvis on 07/25/18, which revealed multiple 
pulmonary parenchymal nodules that appeared smaller, marked prostatic 
enlargement and this appears slightly larger than previous, and a rounded soft 
tissue density in the right inguinal canal, which may represent the right 
testis and should be evaluated clinically.  The patient was also noted to have 
dropped his hemoglobin from 11.6 on 06/27/18 to 8.7 on 07/26/18.  Due to the 
concerns for metastatic prostate cancer to the penile urethra and ongoing 
hematuria in the setting of being on dual antiplatelet therapy, it was 
recommended that the patient be admitted back to the acute medical floor for 
further management.  The hospitalist service was then subsequently asked to 
admit the patient for acute blood loss anemia.

 

PAST MEDICAL HISTORY:

1.  Metastatic prostate cancer with chronic indwelling Marshall.

2.  Type 2 diabetes.

3.  Hypertension.

4.  Stage 3 chronic kidney disease.

5.  Pontine CVA.

 

MEDICATIONS:  At the time of transfer from [facility]:

1.  Tylenol 650 mg p.o. q.6 hours p.r.n. pain.

2.  Lipitor 20 mg p.o. daily.

3.  Casodex 50 mg p.o. daily.

4.  Dulcolax suppository 10 mg p.r. daily p.r.n. constipation.

5.  Dextrose syringe 12.5 g IV push for blood sugar less than 60.

6.  Colace 100 mg p.o. b.i.d.

7.  Fluoxetine 20 mg p.o. daily.

8.  Glyburide 5 mg p.o. q.a.m. and 2.5 mg p.o. q.h.s.

9.  Hemorrhoidal ointment 1 application p.r. t.i.d. p.r.n. hemorrhoids.

10.  Lispro sliding scale.

11.  MOM 30 mL p.o. q.6 hours p.r.n. constipation.

12.  MiraLAX 17 g p.o. daily.

13.  Senna 2 tabs p.o. q.h.s. p.r.n. constipation.

 

ALLERGIES:  No known drug allergies.

 

FAMILY HISTORY:  He is not aware of medical history of his parents.

 

SOCIAL HISTORY:  The patient had been living alone.  Reportedly, prior to his 
initial hospitalization, the patient had not really been getting of out of bed 
for approximately a month.  He is a retired .  He quit smoking over 40 
years ago.  He denies any alcohol use.  The patient's son, Lawson Tatum, is his 
health care proxy.

 

REVIEW OF SYSTEMS:  A complete 11-system review of systems was obtained.  
Pertinent positives and negatives are as per HPI and otherwise negative.

 

                               PHYSICAL EXAMINATION

 

GENERAL:  The patient is a well-developed, elderly male seen sitting up in a 
chair, in no acute distress.

 

VITAL SIGNS:  Blood pressure 110/48, pulse 79, respirations 16, temp 97.2, O2 
sat 99% on room air.

 

HEENT:  Pupils are equal and round.  Extraocular muscles are intact.  
Oropharynx is clear.  Oral mucosa is moist.

 

NECK:  There is no submandibular, cervical, or supraclavicular adenopathy.  
Thyroid is not enlarged.  No thyroid nodules noted.

 

PULMONARY:  Lungs are clear to auscultation bilaterally.

 

CARDIAC:  Normal S1, S2.  Regular rate and rhythm.  I do not appreciate any 
murmurs.  There is no lower extremity edema.

 

ABDOMEN:  Bowel sounds are present.  Abdomen is soft, nontender, and 
nondistended.

 

MUSCULOSKELETAL:  There is no cyanosis or clubbing of the digits.  There is 
limited range of motion to the left upper extremity and left lower extremity, 
though the left lower extremity is much more limited than the upper extremity.  
Right-sided range of motion is within normal limits.

 

NEURO:  Cranial nerves II through XII are grossly intact.  Sensation is intact 
to light touch.  Speech is delayed and very minimally slurred, but otherwise 
clear. Strength of the left upper extremity is reduced at 4+/5, left lower 
extremity strength is reduced at 4-/5, right-sided strength is 5/5 in the upper 
and lower extremities.

 

PSYCHIATRIC:  The patient is alert.  He is oriented x3.  Affect appears 
appropriate.

 

SKIN:  Warm and dry.  There are no rashes.

 

 DIAGNOSTIC STUDIES/LAB DATA:  WBC 9.4, hemoglobin 8.7, hematocrit 26, 
platelets 277.  Sodium 134, potassium 4.0, chloride 101, CO2 of 27, BUN 18, 
creatinine 1.12, calcium 8.5.  Total testosterone less than 10, PSA 0.132.

 

ASSESSMENT AND PLAN:  Mr. Garcia is an 85-year-old male, who has a known 
history of metastatic prostate cancer (single met to the ischium), type 2 
diabetes and hypertension, who was initially admitted to [facility] on 06/22/18 after 
a pontine cerebrovascular accident was identified.  He was then started on 
Plavix and now subsequently has significant hematuria and bleeding into the 
penis secondary to penile urethral lesions that likely represent metastatic 
prostate cancer.

 

1.  Acute blood loss anemia.  The patient has essentially dropped his 
hemoglobin by 3 g from 06/27/18 through 07/26/18.  At this point, he does not 
need a blood transfusion; however, Dr. Wolff was consulted and recommended 
transfusing for a hemoglobin of less than 8.  Additionally, the patient has now 
been taken off his aspirin and Plavix with the understanding that this does put 
him at increased risk of cerebrovascular accident.  The patient had biopsy of 
the urethral lesions. Areas of bleeding within the penile urethra were 
fulgurated.  The bleeding, however, was felt to not be well controlled due to 
the numerous eroding lesions within the urethra.  The patient will have a 
followup hemoglobin this afternoon and again tomorrow morning.

2.  Subacute pontine cerebrovascular accident.  The patient had been rehabbing 
for this and unfortunately hit a plateau with his abilities to improve further.
  Plan will be for the patient to be discharged to [facility] following 
treatment of his acute blood loss anemia.

3.  Metastatic prostate cancer.  As above, it is felt that the urethral lesions 
likely are metastatic prostate cancer.  Biopsy is pending.  A bone scan has 
been ordered and is pending.  The patient will also be seen by Dr. Manrique and 
likely receive palliative radiation therapy initiating today.

4.  Hypertension.  The patient's blood pressure is under very good control.  
His blood pressure will continue to be monitored.

5.  Type 2 diabetes.  The patient will be maintained on his usual doses of 
glyburide.  In addition, he will be on a lispro sliding scale.

6.  DVT prophylaxis:  According to the Adult Thrombosis Prophylaxis Risk Factor 
Assessment Guide, the patient has a total risk factor score of 5 making him the 
highest risk.  SCDs alone will be utilized as DVT prophylaxis given the patient'
s significant hematuria and acute blood loss anemia.

7.  Code status is DNR, which is a change for the patient.

 

TIME SPENT:  Sixty five minutes were spent admitting this patient, of which 
greater than half was spent face-to-face with the patient and his son reviewing 
his history, performing a physical exam, and reviewing previous records.

 

 

 

[id number] #: [id number]

ALISA

## 2018-07-26 NOTE — RAD
HISTORY: CT planning for radiation therapy mapping



COMPARISONS: July 25, 2018



TECHNIQUE: Limited axial CT images were obtained of the pelvis for the purposes of

radiation treatment planning.



FINDINGS: A Marshall catheter is noted in the bladder. The prostate gland is enlarged. There

is atherosclerosis of the aorta. Degenerative changes noted. There is a sclerotic lesion

of the posterior ischium on the right



IMPRESSION: 

LIMITED CT FOR THE PURPOSES OF RADIATION TREATMENT PLANNING

## 2018-07-26 NOTE — RAD
HISTORY: Metastatic prostate cancer



COMPARISONS: January 15, 2018, CT dated July 25, 2018



TECHNIQUE: Planar scintigraphic images were obtained of the whole body with spot images of

the pelvis and feet, after the administration of 22 mCi of technetium 99m HDP,

administered at 10:15 AM on July 26, 2018



FINDINGS: A Marshall catheter is noted. There is physiologic uptake in the kidneys and

bladder.

Again noted is asymmetrically increased uptake within the right ischium similar to the

previous examination. There is uptake in the region of the right CMC joint which is likely

degenerative in nature.



IMPRESSION: STABLE INCREASED UPTAKE WITHIN THE RIGHT ISCHIUM SUGGESTIVE OF OSTEOMYELITIS

METASTATIC DISEASE GIVEN THE HISTORY OF PROSTATE CANCER.



CPT II Codes: 3570F

## 2018-07-26 NOTE — OP
DATE OF OPERATION:  07/25/18 - [facility]

 

DATE OF BIRTH:  [birth year]

 

SURGEON:  Raúl Bacon MD

 

ANESTHESIOLOGIST:  Dr. Roberta Danielson.

 

ANESTHESIA:  IV sedation with MAC.

 

PRE-OP DIAGNOSIS:  Gross hematuria.

                                  Prostate carcinoma

 

POST-OP DIAGNOSES:

1.  Gross hematuria due to multiple urethral lesions.

2.  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

3.  Clot urinary retention due to above.

 

OPERATIVE PROCEDURE:

1.  Cystoscopy & Evacuation of clot retention.

2.  Multiple biopsies of urethral lesions.

3.  Fulguration of lesions of urethra and prostatic urethra.

 

INDICATION FOR PROCEDURE:  Mr. Garcia is an 85-year-old white male who 
presented in December 2017 with a PSA of 60 and a hard prostate on rectal exam.
  Prostate biopsies showed bulky [place] 8 and Matt 9 adenocarcinoma.  
Metastatic work-up with a bone scan and a CT of the abdomen and pelvis showed 
one osteoblastic lesion in the pelvis.

 

The patient was placed on hormone ablation therapy in the form of Lupron and 
Casodex; and, on follow up in May 2018, his PSA was down to 0.5 and was doing 
well.  He was admitted about one month ago with a CVA due to a pontine stroke, 
causing left hemiplegia.  He also was noted to be in urinary retention and had 
Marshall catheter placed.  He was placed on anticoagulation, and since then has 
been having persistent gross hematuria.  He did not improve following 
irrigation.  He is now taken to the operating room for evaluation of the cause 
of the hematuria.

 

PATHOLOGY:  On exam under anesthesia, there were multiple nodules felt in the 
corpus cavernosum of the penis bilaterally.  Rectal exam showed an indurated, 
fixed, large prostate.

 

At cystoscopy, there were multiple friable lesions eroding into the penile and 
bulbar urethrae that were the source of the hematuria.  The prostatic urethra 
was irregular and obstructing, invading into the bladder neck.  There were 
multiple clots inside the bladder.  Following evacuation of the clots, heavy 
bladder trabeculations were noted.  The trigone could not be well visualized.  
The hematuria was partially from the area of the prostatic urethra and mostly 
from the eroding urethral lesions.

 

DESCRIPTION OF PROCEDURE:  With the patient in the dorsal lithotomy position 
and under intravenous sedation and anesthesia monitoring, a cystoscopy was 
performed. The findings in the urethra were noted.  The cystoscope was 
carefully introduced all the way inside the bladder and the full pathology was 
identified in the urethra and the prostatic urethra.

 

The bladder was then irrigated and clot retention evacuated.  The bladder was 
inspected.

The cystoscope was then pulled out into the urethra. Using the biopsy forceps, 
multiple biopsies were obtained from the urethral lesions and sent for 
pathology.

 Using the Bugbee electrode, the areas of bleeding were fulgurated.  The 
bleeding, however, could not be well controlled because of the numerous eroding 
lesions in the urethra.

 

A size 22-Slovak Marshall catheter was then passed inside the bladder and the 
balloon inflated with 30 cc of water.  The catheter was placed under gentle 
traction and taped to the right thigh of the patient to avoid clot retention.  
As expected, there was some blood around the Marshall from the meatus from the 
urethral lesions.

 

Rectal examination was then performed demonstrating the above pathology.

 

The patient tolerated the procedure well and left the operating room in good 
condition.

 

The patient most likely has a recurrence of his poorly differentiated 
adenocarcinoma of the prostate with metastasis into the corpus cavernosum of 
the penis and erosion into the urethra causing the urethral bleeding.  That is 
a difficult problem to manage.

 

The plan is to repeat his metastatic workup with a bone scan, a CT of the 
abdomen and pelvis and repeating the PSA.  I will restart him on Casodex.  
Consultations will be obtained for management options.

 

 [id number]/[facility] #: [id number]

ALISA

## 2018-07-26 NOTE — DS
DATE OF ADMISSION:  06/26/2018.

 

DATE OF DISCHARGE:  07/26/2018.

 

DISCHARGE DIAGNOSES:

1.  Right pontine stroke with left hemiplegia.

2.  Metastatic prostate cancer.

3.  Chronic hematuria from metastatic prostate cancer.

4.  Acute blood loss anemia.

5.  Depression.

6.  Diabetes mellitus.

7.  Dysphagia.

 

HISTORY OF PRESENT ILLNESS AND HOSPITAL COURSE:  For a complete history of the 
events leading up to his rehab stay, please see the history and physical 
dictated by me on 06/26/2018.  While on the rehab unit, the patient continued 
to have penile bleeding and hematuria.  His urologist, Dr. Bacon, was 
called.  Initially, it was thought that if the Marshall catheter was advanced, the 
hematuria would stop. When it did not stop and his hemoglobin continued to drop
, Dr. Bacon was consulted.  Dr. Bacon felt it best to take the patient to 
the operating room for a cystoscopy.  This was done on July 25th. Findings on 
surgery included the tumor invading the penile urethra. It was felt because of 
his ongoing bleeding to stop both his aspirin and his Plavix.  This would 
increase his likelihood of having another cerebrovascular event, but it was 
felt to be the only way to stop the bleeding.  The patient, in addition, is 
going to have palliative radiation to the area.  He had a radiation oncology 
consult as well as an oncology consult done today.  It was felt he might be a 
candidate for palliative chemotherapy depending on the work-up.

 

The patient was seen by both Physical Therapy, Occupational Therapy, and Speech 
Therapy.  He did make some gains.  His diet was able to be upgrade to 
mechanical soft; however, he still required nectar-thickened liquids by the 
time of discharge. With physical therapy at the time of admission, the patient 
was requiring max assist for a transfer, unable to ambulate; by the time of 
discharge he was min assist for a transfer and able to ambulate ten feet with a 
minimal amount of assistance.  With occupational therapy, he was total assist 
for toileting and max assist for toilet transfers at the time of admission; by 
the time of discharge, the patient remained dependent for toileting and min 
assist for toilet transfers.

 

Because of the patient's ongoing medical needs, including monitoring his 
hematuria, as well as his need for radiation, he was transferred to the acute 
Medical Service on 07/26/2018.

 

DISCHARGE DIET:  Consistent carbohydrate, mechanical soft with nectar-thickened 
liquids.

 

DISCHARGE MEDICATIONS:

1.  Casodex 50 mg daily.

2.  Lipitor 20 mg daily.

3.  Prozac 20 mg daily.

4.  DiaBeta 5 mg in the morning, 2.5 mg in the evening.

5.  Sliding scale insulin.

6.  Preparation H.

7.  MiraLax.

8.  Senokot as needed.

 

His aspirin and Plavix were discontinued.

 

SERVICES AFTER DISCHARGE:  He can have restorative physical therapy when 
medically able.

 

 [id number]/[facility] #: [id number]

ALISA

## 2018-07-27 LAB
HCT VFR BLD AUTO: 25 % (ref 42–52)
HGB BLD-MCNC: 8.5 G/DL (ref 14–18)
MCH RBC QN AUTO: 28 PG (ref 27–31)
MCHC RBC AUTO-ENTMCNC: 34 G/DL (ref 31–36)
MCV RBC AUTO: 83 FL (ref 80–94)
PLATELET # BLD AUTO: 281 10^3/UL (ref 150–450)
RBC # BLD AUTO: 3.01 10^6/UL (ref 4–5.4)
WBC # BLD AUTO: 7.3 10^3/UL (ref 3.5–10.8)

## 2018-07-27 RX ADMIN — INSULIN LISPRO SCH: 100 INJECTION, SOLUTION INTRAVENOUS; SUBCUTANEOUS at 22:10

## 2018-07-27 RX ADMIN — ATORVASTATIN CALCIUM SCH MG: 20 TABLET, FILM COATED ORAL at 16:32

## 2018-07-27 RX ADMIN — INSULIN LISPRO SCH: 100 INJECTION, SOLUTION INTRAVENOUS; SUBCUTANEOUS at 12:26

## 2018-07-27 RX ADMIN — GLYBURIDE SCH MG: 2.5 TABLET ORAL at 22:16

## 2018-07-27 RX ADMIN — DOCUSATE SODIUM SCH MG: 100 CAPSULE, LIQUID FILLED ORAL at 08:50

## 2018-07-27 RX ADMIN — INSULIN LISPRO SCH: 100 INJECTION, SOLUTION INTRAVENOUS; SUBCUTANEOUS at 16:26

## 2018-07-27 RX ADMIN — FLUOXETINE SCH MG: 20 CAPSULE ORAL at 08:50

## 2018-07-27 RX ADMIN — DOCUSATE SODIUM SCH MG: 100 CAPSULE, LIQUID FILLED ORAL at 22:16

## 2018-07-27 RX ADMIN — INSULIN LISPRO SCH: 100 INJECTION, SOLUTION INTRAVENOUS; SUBCUTANEOUS at 07:44

## 2018-07-27 RX ADMIN — POLYETHYLENE GLYCOL 3350 SCH GM: 17 POWDER, FOR SOLUTION ORAL at 08:50

## 2018-07-27 NOTE — PN
Subjective


Date of Service: 07/27/18


Interval History: 





Pt is feeling ok this AM. He was told his blood sugar was low this AM but he 

denied any symptoms. He was given a snack of orange juice and varghese crackers 

and states he choked a little on the orange juice. He states that was happening 

on rehab as well. He denies any pain in his penis. He states XRT yesterday went 

ok. 





Objective


Active Medications: 








Acetaminophen (Tylenol Tab*)  650 mg PO Q6H PRN


   PRN Reason: PAIN


Atorvastatin Calcium (Lipitor*)  20 mg PO 1700 [facility]


   Last Admin: 07/26/18 17:59 Dose:  20 mg


Bicalutamide (Casodex (Nf))  50 mg PO DAILY [facility]; Protocol


   Last Admin: 07/27/18 07:44 Dose:  Not Given


Bisacodyl (Dulcolax Supp*)  10 mg OH DAILY PRN


   PRN Reason: CONSTIPATION


Dextrose (D50w Syringe 50 Ml*)  12.5 gm IV PUSH .FOR FS < 60 - SS PRN


   PRN Reason: FS < 60


Docusate Sodium (Colace Cap*)  100 mg PO BID [facility]


   Last Admin: 07/26/18 21:34 Dose:  100 mg


Fluoxetine HCl (Prozac Cap*)  20 mg PO QAM [facility]


Glyburide (Diabeta Tab*)  5 mg PO 0800 [facility]


Glyburide (Diabeta Tab*)  2.5 mg PO BEDTIME [facility]


   Last Admin: 07/26/18 21:34 Dose:  2.5 mg


Insulin Human Lispro (Humalog*)  0 units SUBCUT ACHS [facility]; Protocol


   Last Admin: 07/27/18 07:44 Dose:  Not Given


Magnesium Hydroxide (Milk Of Magnesia Liq*)  30 ml PO Q6H PRN


   PRN Reason: CONSTIPATION


Phenyleph/Shark Oil/Min Oil/Petrol (Preparation H*)  1 applic OH TID PRN


   PRN Reason: hemorrhoids


Polyethylene Glycol/Electrolytes (Miralax*)  17 gm PO DAILY [facility]


Senna (Senokot Tab*)  2 tab PO BEDTIME PRN


   PRN Reason: CONSTIPATION








 Vital Signs - 8 hr











  07/27/18 07/27/18





  04:15 07:28


 


Temperature 98.0 F 97.9 F


 


Pulse Rate 84 76


 


Respiratory 16 17





Rate  


 


Blood Pressure 116/56 117/54





(mmHg)  


 


O2 Sat by Pulse 94 96





Oximetry  











Oxygen Devices in Use Now: None


Appearance: Elderly male sitting up in bed, NAD


Eyes: No Scleral Icterus


Ears/Nose/Mouth/Throat: Mucous Membranes Moist


Respiratory: Symmetrical Chest Expansion and Respiratory Effort, Clear to 

Auscultation


Cardiovascular: NL Sounds; No Murmurs; No JVD, RRR, No Edema


Abdominal: NL Sounds; No Tenderness; No Distention


Extremities: No Clubbing, Cyanosis


Skin: No Nodules or Sclerosis


Neurological: Alert and Oriented x 3, - - speech is slow but mostly clear


Result Diagrams: 


 07/27/18 06:54





 07/27/18 06:54





Assess/Plan/Problems-Billing


Mr Garcia is an 84 yo M who has a h/o metastatic high grade prostate cancer, 

type II DM and HTN who was initially admitted to [facility] for a pontine CVA, 

discharged to [facility] for acute rehab but then began to develop significant 

hematuria and anemia. He was taken for cystoscopy where he was found to have 

multiple penile uretheral lesions that were actively bleeding and felt to be 

metastatic lesions. 





- Patient Problems


(1) Acute blood loss anemia


Current Visit: Yes   Status: Acute   Code(s): D62 - ACUTE POSTHEMORRHAGIC 

ANEMIA   SNOMED Code(s): 825277356


   Comment: Secondary to bleeding into the penis and hematuria from bleeding 

uretheral implants. H/H stable today. His ASA and plavix discontinued. Will 

transfuse for Hb<8.    





(2) Prostate cancer metastatic to multiple sites


Current Visit: Yes   Status: Acute   Code(s): C61 - MALIGNANT NEOPLASM OF 

PROSTATE   SNOMED Code(s): 48786012


   Comment: The patient had been managed as an outpatient by Dr. Bacon for 

metastatic prostate cancer (single met to ischium). With the marked hematuria 

he was taken for cystoscopy where he was found to have uretheral implants that 

are likely metastatic prostate cancer. Fulgeration of the bleeding lesions was 

carried out but could not completely stop the bleeding. He is now undergoing 

palliative XRT (5 treatments) to the penis. Continue casodex. Oncology to 

follow.    





(3) Right pontine stroke


Current Visit: No   Status: Acute   Priority: High   Code(s): I63.50 - CEREB 

INFRC DUE TO UNSP OCCLS OR STENOS OF UNSP CEREB ARTERY   SNOMED Code(s): 

279640055


   Comment: Reorder PT/OT/speech. If bleeding comes under control will try to 

add back ASA and closely monitor for bleeding. Continue statin. Will need LAINEY 

on d/c as pt plateaued on PMRU.    





(4) CKD stage 3 due to type 2 diabetes mellitus


Current Visit: Yes   Status: Chronic   Code(s): E11.22 - TYPE 2 DIABETES 

MELLITUS W DIABETIC CHRONIC KIDNEY DISEASE; N18.3 - CHRONIC KIDNEY DISEASE, 

STAGE 3 (MODERATE)   SNOMED Code(s): 619957123801


   Comment: Creatinine is stable. Monitor intermittently.    





(5) Diabetes mellitus


Current Visit: Yes   Status: Chronic   Code(s): E11.9 - TYPE 2 DIABETES 

MELLITUS WITHOUT COMPLICATIONS   SNOMED Code(s): 00525034


   Comment: Blood sugar low this AM. Decrease glyburide to 2.5mg BID. Continue 

lispro sliding scale.    





(6) DVT prophylaxis


Current Visit: Yes   Status: Acute   Code(s): YGY4862 -    SNOMED Code(s): 

613954801


   Comment: SCDs   





(7) DNR (do not resuscitate)


Current Visit: Yes   Status: Acute

## 2018-07-28 LAB
HCT VFR BLD AUTO: 25 % (ref 42–52)
HGB BLD-MCNC: 8.3 G/DL (ref 14–18)
MCH RBC QN AUTO: 28 PG (ref 27–31)
MCHC RBC AUTO-ENTMCNC: 33 G/DL (ref 31–36)
MCV RBC AUTO: 83 FL (ref 80–94)
PLATELET # BLD AUTO: 304 10^3/UL (ref 150–450)
RBC # BLD AUTO: 3.03 10^6/UL (ref 4–5.4)
WBC # BLD AUTO: 6.5 10^3/UL (ref 3.5–10.8)

## 2018-07-28 RX ADMIN — BICALUTAMIDE SCH MG: 50 TABLET ORAL at 10:29

## 2018-07-28 RX ADMIN — ACETAMINOPHEN PRN MG: 325 TABLET ORAL at 19:48

## 2018-07-28 RX ADMIN — ATORVASTATIN CALCIUM SCH MG: 20 TABLET, FILM COATED ORAL at 18:27

## 2018-07-28 RX ADMIN — DOCUSATE SODIUM SCH MG: 100 CAPSULE, LIQUID FILLED ORAL at 10:29

## 2018-07-28 RX ADMIN — DOCUSATE SODIUM SCH MG: 100 CAPSULE, LIQUID FILLED ORAL at 19:48

## 2018-07-28 RX ADMIN — FLUOXETINE SCH MG: 20 CAPSULE ORAL at 10:29

## 2018-07-28 RX ADMIN — INSULIN LISPRO SCH: 100 INJECTION, SOLUTION INTRAVENOUS; SUBCUTANEOUS at 16:48

## 2018-07-28 RX ADMIN — GLYBURIDE SCH: 5 TABLET ORAL at 09:10

## 2018-07-28 RX ADMIN — FLUOXETINE SCH: 20 CAPSULE ORAL at 10:38

## 2018-07-28 RX ADMIN — INSULIN LISPRO SCH: 100 INJECTION, SOLUTION INTRAVENOUS; SUBCUTANEOUS at 09:10

## 2018-07-28 RX ADMIN — INSULIN LISPRO SCH: 100 INJECTION, SOLUTION INTRAVENOUS; SUBCUTANEOUS at 21:40

## 2018-07-28 RX ADMIN — POLYETHYLENE GLYCOL 3350 SCH GM: 17 POWDER, FOR SOLUTION ORAL at 10:29

## 2018-07-28 RX ADMIN — INSULIN LISPRO SCH: 100 INJECTION, SOLUTION INTRAVENOUS; SUBCUTANEOUS at 12:49

## 2018-07-28 NOTE — PN
Subjective


Date of Service: 07/28/18


Interval History: 





Pt is feeling sleepy this AM. He states he slept well last night. He states he 

is sleepy all the time. He does not want to get out of bed to the chair, he 

does not want to eat breakfast. He denies depression as the cause of his being 

tired. 





Objective


Active Medications: 








Acetaminophen (Tylenol Tab*)  650 mg PO Q6H PRN


   PRN Reason: PAIN


Atorvastatin Calcium (Lipitor*)  20 mg PO 1700 [facility]


   Last Admin: 07/27/18 16:32 Dose:  20 mg


Bicalutamide (Casodex (Nf))  50 mg PO DAILY [facility]; Protocol


Bisacodyl (Dulcolax Supp*)  10 mg ME DAILY PRN


   PRN Reason: CONSTIPATION


Dextrose (D50w Syringe 50 Ml*)  12.5 gm IV PUSH .FOR FS < 60 - SS PRN


   PRN Reason: FS < 60


Docusate Sodium (Colace Cap*)  100 mg PO BID [facility]


   Last Admin: 07/27/18 22:16 Dose:  100 mg


Fluoxetine HCl (Prozac Cap*)  20 mg PO QAM [facility]


   Last Admin: 07/27/18 08:50 Dose:  20 mg


Glyburide (Diabeta Tab*)  2.5 mg PO 0800 [facility]


Insulin Human Lispro (Humalog*)  0 units SUBCUT ACHS [facility]; Protocol


   Last Admin: 07/27/18 22:10 Dose:  Not Given


Magnesium Hydroxide (Milk Of Magnesia Liq*)  30 ml PO Q6H PRN


   PRN Reason: CONSTIPATION


Phenyleph/Shark Oil/Min Oil/Petrol (Preparation H*)  1 applic ME TID PRN


   PRN Reason: hemorrhoids


Polyethylene Glycol/Electrolytes (Miralax*)  17 gm PO DAILY [facility]


   Last Admin: 07/27/18 08:50 Dose:  17 gm


Senna (Senokot Tab*)  2 tab PO BEDTIME PRN


   PRN Reason: CONSTIPATION








 Vital Signs - 8 hr











  07/28/18 07/28/18





  03:17 03:28


 


Temperature 98.2 F 


 


Pulse Rate 130 100


 


Respiratory 16 





Rate  


 


Blood Pressure 120/57 





(mmHg)  


 


O2 Sat by Pulse 95 





Oximetry  











Oxygen Devices in Use Now: None


Appearance: Elderly male sitting up in bed, sleepy but talks with me, he 

appears withdrawn, NAD


Eyes: No Scleral Icterus


Ears/Nose/Mouth/Throat: Mucous Membranes Moist


Respiratory: Symmetrical Chest Expansion and Respiratory Effort, Clear to 

Auscultation - few crackles bibasilar


Cardiovascular: NL Sounds; No Murmurs; No JVD, RRR, No Edema


Abdominal: NL Sounds; No Tenderness; No Distention


Extremities: No Clubbing, Cyanosis


Skin: No Nodules or Sclerosis


Neurological: - - sleepy and withdrawn appearing


Result Diagrams: 


 07/28/18 06:06





 07/28/18 06:06





Assess/Plan/Problems-Billing


Mr Garcia is an 86 yo M who has a h/o metastatic high grade prostate cancer, 

type II DM and HTN who was initially admitted to [facility] for a pontine CVA, 

discharged to [facility] for acute rehab but then began to develop significant 

hematuria and anemia. He was taken for cystoscopy where he was found to have 

multiple penile uretheral lesions that were actively bleeding and felt to be 

metastatic lesions. 





- Patient Problems


(1) Acute blood loss anemia


Current Visit: Yes   Status: Acute   Code(s): D62 - ACUTE POSTHEMORRHAGIC 

ANEMIA   SNOMED Code(s): 646031826


   Comment: Secondary to bleeding into the penis and hematuria from bleeding 

uretheral implants. H/H stable for the last few days. Transfuse for Hb<8.   





(2) Prostate cancer metastatic to multiple sites


Current Visit: Yes   Status: Acute   Code(s): C61 - MALIGNANT NEOPLASM OF 

PROSTATE   SNOMED Code(s): 85954995


   Comment: The patient had been managed as an outpatient by Dr. Bacon for 

metastatic prostate cancer (single met to ischium). With the marked hematuria 

he was taken for cystoscopy where he was found to have uretheral implants that 

are likely metastatic prostate cancer. Fulgeration of the bleeding lesions was 

carried out but could not completely stop the bleeding. He is now undergoing 

palliative XRT to the penis-he has had 2 of 5. Likely to resume Monday. 

Continue casodex. Oncology to follow.    





(3) Right pontine stroke


Current Visit: Yes   Status: Acute   Code(s): I63.50 - CEREB INFRC DUE TO UNSP 

OCCLS OR STENOS OF UNSP CEREB ARTERY   SNOMED Code(s): 319495877


   Comment: Speech evaluated the pt last night. No significant change in his 

swallow abilities. Continue current diet. PT/OT evals. Will be d/jorge to LAINEY 

after completing XRT.   





(4) CKD stage 3 due to type 2 diabetes mellitus


Current Visit: Yes   Status: Chronic   Code(s): E11.22 - TYPE 2 DIABETES 

MELLITUS W DIABETIC CHRONIC KIDNEY DISEASE; N18.3 - CHRONIC KIDNEY DISEASE, 

STAGE 3 (MODERATE)   SNOMED Code(s): 336252556675


   Comment: Creatinine is stable. Monitor intermittently.    





(5) Diabetes mellitus


Current Visit: Yes   Status: Chronic   Code(s): E11.9 - TYPE 2 DIABETES 

MELLITUS WITHOUT COMPLICATIONS   SNOMED Code(s): 42005410


   Comment: Stop evening glyburide and monitor blood sugars. He is not eating 

much and appears to be withdrawn. I suspect he is depressed leading to 

decreased oral intake.    





(6) Depression


Current Visit: Yes   Status: Acute   Code(s): F32.9 - MAJOR DEPRESSIVE DISORDER

, SINGLE EPISODE, UNSPECIFIED   SNOMED Code(s): 97132219


   Comment: Post stroke depression. Continue prozac   





(7) DVT prophylaxis


Current Visit: Yes   Status: Acute   Code(s): XVJ7409 -    SNOMED Code(s): 

958460377


   Comment: SCDs   





(8) DNR (do not resuscitate)


Current Visit: Yes   Status: Acute

## 2018-07-28 NOTE — RADMED
CC:  Dr. Lavon Ibrahim; Dr. Raúl Bacon; Johnny Manrique MD; Dr. Wolff *

 

RADIATION ONCOLOGY INPATIENT CONSULTATION:

 

DATE OF CONSULT:  07/26/18 - ROOM #410

 

REFERRING PHYSICIAN:  Dr. Bacon.

 

DIAGNOSES:  Metastatic prostate cancer,  AJCC stage 4 [place] 5+4=9, PSA at 
diagnosis 51.933 ng/mL.

 

HISTORY OF PRESENT ILLNESS:  Samuel Garcia is an 85-year-old gentleman who 
presented with locally advanced prostate cancer and markedly elevated PSA as 
above.  On 01/02/18, he underwent transrectal ultrasound guided biopsy of the 
prostate which identified adenocarcinoma involving the left apex and base with 
[place] 8 disease and the right apex and base Matt 5+4=9 disease.  He 
received androgen deprivation with significant improvement in his PSA most 
recently 0.132 ng/mL on 07/26/18.  He had a stroke and has been in the hospital 
on anticoagulation, and has had gross hematuria leading to substantial anemia.  
On 07/25/18, he underwent cystoscopy with Dr. Bacon who identified multiple 
penile nodules eroding into the penile and bulbar urethra with clots inside the 
bladder as well.  These were evacuated and biopsied, pathology pending, but the 
impression is for the urethral penile metastasis with active bleeding.  He is 
referred for consideration of urgent palliative radiation therapy.

 

PAST MEDICAL HISTORY:  Prostate cancer as in the history of present illness. 
Stroke as in the history of present illness.  History of diabetes, hypertension
, kidney disease.

 

FAMILY HISTORY:  Noncontributory.

 

SOCIAL HISTORY:  He is a former smoker in the distant past.

 

REVIEW OF SYSTEMS:  As in the history of present illness, otherwise he has no 
other symptomatic complaints or concerns reported.

 

PHYSICAL EXAMINATION:  Vital Signs:  Temperature 97.2, pulse rate 79, 
respiratory rate 16, oxygen saturation 99%, blood pressure 110/48.  General:  
He is awake, alert, oriented, in no acute distress, flat affect.  Normocephalic
, atraumatic. Sclerae anicteric.  Neck:  Supple.  Full range of motion.  No 
masses palpable in the neck or thyroid.  Lungs:  Symmetric, air entry 
bilaterally.  Cardiovascular: S1, S2.  Regular.  Abdomen:  Soft, nontender.  No 
masses.  No organomegaly. Genitourinary:  Multiple palpable indurated nodules 
of the penis without skin erosion or invasion.  Marshall catheter in place with 
blood in the catheter bag. Extremities:  No cyanosis or substantial edema.

 

Pathology:  Reviewed as in history of present illness.

 

ASSESSMENT AND PLAN:  Mr. Garcia is an 85-year-old gentleman with high-grade 
locally advanced prostate cancer with high suspicion for penile metastasis, 
pathology pending, and active bleeding visualized from tumor invasion into the 
urethra leading to anemia, in the setting of recent stroke.  I did review his 
history as well as pathologic and radiographic findings, and discussed at some 
length with the patient.  I explained logistics and rationale for consideration 
of palliative radiation therapy including risks, benefits, and alternatives as 
well as the acute and long-term frequent and uncommon toxicities.  Palliative 
radiation therapy can help to provide hemostasis from tumor bleeding.  It would 
be an option for the patient.  With his overall situation, consideration of 
palliative care and hospice is also reasonable and we did discuss this at some 
length.  I did answer his questions to the best of my ability.  He is inclined 
to proceed with radiation therapy as discussed, and did sign informed consent.  
He will undergo CT simulation today to facilitate treatment planning.  It is my 
intention to deliver 2000 cGy in 5 fractions, tentative treatment start today 
as well because of the urgency of his bleeding.

 

Thank you for giving me the opportunity to participate in the care of this very 
pleasant gentleman.

 

 [id number]/[facility] #: [id number]

ALISA

## 2018-07-29 LAB
HCT VFR BLD AUTO: 25 % (ref 42–52)
HGB BLD-MCNC: 8.4 G/DL (ref 14–18)
MCH RBC QN AUTO: 28 PG (ref 27–31)
MCHC RBC AUTO-ENTMCNC: 33 G/DL (ref 31–36)
MCV RBC AUTO: 82 FL (ref 80–94)
PLATELET # BLD AUTO: 301 10^3/UL (ref 150–450)
RBC # BLD AUTO: 3.06 10^6/UL (ref 4–5.4)
WBC # BLD AUTO: 4.8 10^3/UL (ref 3.5–10.8)

## 2018-07-29 RX ADMIN — ACETAMINOPHEN PRN MG: 325 TABLET ORAL at 03:04

## 2018-07-29 RX ADMIN — INSULIN LISPRO SCH: 100 INJECTION, SOLUTION INTRAVENOUS; SUBCUTANEOUS at 17:37

## 2018-07-29 RX ADMIN — INSULIN LISPRO SCH: 100 INJECTION, SOLUTION INTRAVENOUS; SUBCUTANEOUS at 08:04

## 2018-07-29 RX ADMIN — GLYBURIDE SCH: 5 TABLET ORAL at 09:08

## 2018-07-29 RX ADMIN — DOCUSATE SODIUM SCH MG: 100 CAPSULE, LIQUID FILLED ORAL at 09:49

## 2018-07-29 RX ADMIN — ATORVASTATIN CALCIUM SCH MG: 20 TABLET, FILM COATED ORAL at 18:10

## 2018-07-29 RX ADMIN — BICALUTAMIDE SCH MG: 50 TABLET ORAL at 09:50

## 2018-07-29 RX ADMIN — POLYETHYLENE GLYCOL 3350 SCH: 17 POWDER, FOR SOLUTION ORAL at 09:51

## 2018-07-29 RX ADMIN — INSULIN LISPRO SCH: 100 INJECTION, SOLUTION INTRAVENOUS; SUBCUTANEOUS at 11:56

## 2018-07-29 RX ADMIN — DOCUSATE SODIUM SCH MG: 100 CAPSULE, LIQUID FILLED ORAL at 20:28

## 2018-07-29 RX ADMIN — FLUOXETINE SCH MG: 20 CAPSULE ORAL at 09:49

## 2018-07-29 RX ADMIN — INSULIN LISPRO SCH: 100 INJECTION, SOLUTION INTRAVENOUS; SUBCUTANEOUS at 20:18

## 2018-07-29 NOTE — PN
Subjective


Date of Service: 07/29/18


Interval History: 





Pt is feeling tired again today. We had a discussion today about how he sees 

things going forward. He states that if he is going to be dependent on someone 

to take care of him he is not sure he would want to go on living like that. We 

spoke about possibly making a hospice referral and he seemed interested but 

wanted to talk to his son Lawson first. Additionally he feels like he is a burden 

on his son and he does not want to be. 





Objective


Active Medications: 








Acetaminophen (Tylenol Tab*)  650 mg PO Q6H PRN


   PRN Reason: PAIN


   Last Admin: 07/29/18 03:04 Dose:  650 mg


Atorvastatin Calcium (Lipitor*)  20 mg PO 1700 [facility]


   Last Admin: 07/28/18 18:27 Dose:  20 mg


Bicalutamide (Casodex (Nf))  50 mg PO DAILY [facility]; Protocol


   Last Admin: 07/29/18 09:50 Dose:  50 mg


Bisacodyl (Dulcolax Supp*)  10 mg [place] DAILY PRN


   PRN Reason: CONSTIPATION


Dextrose (D50w Syringe 50 Ml*)  12.5 gm IV PUSH .FOR FS < 60 - SS PRN


   PRN Reason: FS < 60


Docusate Sodium (Colace Cap*)  100 mg PO BID [facility]


   Last Admin: 07/29/18 09:49 Dose:  100 mg


Fluoxetine HCl (Prozac Cap*)  40 mg PO QAM [facility]


   Last Admin: 07/29/18 09:49 Dose:  40 mg


Insulin Human Lispro (Humalog*)  0 units SUBCUT ACHS [facility]; Protocol


   Last Admin: 07/29/18 11:56 Dose:  Not Given


Magnesium Hydroxide (Milk Of Magnesia Liq*)  30 ml PO Q6H PRN


   PRN Reason: CONSTIPATION


Ondansetron HCl (Zofran Inj*)  4 mg IV Q6H PRN


   PRN Reason: NAUSEA


   Last Admin: 07/29/18 09:57 Dose:  4 mg


Phenyleph/Shark Oil/Min Oil/Petrol (Preparation H*)  1 applic [place] TID PRN


   PRN Reason: hemorrhoids


Polyethylene Glycol/Electrolytes (Miralax*)  17 gm PO DAILY [facility]


   Last Admin: 07/29/18 09:51 Dose:  Not Given


Senna (Senokot Tab*)  2 tab PO BEDTIME PRN


   PRN Reason: CONSTIPATION








 Vital Signs - 8 hr











  07/29/18 07/29/18





  07:29 11:41


 


Temperature 97.6 F 97.5 F


 


Pulse Rate 67 73


 


Respiratory 17 16





Rate  


 


Blood Pressure 128/61 119/66





(mmHg)  


 


O2 Sat by Pulse 96 95





Oximetry  











Oxygen Devices in Use Now: None


Appearance: Elderly male sitting up in bed, NAD


Eyes: No Scleral Icterus


Ears/Nose/Mouth/Throat: Mucous Membranes Moist


Respiratory: Symmetrical Chest Expansion and Respiratory Effort, Clear to 

Auscultation


Cardiovascular: NL Sounds; No Murmurs; No JVD, RRR, No Edema


Abdominal: NL Sounds; No Tenderness; No Distention


Extremities: No Clubbing, Cyanosis


Skin: No Nodules or Sclerosis


Neurological: Alert and Oriented x 3, - - speech is slowed but not slurred


Result Diagrams: 


 07/29/18 06:34





 07/28/18 06:06





Assess/Plan/Problems-Billing


Mr Garcia is an 84 yo M who has a h/o metastatic high grade prostate cancer, 

type II DM and HTN who [place] [facility] [place], 

discharged to [facility] for acute rehab but then began to develop significant 

hematuria and anemia. He was taken for cystoscopy where he was found to have 

multiple penile uretheral lesions that were actively bleeding and felt to be 

metastatic lesions. 





- Patient Problems


(1) Acute blood loss anemia


Current Visit: Yes   Status: Acute   Code(s): D62 - ACUTE POSTHEMORRHAGIC 

ANEMIA   SNOMED Code(s): 112417083


   Comment: Secondary to bleeding into the penis and hematuria from bleeding 

uretheral implants. H/H stable.   





(2) Prostate cancer metastatic to multiple sites


Current Visit: Yes   Status: Acute   Code(s): C61 - MALIGNANT NEOPLASM OF 

PROSTATE   SNOMED Code(s): 46622784


   Comment: The patient had been managed as an outpatient by Dr. Bacon for 

metastatic prostate cancer (single met to ischium). With the marked hematuria 

he was taken for cystoscopy where he was found to have uretheral implants that 

are likely metastatic prostate cancer. He is now undergoing palliative XRT to 

the penis-he has had 2 of 5 treatments. Continue casodex. Oncology to follow up 

outpatient.    





(3) Right pontine stroke


Current Visit: Yes   Status: Acute   Code(s): I63.50 - CEREB INFRC DUE TO UNSP 

OCCLS OR STENOS OF UNSP CEREB ARTERY   SNOMED Code(s): 167575949


   Comment: Will need LAINEY on d/c. Will restart ASA 81mg daily tomorrow.    





(4) CKD stage 3 due to type 2 diabetes mellitus


Current Visit: Yes   Status: Chronic   Code(s): E11.22 - TYPE 2 DIABETES 

MELLITUS W DIABETIC CHRONIC KIDNEY DISEASE; N18.3 - CHRONIC KIDNEY DISEASE, 

STAGE 3 (MODERATE)   SNOMED Code(s): 712202240083


   Comment: Creatinine is stable. Monitor intermittently.    





(5) Diabetes mellitus


Current Visit: Yes   Status: Chronic   Code(s): E11.9 - TYPE 2 DIABETES 

MELLITUS WITHOUT COMPLICATIONS   SNOMED Code(s): 99661730


   Comment: Stop glyburide all together as pt is not eating much and he has 

been having low sugars.    





(6) Depression


Current Visit: Yes   Status: Acute   Code(s): F32.9 - MAJOR DEPRESSIVE DISORDER

, SINGLE EPISODE, UNSPECIFIED   SNOMED Code(s): 36744129


   Comment: Post stroke depression. Continue prozac-dose increased to 40mg.   





(7) DVT prophylaxis


Current Visit: Yes   Status: Acute   Code(s): DEH9499 -    SNOMED Code(s): 

031149731


   Comment: SCDs   





(8) DNR (do not resuscitate)


Current Visit: Yes   Status: Acute

## 2018-07-30 RX ADMIN — POLYETHYLENE GLYCOL 3350 SCH GM: 17 POWDER, FOR SOLUTION ORAL at 09:40

## 2018-07-30 RX ADMIN — ASPIRIN SCH: 81 TABLET, CHEWABLE ORAL at 09:40

## 2018-07-30 RX ADMIN — INSULIN LISPRO SCH UNIT: 100 INJECTION, SOLUTION INTRAVENOUS; SUBCUTANEOUS at 20:27

## 2018-07-30 RX ADMIN — DOCUSATE SODIUM SCH: 100 CAPSULE, LIQUID FILLED ORAL at 20:20

## 2018-07-30 RX ADMIN — INSULIN LISPRO SCH: 100 INJECTION, SOLUTION INTRAVENOUS; SUBCUTANEOUS at 12:06

## 2018-07-30 RX ADMIN — BICALUTAMIDE SCH MG: 50 TABLET ORAL at 09:39

## 2018-07-30 RX ADMIN — DOCUSATE SODIUM SCH MG: 100 CAPSULE, LIQUID FILLED ORAL at 09:40

## 2018-07-30 RX ADMIN — INSULIN LISPRO SCH: 100 INJECTION, SOLUTION INTRAVENOUS; SUBCUTANEOUS at 16:48

## 2018-07-30 RX ADMIN — FLUOXETINE SCH MG: 20 CAPSULE ORAL at 09:40

## 2018-07-30 RX ADMIN — ATORVASTATIN CALCIUM SCH MG: 20 TABLET, FILM COATED ORAL at 17:55

## 2018-07-30 RX ADMIN — INSULIN LISPRO SCH: 100 INJECTION, SOLUTION INTRAVENOUS; SUBCUTANEOUS at 09:33

## 2018-07-30 NOTE — PN
Subjective


Date of Service: 07/30/18


Interval History: 





Pt is feeling ok. He states he was a little hungry this AM so he ate half a 

banana. He has not had a chance to talk with his son about options moving 

forward (hospice, NH).





Objective


Active Medications: 








Acetaminophen (Tylenol Tab*)  650 mg PO Q6H PRN


   PRN Reason: PAIN


   Last Admin: 07/29/18 03:04 Dose:  650 mg


Aspirin (Aspirin 81 Mg Chew Tab*)  [facility]ILY [facility]


Atorvastatin Calcium (Lipitor*)  20 mg PO [facility]00 [facility]


   Last Admin: 07/29/18 18:10 Dose:  20 mg


Bicalutamide (Casodex (Nf))  50 mg PO DAILY [facility]; Protocol


   Last Admin: 07/29/18 09:50 Dose:  50 mg


Bisacodyl (Dulcolax Supp*)  10 mg [place] DAILY PRN


   PRN Reason: CONSTIPATION


Dextrose (D50w Syringe 50 Ml*)  12.5 gm IV PUSH .FOR FS < 60 - SS PRN


   PRN Reason: FS < 60


Docusate Sodium (Colace Cap*)  100 mg PO BID [facility]


   Last Admin: 07/29/18 20:28 Dose:  100 mg


Fluoxetine HCl (Prozac Cap*)  40 mg PO QAM [facility]


   Last Admin: 07/29/18 09:49 Dose:  40 mg


Insulin Human Lispro (Humalog*)  0 units SUBCUT ACHS [facility]; Protocol


   Last Admin: 07/29/18 20:18 Dose:  Not Given


Magnesium Hydroxide (Milk Of Magnesia Liq*)  30 ml PO Q6H PRN


   PRN Reason: CONSTIPATION


Ondansetron HCl (Zofran Inj*)  4 mg IV Q6H PRN


   PRN Reason: NAUSEA


   Last Admin: 07/29/18 09:57 Dose:  4 mg


Phenyleph/Shark Oil/Min Oil/Petrol (Preparation H*)  1 applic [place] TID PRN


   PRN Reason: hemorrhoids


Polyethylene Glycol/Electrolytes (Miralax*)  17 gm PO DAILY [facility]


   Last Admin: 07/29/18 09:51 Dose:  Not Given


Senna (Senokot Tab*)  2 tab PO BEDTIME PRN


   PRN Reason: CONSTIPATION








 Vital Signs - 8 hr











  07/30/18 07/30/18





  03:17 03:40


 


Temperature 98.2 F 


 


Pulse Rate 43 


 


Blood Pressure  102/54





(mmHg)  


 


O2 Sat by Pulse 95 





Oximetry  











Oxygen Devices in Use Now: None


Appearance: Elderly male sitting up in bed, NAD


Eyes: No Scleral Icterus


Ears/Nose/Mouth/Throat: Mucous Membranes Moist


Respiratory: Symmetrical Chest Expansion and Respiratory Effort, Clear to 

Auscultation


Cardiovascular: NL Sounds; No Murmurs; No JVD, RRR, No Edema


Abdominal: NL Sounds; No Tenderness; No Distention


Extremities: No Clubbing, Cyanosis


Skin: No Nodules or Sclerosis


Neurological: Alert and Oriented x 3


Result Diagrams: 


 07/29/18 06:34





 07/28/18 06:06





Assess/Plan/Problems-Billing


Mr Garcia is an 86 yo M who has a h/o metastatic high grade prostate cancer, 

type II DM and HTN who was initially admitted to [facility] for a pontine CVA, 

discharged to [facility] for acute rehab but then began to develop significant 

hematuria and anemia. He was taken for cystoscopy where he was found to have 

multiple penile uretheral lesions that were actively bleeding and felt to be 

metastatic lesions. 





- Patient Problems


(1) Acute blood loss anemia


Current Visit: Yes   Status: Acute   Code(s): D62 - ACUTE POSTHEMORRHAGIC 

ANEMIA   SNOMED Code(s): 849595810


   Comment: Secondary to bleeding into the penis and hematuria from bleeding 

uretheral implants. H/H stable-urine has cleared up.   





(2) Prostate cancer metastatic to multiple sites


Current Visit: Yes   Status: Acute   Code(s): C61 - MALIGNANT NEOPLASM OF 

PROSTATE   SNOMED Code(s): 50316656


   Comment: Continue XRT x3 more treatments. Continue casodex. Will need 

oncology follow up to determine if he would be a candidate for any further 

treatment. Additionally the patient states he does not want to live dependent 

on anyone to help him and would open to talk with hospice if his son agrees.    





(3) Right pontine stroke


Current Visit: Yes   Status: Acute   Code(s): I63.50 - CEREB INFRC DUE TO UNSP 

OCCLS OR STENOS OF UNSP CEREB ARTERY   SNOMED Code(s): 794496950


   Comment: Will need LAINEY on d/c. Restart ASA 81mg today.    





(4) CKD stage 3 due to type 2 diabetes mellitus


Current Visit: Yes   Status: Chronic   Code(s): E11.22 - TYPE 2 DIABETES 

MELLITUS W DIABETIC CHRONIC KIDNEY DISEASE; N18.3 - CHRONIC KIDNEY DISEASE, 

STAGE 3 (MODERATE)   SNOMED Code(s): 320346910839


   Comment: Creatinine is stable. Monitor intermittently.    





(5) Diabetes mellitus


Current Visit: Yes   Status: Chronic   Code(s): E11.9 - TYPE 2 DIABETES 

MELLITUS WITHOUT COMPLICATIONS   SNOMED Code(s): 09571702


   Comment: Sugars acceptable without medication. Monitor.    





(6) Depression


Current Visit: Yes   Status: Acute   Code(s): F32.9 - MAJOR DEPRESSIVE DISORDER

, SINGLE EPISODE, UNSPECIFIED   SNOMED Code(s): 22101753


   Comment: Post stroke depression. Continue prozac.   





(7) DVT prophylaxis


Current Visit: Yes   Status: Acute   Code(s): [id number] -    SNOMED Code(s): 

917938450


   Comment: SCDs   





(8) DNR (do not resuscitate)


Current Visit: Yes   Status: Acute

## 2018-07-31 RX ADMIN — ASPIRIN SCH MG: 81 TABLET, CHEWABLE ORAL at 07:56

## 2018-07-31 RX ADMIN — INSULIN LISPRO SCH UNIT: 100 INJECTION, SOLUTION INTRAVENOUS; SUBCUTANEOUS at 17:10

## 2018-07-31 RX ADMIN — INSULIN LISPRO SCH: 100 INJECTION, SOLUTION INTRAVENOUS; SUBCUTANEOUS at 07:48

## 2018-07-31 RX ADMIN — BICALUTAMIDE SCH MG: 50 TABLET ORAL at 07:56

## 2018-07-31 RX ADMIN — INSULIN LISPRO SCH UNIT: 100 INJECTION, SOLUTION INTRAVENOUS; SUBCUTANEOUS at 12:36

## 2018-07-31 RX ADMIN — DOCUSATE SODIUM SCH MG: 100 CAPSULE, LIQUID FILLED ORAL at 20:36

## 2018-07-31 RX ADMIN — DOCUSATE SODIUM SCH MG: 100 CAPSULE, LIQUID FILLED ORAL at 07:56

## 2018-07-31 RX ADMIN — ATORVASTATIN CALCIUM SCH MG: 20 TABLET, FILM COATED ORAL at 17:11

## 2018-07-31 RX ADMIN — FLUOXETINE SCH MG: 20 CAPSULE ORAL at 07:56

## 2018-07-31 RX ADMIN — POLYETHYLENE GLYCOL 3350 SCH GM: 17 POWDER, FOR SOLUTION ORAL at 07:56

## 2018-07-31 RX ADMIN — INSULIN LISPRO SCH UNIT: 100 INJECTION, SOLUTION INTRAVENOUS; SUBCUTANEOUS at 20:36

## 2018-07-31 NOTE — PN
Subjective


Date of Service: 07/31/18


Interval History: 





Pt is feeling ok this AM. He ate all of his breakfast this AM as he did not 

feel overwhelmed with what he was presented. He has also worked with PT this 

AM. He is not sure what he wants to do about hospice. His son Lawson called, I 

gave an update. Lawson requests a referral be placed to hospice. 





Objective


Active Medications: 








Acetaminophen (Tylenol Tab*)  650 mg PO Q6H PRN


   PRN Reason: PAIN


   Last Admin: 07/29/18 03:04 Dose:  650 mg


Aspirin (Aspirin 81 Mg Chew Tab*)  81 mg PO DAILY [facility]


   Last Admin: 07/31/18 07:56 Dose:  81 mg


Atorvastatin Calcium (Lipitor*)  20 mg PO 1700 [facility]


   Last Admin: 07/30/18 17:55 Dose:  20 mg


Bicalutamide (Casodex (Nf))  50 mg PO DAILY [facility]; Protocol


   Last Admin: 07/31/18 07:56 Dose:  50 mg


Bisacodyl (Dulcolax Supp*)  10 mg [place] DAILY PRN


   PRN Reason: CONSTIPATION


Dextrose (D50w Syringe 50 Ml*)  12.5 gm IV PUSH .FOR FS < 60 - SS PRN


   PRN Reason: FS < 60


Docusate Sodium (Colace Cap*)  100 mg PO BID [facility]


   Last Admin: 07/31/18 07:56 Dose:  100 mg


Fluoxetine HCl (Prozac Cap*)  40 mg PO QAM [facility]


   Last Admin: 07/31/18 07:56 Dose:  40 mg


Insulin Human Lispro (Humalog*)  0 units SUBCUT ACHS [facility]; Protocol


   Last Admin: 07/31/18 07:48 Dose:  Not Given


Magnesium Hydroxide (Milk Of Magnesia Liq*)  30 ml PO Q6H PRN


   PRN Reason: CONSTIPATION


Ondansetron HCl (Zofran Inj*)  4 mg IV Q6H PRN


   PRN Reason: NAUSEA


   Last Admin: 07/29/18 09:57 Dose:  4 mg


Phenyleph/Shark Oil/Min Oil/Petrol (Preparation H*)  1 applic [place] TID PRN


   PRN Reason: hemorrhoids


Polyethylene Glycol/Electrolytes (Miralax*)  17 gm PO DAILY [facility]


   Last Admin: 07/31/18 07:56 Dose:  17 gm


Senna (Senokot Tab*)  2 tab PO BEDTIME PRN


   PRN Reason: CONSTIPATION








 Vital Signs - 8 hr











  07/31/18 07/31/18 07/31/18





  03:32 07:22 07:50


 


Temperature 98.0 F 97.7 F 


 


Pulse Rate 71 65 


 


Respiratory 16 16 16





Rate   


 


Blood Pressure 100/42 103/47 





(mmHg)   


 


O2 Sat by Pulse 95 96 





Oximetry   











Oxygen Devices in Use Now: None


Appearance: Elderly male sitting up on a commode, NAD


Eyes: No Scleral Icterus


Ears/Nose/Mouth/Throat: Mucous Membranes Moist


Respiratory: Symmetrical Chest Expansion and Respiratory Effort, Clear to 

Auscultation


Cardiovascular: NL Sounds; No Murmurs; No JVD, RRR, No Edema


Abdominal: NL Sounds; No Tenderness; No Distention


Extremities: No Clubbing, Cyanosis


Skin: No Nodules or Sclerosis


Neurological: Alert and Oriented x 3, - - depressed affect


Result Diagrams: 


 07/29/18 06:34





 07/28/18 06:06





Assess/Plan/Problems-Billing


Mr Garcia is an 86 yo M who has a h/o metastatic high grade prostate cancer, 

type II DM and HTN who was [place] [facility] [place], 

discharged to [facility] for acute rehab but then began to develop significant 

hematuria and anemia. He was taken for cystoscopy where he was found to have 

multiple penile uretheral lesions that were actively bleeding and felt to be 

metastatic lesions. 





- Patient Problems


(1) Acute blood loss anemia


Current Visit: Yes   Status: Acute   Code(s): D62 - ACUTE POSTHEMORRHAGIC 

ANEMIA   SNOMED Code(s): 871724989


   Comment: Secondary to bleeding into the penis and hematuria from bleeding 

uretheral implants. H/H stable-urine has cleared up.   





(2) Prostate cancer metastatic to multiple sites


Current Visit: Yes   Status: Acute   Code(s): C61 - MALIGNANT NEOPLASM OF 

PROSTATE   SNOMED Code(s): 23261493


   Comment: Continue XRT x2 more treatments. Continue casodex. Will need 

oncology follow up to determine if he would be a candidate for any further 

treatment. Hospice referral to be made.    





(3) Right pontine stroke


Current Visit: Yes   Status: Acute   Code(s): I63.50 - CEREB INFRC DUE TO UNSP 

OCCLS OR STENOS OF UNSP CEREB ARTERY   SNOMED Code(s): 406789960


   Comment: Will need LAINEY on d/c. Restart ASA 81mg today.    





(4) CKD stage 3 due to type 2 diabetes mellitus


Current Visit: Yes   Status: Chronic   Code(s): E11.22 - TYPE 2 DIABETES 

MELLITUS W DIABETIC CHRONIC KIDNEY DISEASE; N18.3 - CHRONIC KIDNEY DISEASE, 

STAGE 3 (MODERATE)   SNOMED Code(s): 621253906370


   Comment: Creatinine is stable. Monitor intermittently.    





(5) Diabetes mellitus


Current Visit: Yes   Status: Chronic   Code(s): E11.9 - TYPE 2 DIABETES 

MELLITUS WITHOUT COMPLICATIONS   SNOMED Code(s): 42021763


   Comment: Sugars acceptable without medication. Monitor.    





(6) Depression


Current Visit: Yes   Status: Acute   Code(s): F32.9 - MAJOR DEPRESSIVE DISORDER

, SINGLE EPISODE, UNSPECIFIED   SNOMED Code(s): 09553464


   Comment: Post stroke depression. Continue prozac.   





(7) DVT prophylaxis


Current Visit: Yes   Status: Acute   Code(s): EWO3485 -    SNOMED Code(s): 

191022545


   Comment: SCDs   





(8) DNR (do not resuscitate)


Current Visit: Yes   Status: Acute

## 2018-08-01 LAB
HCT VFR BLD AUTO: 26 % (ref 42–52)
HGB BLD-MCNC: 8.8 G/DL (ref 14–18)
MCH RBC QN AUTO: 28 PG (ref 27–31)
MCHC RBC AUTO-ENTMCNC: 33 G/DL (ref 31–36)
MCV RBC AUTO: 83 FL (ref 80–94)
PLATELET # BLD AUTO: 286 10^3/UL (ref 150–450)
RBC # BLD AUTO: 3.18 10^6/UL (ref 4–5.4)
WBC # BLD AUTO: 4.7 10^3/UL (ref 3.5–10.8)

## 2018-08-01 RX ADMIN — ATORVASTATIN CALCIUM SCH MG: 20 TABLET, FILM COATED ORAL at 17:21

## 2018-08-01 RX ADMIN — FLUOXETINE SCH MG: 20 CAPSULE ORAL at 07:56

## 2018-08-01 RX ADMIN — POLYETHYLENE GLYCOL 3350 SCH GM: 17 POWDER, FOR SOLUTION ORAL at 07:55

## 2018-08-01 RX ADMIN — DOCUSATE SODIUM SCH MG: 100 CAPSULE, LIQUID FILLED ORAL at 21:24

## 2018-08-01 RX ADMIN — ASPIRIN SCH MG: 81 TABLET, CHEWABLE ORAL at 07:56

## 2018-08-01 RX ADMIN — DOCUSATE SODIUM SCH MG: 100 CAPSULE, LIQUID FILLED ORAL at 07:56

## 2018-08-01 RX ADMIN — INSULIN LISPRO SCH: 100 INJECTION, SOLUTION INTRAVENOUS; SUBCUTANEOUS at 13:02

## 2018-08-01 RX ADMIN — BICALUTAMIDE SCH MG: 50 TABLET ORAL at 07:55

## 2018-08-01 RX ADMIN — INSULIN LISPRO SCH: 100 INJECTION, SOLUTION INTRAVENOUS; SUBCUTANEOUS at 07:55

## 2018-08-01 NOTE — DS
DATE OF ADMISSION:  07/26/2018.

 

DATE OF DISCHARGE:  08/01/2018.

 

PRIMARY CARE PHYSICIAN:  Dr. Ibrahim.

 

UROLOGIST: Dr. Bacon.

 

ONCOLOGIST:  Dr. Wolff.

 

PRINCIPAL DIAGNOSIS:  Acute blood loss anemia secondary to hematuria and 
bleeding into the penis secondary to penile urethral metastatic lesions.

 

SECONDARY DIAGNOSES:

1.  Pontine CVA.

2.  Stage 3 chronic kidney disease.

3.  Type 2 diabetes.

4.  Hypertension.

5.  Metastatic prostate cancer.

 

DISCHARGE MEDICATIONS:

1.  Senna two tabs p.o. at bedtime prn constipation.

2.  MiraLax 17 gm p.o. daily.

3.  Milk of Magnesia 30 ml p.o. q.6 hours prn constipation.

4.  Hydrocortisone suppository 25 mg p.r. b.i.d. prn hemorrhoids.

5.  Hemorrhoidal ointment one application p.r. t.i.d. prn hemorrhoids.

6.  Prozac 40 mg p.o. daily.

7.  Colace 100 mg p.o. b.i.d.

8.  Lipitor 20 mg p.o. daily.

9.  Tylenol 650 mg p.o. q.6 hours prn pain.

10. Aspirin 81 mg p.o. daily.

 

HOSPITAL COURSE:  Mr. Garcia is an 85-year-old male who is admitted to the 
Medical Service from 06/22/2018 through 06/26/2018 where he was diagnosed with 
a subacute pontine CVA.  The patient was started on aspirin and Plavix for the 
CVA and discharged to PMRU.  While on PMRU, the patient began to develop 
significant hematuria.  He was seen in consultation by Dr. Bacon who took 
the patient for cystoscopy.  The patient was found to have multiple penile 
urethral metastatic lesions.  This was felt to be the patient's metastatic 
prostate cancer.  Dr. Bacon fulgurated many of the lesions, but could not 
stop all of the bleeding. It was felt that the patient should be admitted to 
the Medical floor as he had dropped his hemoglobin significantly with the 
bleeding into the penis and the hematuria.  The patient was seen in 
consultation while on PMRU by Dr. Yeh who recommended evaluation for 
possible radiation therapy.  The patient was then seen by Dr. Manrique from 
Radiation Therapy who felt that palliative radiation therapy would be 
beneficial to the patient.  The patient has now received five fractions of XRT.
  His hematuria has completely stopped at this point.  The patient was 
restarted on aspirin this past Monday and has had no hematuria since.  The 
patient's hemoglobin has been stable.  The patient had plateaued while on PMRU 
and the plan was to go to [facility] for subacute rehab; however, this was 
delayed by the hematuria.  Today, the patient is now stable from the acute 
blood loss standpoint and he is ready to go to [facility] for subacute rehab.
  A hospice referral has been made as the patient has not fully been 
participating in his therapy regimen.  The patient at times is more willing to 
participate than others.

 

On the day of discharge, the patient is awake, alert, and oriented, lying in 
bed in no acute distress.  His vital signs are stable.  His cardiac exam 
reveals a normal S1, S2 with a regular rate and rhythm.  His lungs are clear.  
His abdomen is soft, nontender, nondistended.  He is able to move all four 
extremities, though is weak on the left side.  The patient does have also mild 
dysarthria.

 

FOLLOW-UP CONCERNS:  The patient is being discharged to [facility] today, 08/
01/2018.

 

The patient should follow-up with Dr. Wolff in the next two to three weeks, 
and with Dr. Bacon in the next two to three weeks.

 

ACTIVITY LEVEL:  As tolerated.

 

DIET:  Regular as the patient has not been eating much at all.

 

CONDITION ON DISCHARGE:  Stable.

 

 [id number]/CPS #: [id number]

MTDD

## 2018-08-01 NOTE — CONSULT
Consultation





- Reason for Consultation


Reason for Consultation: 





Follow up consultation for metastatic prostate cancer


Ordering Provider: Kaia Dumont


Chief Complaint: 





Hematuria


History of Present Illness: 





Please refer to Dr Wolff's consultation from 7/25/18.  At that time, Dr Wolff suggested he would be a candidate for abiraterone dependent on results 

from biopsy of his uretheral lesions.  Heme/Onc service was asked to clarify 

treatment planning to aid in discharge planning.





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 

morphologically similar to his known prostate cancer.  He has completed 

palliative radiation to his penile urethra and has a Marshall catheter in place.





He reports that has tolerated radiation well.  Gross hematuria has resolved. 

Remains severely weak in his L sided extremities.  No new symptoms or concerns.





Allergies/Medications


Medication: 











Acetaminophen (Tylenol Tab*)  650 mg PO Q6H PRN


   PRN Reason: PAIN


   Last Admin: 07/29/18 03:04 Dose:  650 mg


Aspirin (Aspirin 81 Mg Chew Tab*)  81 mg PO DAILY [facility]


   Last Admin: 08/01/18 07:56 Dose:  81 mg


Atorvastatin Calcium (Lipitor*)  20 mg PO 1700 [facility]


   Last Admin: 07/31/18 17:11 Dose:  20 mg


Bicalutamide (Casodex (Nf))  50 mg PO DAILY [facility]; Protocol


   Last Admin: 08/01/18 07:55 Dose:  50 mg


Bisacodyl (Dulcolax Supp*)  10 mg OR DAILY PRN


   PRN Reason: CONSTIPATION


Dextrose (D50w Syringe 50 Ml*)  12.5 gm IV PUSH .FOR FS < 60 - SS PRN


   PRN Reason: FS < 60


Docusate Sodium (Colace Cap*)  100 mg PO BID [facility]


   Last Admin: 08/01/18 07:56 Dose:  100 mg


Fluoxetine HCl (Prozac Cap*)  40 mg PO QAM [facility]


   Last Admin: 08/01/18 07:56 Dose:  40 mg


Insulin Human Lispro (Humalog*)  0 units SUBCUT ACHS [facility]; Protocol


   Last Admin: 08/01/18 13:02 Dose:  Not Given


Magnesium Hydroxide (Milk Of Magnesia Liq*)  30 ml PO Q6H PRN


   PRN Reason: CONSTIPATION


Ondansetron HCl (Zofran Inj*)  4 mg IV Q6H PRN


   PRN Reason: NAUSEA


   Last Admin: 07/29/18 09:57 Dose:  4 mg


Phenyleph/Shark Oil/Min Oil/Petrol (Preparation H*)  1 applic OR TID PRN


   PRN Reason: hemorrhoids


Polyethylene Glycol/Electrolytes (Miralax*)  17 gm PO DAILY [facility]


   Last Admin: 08/01/18 07:55 Dose:  17 gm


Senna (Senokot Tab*)  2 tab PO BEDTIME PRN


   PRN Reason: CONSTIPATION








Allergies/Adverse Reactions: 


 Allergies











Allergy/AdvReac Type Severity Reaction Status Date / Time


 


amoxicillin Allergy Intermediate Rash And Verified 07/24/18 15:19





   Itching  


 


clavulanic acid Allergy Intermediate Rash And Verified 07/24/18 15:19





[From Augmentin]   Itching  














Review of Systems





- Review of Systems


Constitutional Symptoms: Positive: Weakness, Fatigue


HEENT: Positive: Normal


Eyes: Positive: Normal


Pulmonary: Positive: Normal


Cardiology: Positive: Normal


Gastroenterology: Positive: Normal


Genital - Urinary: Positive: Hematuria, Other - prostate CA


Neurology: Positive: Hx of Stroke\TIA


Psychiatry: Positive: Depression





Physical Exam





- Physical Exam


Physical Examination: 





Gen: frail appearing 86 yo male lying in hospital bed in NAD


: Marshall in place, no hematuria


Psych: alert, appropriate affect to situation


Neuro: speech intact, LUE/LLE weakness





Results





- Lab Results


Lab Results: 


 











  07/29/18 07/29/18 07/30/18





  17:21 20:07 07:10


 


WBC   


 


RBC   


 


Hgb   


 


Hct   


 


MCV   


 


MCH   


 


MCHC   


 


RDW   


 


Plt Count   


 


MPV   


 


Sodium   


 


Potassium   


 


Chloride   


 


Carbon Dioxide   


 


Anion Gap   


 


BUN   


 


Creatinine   


 


Est GFR ( Amer)   


 


Est GFR (Non-Af Amer)   


 


BUN/Creatinine Ratio   


 


Glucose   


 


POC Glucose (mg/dL)  98  97  87


 


Calcium   














  07/30/18 07/30/18 07/30/18





  11:28 16:23 20:01


 


WBC   


 


RBC   


 


Hgb   


 


Hct   


 


MCV   


 


MCH   


 


MCHC   


 


RDW   


 


Plt Count   


 


MPV   


 


Sodium   


 


Potassium   


 


Chloride   


 


Carbon Dioxide   


 


Anion Gap   


 


BUN   


 


Creatinine   


 


Est GFR ( Amer)   


 


Est GFR (Non-Af Amer)   


 


BUN/Creatinine Ratio   


 


Glucose   


 


POC Glucose (mg/dL)  115 H  120 H  153 H


 


Calcium   














  07/31/18 07/31/18 07/31/18





  07:37 11:44 17:05


 


WBC   


 


RBC   


 


Hgb   


 


Hct   


 


MCV   


 


MCH   


 


MCHC   


 


RDW   


 


Plt Count   


 


MPV   


 


Sodium   


 


Potassium   


 


Chloride   


 


Carbon Dioxide   


 


Anion Gap   


 


BUN   


 


Creatinine   


 


Est GFR ( Amer)   


 


Est GFR (Non-Af Amer)   


 


BUN/Creatinine Ratio   


 


Glucose   


 


POC Glucose (mg/dL)  104 H  220 H  203 H


 


Calcium   














  07/31/18 08/01/18 08/01/18





  20:19 05:52 05:52


 


WBC   4.7 


 


RBC   3.18 L 


 


Hgb   8.8 L 


 


Hct   26 L 


 


MCV   83 


 


MCH   28 


 


MCHC   33 


 


RDW   15 


 


Plt Count   286 


 


MPV   7.1 L 


 


Sodium    137


 


Potassium    4.1


 


Chloride    103


 


Carbon Dioxide    26


 


Anion Gap    8


 


BUN    22


 


Creatinine    1.11


 


Est GFR ( Amer)    76.2


 


Est GFR (Non-Af Amer)    63.0


 


BUN/Creatinine Ratio    19.8


 


Glucose    112 H


 


POC Glucose (mg/dL)  144 H  


 


Calcium    8.6














  08/01/18 08/01/18





  07:36 11:55


 


WBC  


 


RBC  


 


Hgb  


 


Hct  


 


MCV  


 


MCH  


 


MCHC  


 


RDW  


 


Plt Count  


 


MPV  


 


Sodium  


 


Potassium  


 


Chloride  


 


Carbon Dioxide  


 


Anion Gap  


 


BUN  


 


Creatinine  


 


Est GFR ( Amer)  


 


Est GFR (Non-Af Amer)  


 


BUN/Creatinine Ratio  


 


Glucose  


 


POC Glucose (mg/dL)  125 H  183 H


 


Calcium  














Assessment and Plan


Impression: 





This is an 86 yo male with metastatic prostate CA which has progressed on 

Lupron and Casodex.  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  He has 

now completed palliative radiation.  Patient is a candidate for abiraterone 

given his progression on Lupron and Casodex.  This treatment option was 

discussed in some detail.  Explained to him he has metastatic disease and his 

treatment would not be curative, but could help to control the progression of 

disease.  Given the poorly differentiated nature of his tumor, he has a lower 

likelihood of response.  After discussion, he decided that he is not interested 

in any life prolonging therapy.


Plan: 





- no plans to pursue additional therapy at this time


- recommended to patient that he discuss his wishes with his daughter, Asha, 

whom he wishes to appoint as his health care proxy


- if his performance status improves during rehab and he would like to revisit 

palliative therapy for his prostate cancer, he is more than welcome to follow 

up with Dr Wolff in clinic


- recommend Hospice consultation

## 2018-08-02 VITALS — SYSTOLIC BLOOD PRESSURE: 129 MMHG | DIASTOLIC BLOOD PRESSURE: 62 MMHG

## 2018-08-02 RX ADMIN — FLUOXETINE SCH MG: 20 CAPSULE ORAL at 09:24

## 2018-08-02 RX ADMIN — BICALUTAMIDE SCH MG: 50 TABLET ORAL at 09:25

## 2018-08-02 RX ADMIN — DOCUSATE SODIUM SCH MG: 100 CAPSULE, LIQUID FILLED ORAL at 09:24

## 2018-08-02 RX ADMIN — ASPIRIN SCH MG: 81 TABLET, CHEWABLE ORAL at 09:24

## 2018-08-02 RX ADMIN — POLYETHYLENE GLYCOL 3350 SCH: 17 POWDER, FOR SOLUTION ORAL at 09:25

## 2023-08-16 NOTE — PMRUTEAM
PMRU: Team Meeting


Current Status: 


 Nursing: Current Status











Skin Deviations [Bilateral     Other





Buttocks]                      


 


Skin Deviations [Midline Back] Abrasion


 


Skin Deviations [Bilateral     Other





Foot]                          


 


Skin Deviation Description [   pink, blanchable





Bilateral Buttocks]            


 


Skin Deviation Description [   old scar - unchanged





Midline Back]                  


 


Skin Deviation Description [   dry skin





Bilateral Foot]                


 


Drain Type [Bilateral Buttocks None





]                              


 


Bladder Current Status         chronic indwelling catheter


 


Bowel Current Status           continues bowel meds


 


Nutrition Current Status       aspiration precautions, nectar thick liquids,





 eating with extra time


 


Medication Current Status      needs reinforement











 Physical Therapy: Current Status











Bed Mobility Assistance        Not Tested


 


Transfer Moblility Assistance  Contact Guard Assist,Min Assist,Mod Assist


 


Transfer/Bed Mobility          Rolling Walker





Recommended Devices            


 


Ambulation Assistance          Not Tested


 


Ambulation Assistive Devices   Rolling Walker


 


Number of Feet Patient         15





Ambulated                      


 


Stairs Assistance              Not Tested


 


Stairs Recommended Devices     Two Rails


 


Number of Stairs               8


 


Curb                           Not Tested


 


Objective Comments             sitting balance with one hand supported, reaching





 opposite hand outside base of support and across





 body switching between L/R for improved trunk





 control and upright posture














 Occupational Therapy: Current Status











Upper Body Dressing            Supervision,Min Assist


 


Upper Body Dressing Progress   set-up with signicantly extra time, sometimes





 needs A to pull around back


 


Lower Body Dressing            Mod Assist,Max Asst


 


Lower Body Dressing Progress   pt starting to thread pants with grabber, total A





 to pull up in standing


 


Bathing                        Min Assist


 


Bathing Progress               assistance for buttocks and feet


 


Toileting                      Total Assist


 


Toilet Transfer                Min Assist,2 Person Assist


 


Toilet Transfer Progress       2 min A pivot to BSC


 


Shower Transfer                Total Assist,2 Person Assist


 


Shower Transfer Progress       EZ stand


 


Eating                         Supervision


 


Eating Progress                set-up with extra time














 Rec Therapy: Current Status











Summary of Assessment and      RT assessment is complete and patient is aware of





Clinical Impression            leisure services. Pt. has been engaged in his





 computer programs during free-time and during





 leisure visits with writerCathy garner during





 these times and is interested in continued leisure





 activities on the unit.


 


Treatment Goals                Patient will engage in recreation and leisure





 activities while on the unit


 


Treatment Plan                 Provide and encourage involvement in RT services














 Social Work: Current Status











Discharge Plan                 return home with home care svs and family support


 


Potential for Family Training  pt's son is involved and supportive


 


Anticipated Discharge          Home





Destination                    


 


Discharge With                 home care svs and family support














 Nutrition: Current Status











Monitoring                     po intake only ~50% yesterday following syncopal





 episode while having a BM.  Otherwise, eating





 approx 75% of meals.  Diet remains consistent carb





 w/nectar-thick liquids (no straws). FS somewhat





 improved w/Glyburide resumed last week, although





 still receiving lower evening dose than at home (2





 .5 mg vs 5 mg). FS ranging . Pt states he





 does like the Ensure pudding being sent daily at





 3pm, so will continue.











 Speech: Current Status











Assessment                     Patient is progressing as expected. Patient 

solved





 math problems, wrote checks, and copied text





 accurately with 95% legible handwriting.


 


Speech Current Status Goal 1   Mild-moderate dysphagia


 


Speech Goal 2 Current Status   Moderate impairment (6/30/18)


 


Speech Goal 3 Current Status   Moderate impairment (6/30/18)














Goals: 


 Physical Therapy: Updated Goals











Transfer/Bed Mobility          Hand Hold





Recommended Devices            














 Occupational Therapy: Initial Goals











Goals to be Completed in (Days 21-28





)                              


 


Upper Body Bathing Routine     Modified Independent with


 


Lower Body Bathing Routine     Modified Independent with


 


Upper Body Dressing Routine    Modified Independent with


 


Lower Body Dressing Routine    Modified Independent with


 


Toilet Hygeine and Clothing    Modified Independent with





Management Routine             


 


Toilet Transfer Routine        Modified Independent with


 


Tub Transfer Routine           Modified Independent with


 


Functional Transfers for ADL   Modified Independent with


 


Grooming Routine               Independent


 


Feeding Routine                Modified Independent with


 


Light Housekeeping Tasks       Moderate Assist














 Nursing: Goals











Bladder Goal                   chronic indwelling catheter


 


Bowel Goal                     indep


 


Nutrition Goal                 indep


 


Medication Goal                indep














 Nutrition: Goals











Intervention Goals             1. pt will tolerate least-restrictive diet 

texture





 without evidence difficulty chewing/swallowing





 2. adequate po intake to support stable wt without





 further wt loss





 3. adequate fluid intake to prevent clinical s/sx





 dehydration





 4. adequate glycemic control per inpt parameters;





 no s/sx hypo- or hyperglycemia





 5. pt will avoid grapefruit/grapefruit juice on





 statin therapy











 Speech: Goals











Speech Goal 1                  Swallowing


 


Speech Evaluation Status Goal  Mild-moderate dysphagia





1                              


 


Speech Current Status Goal 1   Mild-moderate dysphagia


 


Goal 1 Comments                Long-Term Goal: Patient will tolerate least





 restrictive diet consistencies w/ no clinical s/s





 aspiration





 Short Term Goals:





 1)  STG: Pt will tolerate nectar thick liquids w/





 no clinical s/s aspiration.





 Status: Met.





 STG: Pt will tolerate thin liquids w/ no clinical





 s/s aspiration.





 Status:  Progressing as expected





 SLP provided skilled verbal and printed





 instruction in free thin water protocol, with





 specific concern about oral care with denture





 cleaning after meals, and removing thin water at





 meals. Patient verbalized accurately after





 instruction, but required minimal cueing after





 delay.





 Patient completed oral motor swallowing exercises





 given minimal to moderate cueing. Patient i'ly





 demonstrated use of precautiosn during trials of





 thin water and for nectar liquids at supper meal.


 


Speech Goal 2                  Memory


 


Speech Goal 2 Current Status   Moderate impairment (6/30/18)


 


Speech Goal 2 Comments         Long-Term Goal: Pt will use compensatory





 strategies to encode and retrieve 4/4 new items or





 compensatory stategy steps after delay of one day





 , Independently, for independence in mobility





 safety, ADLs and community access.





 Short-term Goal:  Pt will use compensatory





 strategies to encode and retrieve 3/4 new items or





 compensatory stategy steps  after delay of 10





 minutes, given Moderate skilled instruction and





 cueing.





 Status:  Progressing as expected.





 Patient required moderate cueing to recall 4





 precautions for thin water after delay.





 


 


Speech Goal 3                  Problem Solving


 


Speech Goal 3 Current Status   Moderate impairment (6/30/18)


 


Speech Goal 3 Comments         Long-Term Goal: Pt will use compensatory





 strategies to solve moderately complex routine





 problems, with 100% accuracy, Independently, for





 ADLs such as shopping, time and money management.





 Short-Term Goal: Pt will use compensatory





 strategies to solve moderately complex routine





 problems, with 80% accuracy, given Moderate





 skilled instruction and cueing.





 Status:  Progressing as expected.





 Given minimal cueing, and patient solved basic





 math problems, wrote checks, and copied text





 accurately with 95% legible handwriting.





 SLP presented sample bills on one page and blank





 checks on another, and faded from minimal





 instruction to [place] for patient to use "





 repeat out loud strategy to read, encpode and





 recall date payee and amount with 100% accuracy





 with minimal lookback.














 Social Work: Goals











Discharge Plan                 return home with home care svs and family support


 


Potential for Family Training  pt's son is involved and supportive


 


Anticipated Discharge          Home





Destination                    


 


Discharge With                 home care svs and family support

















Care Plan: 


 Care Plan





ADL's - Improve/Maintain                Start:  06/27/18 14:48              


Freq:   DAILY                           Status: Active      Target:         


Protocol:                                                                   








Activity Type Activity Date Activity User E-Sign Co-Sign Detail





Recorded Client Recorded Date Recorded By   


 


Document 07/16/18 14:43 [id number]   





[id number] 07/16/18 14:43 [id number]   














  07/16/18





  14:43


 


PMRU Outcome: ADL's/ADL Transfers 


 


Orders/Interventions Occupational





 Therapy





 Evaluation &





 Treatment





 Communication





 Tool in Patient





 Room


 


Device Yes


 


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


 


Patient to receive OT 5x/wk for  Therex





min/day Self Care





 Management





 Group Therapy





 Neuromuscular





 ReEducation


 


UE/LE ADL's with Assist Yes: Raymond


 


ADL Transfers with Assist Yes: Raymond


 


Toileting: Transfers,Clothing Management Yes: Raymond





,Hygeine w/Assist 


 


Light Kitchen/Laundry w/Assist Yes: modA


 


Progression Toward Outcome/Goals Progressing


 


Outcome/Goals Met Pt is making





 gradual





 improvements





 with STS and





 static standing





 balance as he





 was able to





 complete LB





 dressing with 1





 assist today





 at the bedrail.





 His LUE





 function also





 continues to





 gradually





 improve but pt





 continues to





 state "the arm





 can do nothing"





 . Pt having





 difficulty





 initiating to





 complete ADLs





 during session.





 He required





 constant





 encouragement





 throughout





 session from





 OTS in order to





 continue to





 attempt tasks.





 Pt continues





 to be





 discouraged





 about his level





 of ability.





 Writer will





 speak in team





 meeting





 tomorrow about





 possiblility of





 having





 assistanct at





 home.





 [ End ]








Communication-Improve/Maintain          Start:  06/29/18 10:06              


Freq:   DAILY                           Status: Active      Target:         


Protocol:                                                                   








Activity Type Activity Date Activity User E-Sign Co-Sign Detail





Recorded Client Recorded Date Recorded By   


 


Document 07/16/18 18:25 [id number]   





[id number] 07/16/18 18:25 [id number]   














  07/16/18





  18:25


 


PMRU Outcome: Communication/Cognitive 





Status 


 


Outcome/Goals Makes Needs





 Known





 Effectively


 


Other Outcomes/Goals Swallowing





 Long-Term Goal:





 Patient will





 tolerate least





 restrictive





 diet





 consistencies w





 / no clinical s





 /s aspiration





 Short Term





 Goals:





 STG 1: Pt will





 tolerate nectar





 thick liquids





 w/ no clinical





 s/s aspiration.





 Status: Met.





 STG 2: Pt will





 tolerate thin





 liquids w/ no





 clinical s/s





 aspiration.





 Status:





 Progressing as





 expected





 Memory





 Long-Term Goal:





 Pt will use





 compensatory





 strategies to





 encode and





 retrieve 4/4





 new items or





 compensatory





 stategy steps





 after delay of





 one day,





 Independently,





 for





 independence in





 mobility





 safety,





 ADLs and





 community





 access.





 Short-term Goal





 :  Pt will use





 compensatory





 strategies to





 encode and





 retrieve 3/4





 new items or





 compensatory





 stategy steps





 after delay of





 10 minutes,





 given Moderate





 skilled





 instruction and





 cueing.





 Status:





 Progressing as





 expected.





 Problem Solving





 Long-Term Goal:





 Pt will use





 compensatory





 strategies to





 solve





 moderately





 complex routine





 problems, with





 100% accuracy,





 Independently,





 for ADLs such





 as shopping,





 time and money





 management.





 Short-Term Goal





 : Pt will use





 compensatory





 strategies to





 solve





 moderately





 complex routine





 problems, with





 80% accuracy,





 given Moderate





 skilled





 instruction and





 cueing.





 Status:





 Progressing as





 expected.


 


Progression Toward Outcomes/Goals Progressing


 


Outcome/Goals Met Comment Patient is





 progressing as





 expected.





 Patient solved





 math problems,





 wrote checks,





 and copied text





 accurately





 with 95%





 legible





 handwriting.








Coping/Psych-Improve/Maintain           Start:  06/26/18 16:22              


Freq:   QSHIFT                          Status: Active      Target:         


Protocol:                                                                   








Activity Type Activity Date Activity User E-Sign Co-Sign Detail





Recorded Client Recorded Date Recorded By   


 


Document 07/17/18 00:41 [id number]   





[id number]-C03 07/17/18 00:43 [id number]   














  07/17/18





  00:41


 


PMRU Outcome: Coping/Psychosocial 


 


Coping Outcome/Goals Verbalization





 of Acceptance





 of Rehab Admit





 Willingness to





 Participate in





 Treatment Plan





 and Basic Needs





 Utilization of





 Available





 Support Systems





 Absence of





 Destructive





 Behavior to





 Self/Others


 


Psychosocial Outcome/Goals Maintain/





 Improve





 Emotional





 Health





 Demonstrates





 Knowledge of





 Healthy Coping





 Mechanisms





 Available





 Cooperate/





 Participate in





 Plan


 


Progression Toward Outcome/Goals - Progressing





Coping 


 


Progression Toward Outcome/Goals - Progressing





Psychosocial 


 


Coping Outcome/Goals Met Demonstrates





 Understanding





 of Rehab Admit





 and Goal





 Setting Process


 


Psychosocial Outcome/Goals Met Maintain/





 Improved





 Emotional





 Health





 Cooperate/





 Participated in





 Plan








DVT Prophylaxis- Improve/Maintain       Start:  06/26/18 16:22              


Freq:   QSHIFT                          Status: Active      Target:         


Protocol:                                                                   








Activity Type Activity Date Activity User E-Sign Co-Sign Detail





Recorded Client Recorded Date Recorded By   


 


Document 07/17/18 00:41 [id number]   





[id number]-C03 07/17/18 00:43 [id number]   














  07/17/18





  00:41


 


PMRU Outcome: DVT Prophylaxis 


 


Outcome/Goals Remains Free of





 DVT





 Complies with





 DVT Prophylaxis





 /Treatment





 Demonstrates





 Knowledge of





 DVT Prevention/





 Treatment





 TEDS Stockings





 on Every AM,





 Off at HS


 


Progression Toward Outcome/Goals Progressing


 


Outcome/Goals Met Remains Free of





 DVT





 Complies with





 DVT Prophylaxis





 /Treatment





 TEDS Stockings





 on Every AM,





 Off at HS








Discharge Planning - Improve/Maintain   Start:  06/26/18 16:22              


Freq:   DAILY@1200                      Status: Active      Target:         


Protocol:                                                                   








Activity Type Activity Date Activity User E-Sign Co-Sign Detail





Recorded Client Recorded Date Recorded By   


 


Document 07/17/18 00:41 [id number]   





PMRU-C03 07/17/18 00:43 [id number]   














  07/17/18





  00:41


 


PMRU Outcome: Discharge Planning 


 


Outcome/Goals Demonstrates





 Understanding





 of Discharge





 Plan


 


Progression Toward Outcome/Goals Progressing








Education-Improve/Maintain              Start:  06/26/18 16:22              


Freq:   QSHIFT                          Status: Active      Target:         


Protocol:                                                                   








Activity Type Activity Date Activity User E-Sign Co-Sign Detail





Recorded Client Recorded Date Recorded By   


 


Document 07/17/18 00:41 [id number]   





PMRU-C03 07/17/18 00:43 [id number]   














  07/17/18





  00:41


 


PMRU Outcome: Education 


 


Outcome/Goals Encourage





 Questions


 


Progression Toward Outcome/Goals Progressing








/GI-Improve/Maintain                  Start:  06/26/18 16:22              


Freq:   QSHIFT                          Status: Active      Target:         


Protocol:                                                                   








Activity Type Activity Date Activity User E-Sign Co-Sign Detail





Recorded Client Recorded Date Recorded By   


 


Document 07/17/18 00:41 [id number]   





PMRU-C03 07/17/18 00:43 [id number]   














  07/17/18





  00:41


 


PMRU Outcome: Genitourinary/ 





Gastrointestinal 


 


Genitourinary- Outcome/Goals Maintain/





 Achieve





 Adequate





 Urinary Output





 Remain Free of





 Hospital-





 Acquired UTI


 


Gastrointestinal-Outcome/Goals Maintain/





 Achieve Bowel





 Regularity in





 Accordance with





 Pt's Baseline





 Prevent





 Constipation





 Laxatives as





 Ordered


 


Progression Toward Outcome/Goals -  Progressing


 


Progression Toward Outcome/Goals - GI Progressing


 


Genitourinary- Outcome/Goals Met Maintain/





 Achieve





 Adequate





 Urinary Output


 


Gastrointestinal-Outcome/Goals Met Maintain/





 Achieve Bowel





 Regularity in





 Accordance with





 Pt's Baseline


 


Outcome/Goals Met Comment sandoval in place








Medication Administration               Start:  06/26/18 16:22              


Freq:   QSHIFT                          Status: Active      Target:         


Protocol:                                                                   








Activity Type Activity Date Activity User E-Sign Co-Sign Detail





Recorded Client Recorded Date Recorded By   


 


Document 07/17/18 00:41 [id number]   





PMRU-C03 07/17/18 00:43 [id number]   














  07/17/18





  00:41


 


PMRU Outcome: Medication Administration 


 


Assess Patient Knowledge/Teach Med Yes





Education for all Meds 


 


Outcome/Goals Patient





 Independent





 with Medication





 Administration





 at Home





 Demonstrates





 Understanding


 


Progression Towards Outcome/Goals Progressing


 


Outcome/Goals Met Demonstrates





 Understanding


 


Is Patient Going Home on Lovenox? No








Metabolic Status- Improve/Maintain      Start:  06/26/18 16:22              


Freq:   QSHIFT                          Status: Active      Target:         


Protocol:                                                                   








Activity Type Activity Date Activity User E-Sign Co-Sign Detail





Recorded Client Recorded Date Recorded By   


 


Document 07/17/18 00:41 [id number]   





PMRU-C03 07/17/18 00:43 [id number]   














  07/17/18





  00:41


 


PMRU Outcome: Metabolic Status 


 


Have Fingersticks Been Ordered Yes


 


Fingerstick Order Frequency AC & HS


 


Outcome/Goals Maintain/





 Improve





 Metabolic





 Status





 Demonstrate





 Knowledge of





 Prevention/





 Treatment of





 Metabolic





 Imbalances


 


Progression Toward Outcome/Goals Progressing


 


Outcome/Goals Met Comment no coverated at





 HS








Mobility- Improve/Maintain              Start:  06/30/18 15:55              


Freq:   DAILY                           Status: Active      Target:         


Protocol:                                                                   








Activity Type Activity Date Activity User E-Sign Co-Sign Detail





Recorded Client Recorded Date Recorded By   


 


Document 07/09/18 14:50 [id number]   





PMRU-C12 07/09/18 14:50 [id number]   














  07/09/18





  14:50


 


PMRU Outcome: Mobility 


 


Physical Therapy Evaluation and Yes





Treatment 


 


Activity OOB with Assistance Yes


 


Device Yes


 


Assistance Yes


 


Patient to be seen 5x/wk for  min/ Therex





day for: Mobility





 Training





 Gait Training





 W/C Mobility





 Balance


 


Outcome/Goals Maintain/





 Achieve





 Baseline





 Mobility Status





 Improve





 Mobility Status





 Demonstrates





 Proper Use of





 Assistive





 Devices





 Free from





 Complications





 of Immobility


 


Progression Toward Outcome/Goals Progressing


 


Outcome/Goals Met Improve





 Mobility Status


 


Outcome/Goals Met Comment improved





 ability to





 perform stand





 pivot transfer





 from recliner





 to edge of bed





 with mod A


 


Bed Mobility Yes:





 independent


 


Transfers Yes:





 independent





 with RW


 


Gait x ft Yes: CGA with





 rolling walker





 150'


 


Up/Down Stairs Yes: Min assist





 up/down 8





 stairs with 2





 rails.








Neurological- Improve/Maintain          Start:  06/26/18 16:22              


Freq:   QSHIFT                          Status: Active      Target:         


Protocol:                                                                   








Activity Type Activity Date Activity User E-Sign Co-Sign Detail





Recorded Client Recorded Date Recorded By   


 


Document 07/17/18 00:41 [id number]   





PMRU-C03 07/17/18 00:43 [id number]   














  07/17/18





  00:41


 


PMRU Outcome: Neurological 


 


Weakness/Aphasia Weakness





 Left Side


 


Outcome/Goals Maintain/





 Achieve





 Baseline





 Neurological





 Status





 Improve





 Neurological





 Status





 Prevent





 Avoidable





 Neurological





 Decline





 Maintain/





 Improve





 Strength/ROM


 


Progression Toward Outcome/Goals Progressing








Nutrition-Improve/Maintain              Start:  06/26/18 18:14              


Freq:   DAILY@0400,1600                 Status: Active      Target:         


Protocol:                                                                   








Activity Type Activity Date Activity User E-Sign Co-Sign Detail





Recorded Client Recorded Date Recorded By   


 


Document 07/17/18 00:41 [id number]   





PMRU-C03 07/17/18 00:43 [id number]   














  07/17/18





  00:41


 


Outcome: Nutrition 


 


Outcome/Goals Maintain/





 Improve





 Nutritional





 Status


 


Progression Toward Outcome/Goals Progressing








Nutrition/Swallowing- Improve/Maintain  Start:  06/29/18 10:07              


Freq:   QSHIFT                          Status: Active      Target:         


Protocol:                                                                   








Activity Type Activity Date Activity User E-Sign Co-Sign Detail





Recorded Client Recorded Date Recorded By   


 


Document 07/17/18 00:41 [id number]   





[id number]-C03 07/17/18 00:43 [id number]   














  07/17/18





  00:41


 


PMRU Outcome: Nutrition/Swallowing 


 


Outcome/Goals Maintain/





 Improve





 Nutritional





 Status


 


Progression Toward Outcome/Goals Progressing


 


Outcome/Goals Met Demonstrates





 Adequate





 Hydration/





 Prevents





 Dehydration








Safety- Improve/Maintain                Start:  06/26/18 16:22              


Freq:   QSHIFT                          Status: Active      Target:         


Protocol:                                                                   








Activity Type Activity Date Activity User E-Sign Co-Sign Detail





Recorded Client Recorded Date Recorded By   


 


Document 07/17/18 00:41 [id number]   





PMRU-C03 07/17/18 00:43 [id number]   














  07/17/18





  00:41


 


PMRU Outcome: Safety 


 


Outcome/Goals Remain Free of





 Injury or Harm





 Cooperates with





 Safety





 Measures for





 Least





 Restrictive





 Environment





 Prevent Falls/





 Injury





 Equipment





 Needed


 


Progression Toward Outcome/Goals Progressing


 


Outcome/Goals Met Remain Free of





 Injury or Harm





 Cooperates with





 Safety





 Measures for





 Least





 Restrictive





 Environment





 Prevent Falls/





 Injury


 


Outcome/Goals Met Comment pt rings





 appropriately








Skin- Improve/Maintain                  Start:  06/26/18 16:22              


Freq:   QSHIFT                          Status: Active      Target:         


Protocol:                                                                   








Activity Type Activity Date Activity User E-Sign Co-Sign Detail





Recorded Client Recorded Date Recorded By   


 


Document 07/17/18 00:41 [id number]   





PMRU-C03 07/17/18 00:43 [id number]   














  07/17/18





  00:41


 


PMRU Outcome: Skin 


 


Skin Risk Level Medium


 


Skin Orders Air Mattress





 Turn/Position





 q2hr While in





 Bed


 


Outcome/Goals Maintain/





 Improve Skin





 Intergrity





 Free from





 Decubitus


 


Progression Toward Outcome/Goals Progressing














Medicine Note: 








Length of Stay:  2 1/2 weeks





Anticipated Discharge Destination: Home





Tentative Discharge Date:  August 3, 2018





Discharged to:  Home vs SNF No